# Patient Record
Sex: MALE | Race: WHITE | NOT HISPANIC OR LATINO | Employment: FULL TIME | URBAN - METROPOLITAN AREA
[De-identification: names, ages, dates, MRNs, and addresses within clinical notes are randomized per-mention and may not be internally consistent; named-entity substitution may affect disease eponyms.]

---

## 2018-07-30 ENCOUNTER — APPOINTMENT (OUTPATIENT)
Dept: RADIOLOGY | Facility: MEDICAL CENTER | Age: 48
End: 2018-07-30
Attending: EMERGENCY MEDICINE
Payer: COMMERCIAL

## 2018-07-30 ENCOUNTER — HOSPITAL ENCOUNTER (EMERGENCY)
Facility: MEDICAL CENTER | Age: 48
End: 2018-07-30
Attending: EMERGENCY MEDICINE
Payer: COMMERCIAL

## 2018-07-30 VITALS
HEART RATE: 85 BPM | BODY MASS INDEX: 36 KG/M2 | DIASTOLIC BLOOD PRESSURE: 90 MMHG | WEIGHT: 271.61 LBS | TEMPERATURE: 98.6 F | RESPIRATION RATE: 18 BRPM | OXYGEN SATURATION: 97 % | SYSTOLIC BLOOD PRESSURE: 133 MMHG | HEIGHT: 73 IN

## 2018-07-30 DIAGNOSIS — M71.50 TRAUMATIC BURSITIS: ICD-10-CM

## 2018-07-30 DIAGNOSIS — S61.431A: ICD-10-CM

## 2018-07-30 DIAGNOSIS — S60.221A CONTUSION OF RIGHT HAND, INITIAL ENCOUNTER: ICD-10-CM

## 2018-07-30 PROCEDURE — 302875 HCHG BANDAGE ACE 4 OR 6""

## 2018-07-30 PROCEDURE — A9270 NON-COVERED ITEM OR SERVICE: HCPCS | Performed by: EMERGENCY MEDICINE

## 2018-07-30 PROCEDURE — 73130 X-RAY EXAM OF HAND: CPT | Mod: RT

## 2018-07-30 PROCEDURE — 99284 EMERGENCY DEPT VISIT MOD MDM: CPT

## 2018-07-30 PROCEDURE — 700102 HCHG RX REV CODE 250 W/ 637 OVERRIDE(OP): Performed by: EMERGENCY MEDICINE

## 2018-07-30 PROCEDURE — 73080 X-RAY EXAM OF ELBOW: CPT | Mod: RT

## 2018-07-30 RX ORDER — HYDROCODONE BITARTRATE AND ACETAMINOPHEN 5; 325 MG/1; MG/1
1 TABLET ORAL ONCE
Status: COMPLETED | OUTPATIENT
Start: 2018-07-30 | End: 2018-07-30

## 2018-07-30 RX ORDER — NAPROXEN 500 MG/1
500 TABLET ORAL 2 TIMES DAILY WITH MEALS
Qty: 60 TAB | Refills: 0 | Status: SHIPPED | OUTPATIENT
Start: 2018-07-30 | End: 2019-05-28

## 2018-07-30 RX ORDER — HYDROCODONE BITARTRATE AND ACETAMINOPHEN 5; 325 MG/1; MG/1
1 TABLET ORAL EVERY 6 HOURS PRN
Qty: 14 TAB | Refills: 0 | Status: SHIPPED | OUTPATIENT
Start: 2018-07-30 | End: 2018-08-03

## 2018-07-30 RX ADMIN — HYDROCODONE BITARTRATE AND ACETAMINOPHEN 1 TABLET: 5; 325 TABLET ORAL at 22:26

## 2018-07-30 ASSESSMENT — PAIN SCALES - GENERAL: PAINLEVEL_OUTOF10: 8
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
## 2018-07-31 NOTE — DISCHARGE INSTRUCTIONS
Hand Injuries  Minor breaks (fractures), sprains, bruises (contusions), and burns of the hand are all examples of hand injuries. A fracture is a break in the bone. A sprain means that ligaments have been stretched or torn. A contusion is the result of an injury that caused bleeding under the skin. Burns are damage to the skin that occurs when the hand comes in contact with something very hot (or with certain chemicals).   HOME CARE INSTRUCTIONS  · For sprains, keep your hand raised (elevated) above the level of your heart. Do this until the pain and swelling improve.   · Use hand bandages (dressings) and splints to reduce motion, relieve pain, and prevent reinjury as directed. The dressing and splint should not be removed without your caregiver's approval.   · Put ice on the injured area to reduce pain and swelling due to fractures, sprains, and deep bruises.   · Put ice in a plastic bag.   · Place a towel between your skin and the bag.   · Leave the ice on for 15-20 minutes, 3-4 times a day. Do this for 2 3 days.   · Only take over-the-counter or prescription medicines as directed by your caregiver.   · Follow up with your caregiver or a specialist as directed.   Early motion exercises are sometimes needed to reduce joint stiffness after a hand injury. However, your hand should not be used for any activities that increase pain.  SEEK MEDICAL CARE IF:  · Your pain or swelling does not improve in 2 3 days.   SEEK IMMEDIATE MEDICAL CARE IF:  · You have increased pain, swelling, or redness in your hand.   · Your pain is not controlled with medicine.   · You have a fever or persistent symptoms for more than 2 3 days.   · You have a fever and your symptoms suddenly get worse.   · You have pain when moving your fingers.   · You have pus coming from the wound.   · You have numbness in your fingers.   MAKE SURE YOU:  · Understand these instructions.   · Will watch your condition.   · Will get help right away if you are not  doing well or get worse.   Document Released: 01/25/2006 Document Revised: 09/11/2013 Document Reviewed: 06/30/2013  "[employer]® Patient Information ©2013 "[employer].

## 2018-11-20 ENCOUNTER — OFFICE VISIT (OUTPATIENT)
Dept: MEDICAL GROUP | Facility: PHYSICIAN GROUP | Age: 48
End: 2018-11-20
Payer: COMMERCIAL

## 2018-11-20 VITALS
WEIGHT: 273 LBS | RESPIRATION RATE: 16 BRPM | BODY MASS INDEX: 36.18 KG/M2 | TEMPERATURE: 97.3 F | HEIGHT: 73 IN | SYSTOLIC BLOOD PRESSURE: 138 MMHG | DIASTOLIC BLOOD PRESSURE: 86 MMHG | OXYGEN SATURATION: 98 %

## 2018-11-20 DIAGNOSIS — E78.5 DYSLIPIDEMIA: ICD-10-CM

## 2018-11-20 DIAGNOSIS — Z82.49 FAMILY HISTORY OF HEART DISEASE: ICD-10-CM

## 2018-11-20 DIAGNOSIS — E66.9 OBESITY (BMI 30-39.9): ICD-10-CM

## 2018-11-20 DIAGNOSIS — Z28.21 INFLUENZA VACCINATION DECLINED: ICD-10-CM

## 2018-11-20 DIAGNOSIS — E34.9 HYPOTESTOSTERONISM: ICD-10-CM

## 2018-11-20 DIAGNOSIS — R53.83 FATIGUE, UNSPECIFIED TYPE: ICD-10-CM

## 2018-11-20 DIAGNOSIS — Z83.3 FAMILY HISTORY OF DIABETES MELLITUS: ICD-10-CM

## 2018-11-20 PROCEDURE — 99214 OFFICE O/P EST MOD 30 MIN: CPT | Performed by: FAMILY MEDICINE

## 2018-11-21 PROBLEM — Z28.21 INFLUENZA VACCINATION DECLINED: Status: ACTIVE | Noted: 2018-11-21

## 2018-11-21 NOTE — PROGRESS NOTES
"Subjective:   Miky Benavidez is a 48 y.o. male here today for evaluation and management of:     Family history of heart disease  Father diet of an MI, grandfather also passed early from an MI and has multiple relatives who  from heart attacks.   He would like to get testing done.   Will check lipids. And have pt back for risk calculation.   Plant based diet advised.   He does not smoke.     Hypotestosteronism  Patient has low testosterone and notes that a number of his friends who grew up with him in [place] where there was a nuclear problem in the 40s and 50s have low testosterone, thyroid issues and balding. Will check some screening labs.     Influenza vaccination declined  Patient states his immune system is strong from eating a lot of dirt in childhood, he declines the influenza vaccination.   Education provided.          Current medicines (including changes today)  Current Outpatient Prescriptions   Medication Sig Dispense Refill   • naproxen (NAPROSYN) 500 MG Tab Take 1 Tab by mouth 2 times a day, with meals. (Patient not taking: Reported on 2018) 60 Tab 0     No current facility-administered medications for this visit.      He  has a past medical history of Hepatic steatosis.    ROS  No chest pain, no shortness of breath, no abdominal pain       Objective:     Blood pressure 138/86, temperature 36.3 °C (97.3 °F), temperature source Temporal, resp. rate 16, height 1.854 m (6' 1\"), weight 123.8 kg (273 lb), SpO2 98 %. Body mass index is 36.02 kg/m².   Physical Exam:  Constitutional: Alert, no distress.  Skin: Warm, dry, good turgor, no rashes in visible areas.  Eye: Equal, round and reactive, conjunctiva clear, lids normal.  ENMT: Lips without lesions, good dentition, oropharynx clear.  Neck: Trachea midline, no masses, no thyromegaly. No cervical or supraclavicular lymphadenopathy  Respiratory: Unlabored respiratory effort, lungs clear to auscultation, no wheezes, no ronchi.  Cardiovascular: " Normal S1, S2, no murmur, no edema.  Abdomen: Soft, non-tender, no masses, no hepatosplenomegaly.  Psych: Alert and oriented x3, normal affect and mood.        Assessment and Plan:   The following treatment plan was discussed    1. Family history of heart disease  - Cardiac Stress Test Treadmill Only; Future    2. Hypotestosteronism  - TESTOSTERONE SERUM; Future    3. Obesity (BMI 30-39.9)  - Patient identified as having weight management issue.  Appropriate orders and counseling given.  - COMP METABOLIC PANEL; Future  - HEMOGLOBIN A1C; Future    4. Dyslipidemia  - Lipid Profile; Future    5. Fatigue, unspecified type  - COMP METABOLIC PANEL; Future  - TSH WITH REFLEX TO FT4; Future  - TESTOSTERONE SERUM; Future    6. Family history of diabetes mellitus  - HEMOGLOBIN A1C; Future      Followup: No Follow-up on file.

## 2018-11-21 NOTE — ASSESSMENT & PLAN NOTE
Patient has low testosterone and notes that a number of his friends who grew up with him in [place] where there was a nuclear problem in the 40s and 50s have low testosterone, thyroid issues and balding. Will check some screening labs.

## 2018-11-21 NOTE — ASSESSMENT & PLAN NOTE
Patient states his immune system is strong from eating a lot of dirt in childhood, he declines the influenza vaccination.   Education provided.

## 2018-11-21 NOTE — ASSESSMENT & PLAN NOTE
Father diet of an MI, grandfather also passed early from an MI and has multiple relatives who  from heart attacks.   He would like to get testing done.   Will check lipids. And have pt back for risk calculation.   Plant based diet advised.   He does not smoke.

## 2019-01-29 ENCOUNTER — NON-PROVIDER VISIT (OUTPATIENT)
Dept: CARDIOLOGY | Facility: MEDICAL CENTER | Age: 49
End: 2019-01-29
Payer: COMMERCIAL

## 2019-01-29 DIAGNOSIS — Z82.49 FAMILY HISTORY OF HEART DISEASE: ICD-10-CM

## 2019-01-29 LAB — TREADMILL STRESS: NORMAL

## 2019-01-29 PROCEDURE — 93015 CV STRESS TEST SUPVJ I&R: CPT | Performed by: INTERNAL MEDICINE

## 2019-01-31 VITALS
OXYGEN SATURATION: 96 % | BODY MASS INDEX: 36.18 KG/M2 | WEIGHT: 273 LBS | HEIGHT: 73 IN | HEART RATE: 82 BPM | DIASTOLIC BLOOD PRESSURE: 96 MMHG | SYSTOLIC BLOOD PRESSURE: 133 MMHG

## 2019-03-27 ENCOUNTER — HOSPITAL ENCOUNTER (OUTPATIENT)
Dept: LAB | Facility: MEDICAL CENTER | Age: 49
End: 2019-03-27
Attending: FAMILY MEDICINE
Payer: COMMERCIAL

## 2019-03-27 DIAGNOSIS — E34.9 HYPOTESTOSTERONISM: ICD-10-CM

## 2019-03-27 DIAGNOSIS — Z83.3 FAMILY HISTORY OF DIABETES MELLITUS: ICD-10-CM

## 2019-03-27 DIAGNOSIS — E78.5 DYSLIPIDEMIA: ICD-10-CM

## 2019-03-27 DIAGNOSIS — E66.9 OBESITY (BMI 30-39.9): ICD-10-CM

## 2019-03-27 DIAGNOSIS — R53.83 FATIGUE, UNSPECIFIED TYPE: ICD-10-CM

## 2019-03-27 LAB
ALBUMIN SERPL BCP-MCNC: 4.2 G/DL (ref 3.2–4.9)
ALBUMIN/GLOB SERPL: 1.6 G/DL
ALP SERPL-CCNC: 86 U/L (ref 30–99)
ALT SERPL-CCNC: 18 U/L (ref 2–50)
ANION GAP SERPL CALC-SCNC: 7 MMOL/L (ref 0–11.9)
AST SERPL-CCNC: 16 U/L (ref 12–45)
BILIRUB SERPL-MCNC: 0.6 MG/DL (ref 0.1–1.5)
BUN SERPL-MCNC: 15 MG/DL (ref 8–22)
CALCIUM SERPL-MCNC: 8.9 MG/DL (ref 8.5–10.5)
CHLORIDE SERPL-SCNC: 106 MMOL/L (ref 96–112)
CHOLEST SERPL-MCNC: 196 MG/DL (ref 100–199)
CO2 SERPL-SCNC: 27 MMOL/L (ref 20–33)
CREAT SERPL-MCNC: 1.09 MG/DL (ref 0.5–1.4)
FASTING STATUS PATIENT QL REPORTED: NORMAL
GLOBULIN SER CALC-MCNC: 2.6 G/DL (ref 1.9–3.5)
GLUCOSE SERPL-MCNC: 87 MG/DL (ref 65–99)
HDLC SERPL-MCNC: 40 MG/DL
LDLC SERPL CALC-MCNC: 133 MG/DL
POTASSIUM SERPL-SCNC: 4.2 MMOL/L (ref 3.6–5.5)
PROT SERPL-MCNC: 6.8 G/DL (ref 6–8.2)
SODIUM SERPL-SCNC: 140 MMOL/L (ref 135–145)
TESTOST SERPL-MCNC: 123 NG/DL (ref 175–781)
TRIGL SERPL-MCNC: 116 MG/DL (ref 0–149)
TSH SERPL DL<=0.005 MIU/L-ACNC: 1.41 UIU/ML (ref 0.38–5.33)

## 2019-03-27 PROCEDURE — 83036 HEMOGLOBIN GLYCOSYLATED A1C: CPT

## 2019-03-27 PROCEDURE — 84403 ASSAY OF TOTAL TESTOSTERONE: CPT

## 2019-03-27 PROCEDURE — 80053 COMPREHEN METABOLIC PANEL: CPT

## 2019-03-27 PROCEDURE — 36415 COLL VENOUS BLD VENIPUNCTURE: CPT

## 2019-03-27 PROCEDURE — 84443 ASSAY THYROID STIM HORMONE: CPT

## 2019-03-27 PROCEDURE — 80061 LIPID PANEL: CPT

## 2019-03-29 LAB
EST. AVERAGE GLUCOSE BLD GHB EST-MCNC: 117 MG/DL
HBA1C MFR BLD: 5.7 % (ref 0–5.6)

## 2019-05-28 ENCOUNTER — OFFICE VISIT (OUTPATIENT)
Dept: MEDICAL GROUP | Facility: PHYSICIAN GROUP | Age: 49
End: 2019-05-28
Payer: COMMERCIAL

## 2019-05-28 VITALS
DIASTOLIC BLOOD PRESSURE: 86 MMHG | WEIGHT: 279 LBS | HEIGHT: 73 IN | TEMPERATURE: 98.4 F | OXYGEN SATURATION: 97 % | RESPIRATION RATE: 16 BRPM | BODY MASS INDEX: 36.98 KG/M2 | HEART RATE: 79 BPM | SYSTOLIC BLOOD PRESSURE: 116 MMHG

## 2019-05-28 DIAGNOSIS — R73.09 ELEVATED HEMOGLOBIN A1C: ICD-10-CM

## 2019-05-28 DIAGNOSIS — G83.4 CAUDA EQUINA COMPRESSION (HCC): ICD-10-CM

## 2019-05-28 DIAGNOSIS — E66.9 OBESITY (BMI 30-39.9): ICD-10-CM

## 2019-05-28 DIAGNOSIS — E34.9 HYPOTESTOSTERONISM: ICD-10-CM

## 2019-05-28 DIAGNOSIS — Z23 NEED FOR VACCINATION: ICD-10-CM

## 2019-05-28 DIAGNOSIS — N28.9 RENAL INSUFFICIENCY: ICD-10-CM

## 2019-05-28 DIAGNOSIS — E78.2 MIXED HYPERLIPIDEMIA: ICD-10-CM

## 2019-05-28 PROCEDURE — 90715 TDAP VACCINE 7 YRS/> IM: CPT | Performed by: FAMILY MEDICINE

## 2019-05-28 PROCEDURE — 90471 IMMUNIZATION ADMIN: CPT | Performed by: FAMILY MEDICINE

## 2019-05-28 PROCEDURE — 99214 OFFICE O/P EST MOD 30 MIN: CPT | Mod: 25 | Performed by: FAMILY MEDICINE

## 2019-05-28 ASSESSMENT — PATIENT HEALTH QUESTIONNAIRE - PHQ9
5. POOR APPETITE OR OVEREATING: 3 - NEARLY EVERY DAY
SUM OF ALL RESPONSES TO PHQ QUESTIONS 1-9: 8
CLINICAL INTERPRETATION OF PHQ2 SCORE: 2

## 2019-05-28 NOTE — ASSESSMENT & PLAN NOTE
Patient has slight increase in weight from last visit current weight is 279 pounds patient is advised on gradual weight loss with diet changes and regular activity.  Advised to avoid sweetened drinks like sodas and juices.

## 2019-05-28 NOTE — PROGRESS NOTES
"Subjective:   Miky Byrd is a 48 y.o. male here today for evaluation and management of:     Hypotestosteronism  Testosterone level low to 123 referral to endocrinology done.  Patient does report problems with erectile dysfunction as well with muscle fatigue.    Obesity (BMI 30-39.9)  Patient has slight increase in weight from last visit current weight is 279 pounds patient is advised on gradual weight loss with diet changes and regular activity.  Advised to avoid sweetened drinks like sodas and juices.    Renal insufficiency  Recent labs show normal GFR normal creatinine.  Will resolve this problem today.  Continue to monitor CMP on an annual basis.    Mixed hyperlipidemia  Results for MIKY BYRD (MRN [id number]) as of 5/28/2019 16:41   Ref. Range 3/27/2019 08:24   Cholesterol,Tot Latest Ref Range: 100 - 199 mg/dL 196   Triglycerides Latest Ref Range: 0 - 149 mg/dL 116   HDL Latest Ref Range: >=40 mg/dL 40   LDL Latest Ref Range: <100 mg/dL 133 (H)   Labs show mild elevation of LDL to 133 HDL is protective at 40 patient is advised to make lifestyle modification first we can recheck next year and if LDL is trending up can calculate his ASCVD risk score before starting statin.    Cauda equina compression  No current symptoms or neurological deficits.       Patient had a normal treadmill stress test done recently.  Current medicines (including changes today)  No current outpatient prescriptions on file.     No current facility-administered medications for this visit.      He  has a past medical history of Hepatic steatosis.    ROS  No chest pain, no shortness of breath, no abdominal pain       Objective:     /86 (BP Location: Left arm, Patient Position: Sitting, BP Cuff Size: Large adult)   Pulse 79   Temp 36.9 °C (98.4 °F) (Temporal)   Resp 16   Ht 1.854 m (6' 1\")   Wt (!) 126.6 kg (279 lb)   SpO2 97%  Body mass index is 36.81 kg/m².   Physical Exam:  Constitutional: Alert, no distress.  Skin: Warm, dry, " good turgor, no rashes in visible areas.  Eye: Equal, round and reactive, conjunctiva clear, lids normal.  ENMT: Lips without lesions, good dentition, oropharynx clear.  Neck: Trachea midline, no masses, no thyromegaly. No cervical or supraclavicular lymphadenopathy  Respiratory: Unlabored respiratory effort, lungs clear to auscultation, no wheezes, no ronchi.  Cardiovascular: Normal S1, S2, no murmur, no edema.  Abdomen: Soft, non-tender, no masses, no hepatosplenomegaly.  Psych: Alert and oriented x3, normal affect and mood.        Assessment and Plan:   The following treatment plan was discussed    1. Need for vaccination  - TDAP VACCINE =>6YO IM    2. Hypotestosteronism  - TESTOSTERONE SERUM; Future  - REFERRAL TO ENDOCRINOLOGY    3. Obesity (BMI 30-39.9)  Advised on gradual weight loss.  Patient says that he can discontinue soft drinks or sodas.    4. Renal insufficiency  Normal on recent labs continue adequate hydration and continue to monitor  - Comp Metabolic Panel; Future    5. Mixed hyperlipidemia  Advised on diet and lifestyle changes.  - Lipid Profile; Future    6. Elevated hemoglobin A1c  Advised on diet and lifestyle changes.  - HEMOGLOBIN A1C; Future    7. Cauda equina compression (HCC)  No acute changes.  Stable.      Followup: Return in about 1 year (around 5/28/2020) for test level, A1c, lipids.

## 2019-05-28 NOTE — ASSESSMENT & PLAN NOTE
Results for YESSENIA BYRD (MRN [id number]) as of 5/28/2019 16:41   Ref. Range 3/27/2019 08:24   Cholesterol,Tot Latest Ref Range: 100 - 199 mg/dL 196   Triglycerides Latest Ref Range: 0 - 149 mg/dL 116   HDL Latest Ref Range: >=40 mg/dL 40   LDL Latest Ref Range: <100 mg/dL 133 (H)   Labs show mild elevation of LDL to 133 HDL is protective at 40 patient is advised to make lifestyle modification first we can recheck next year and if LDL is trending up can calculate his ASCVD risk score before starting statin.

## 2019-05-28 NOTE — ASSESSMENT & PLAN NOTE
Recent labs show normal GFR normal creatinine.  Will resolve this problem today.  Continue to monitor CMP on an annual basis.

## 2019-05-28 NOTE — ASSESSMENT & PLAN NOTE
Testosterone level low to 123 referral to endocrinology done.  Patient does report problems with erectile dysfunction as well with muscle fatigue.

## 2020-02-08 ENCOUNTER — HOSPITAL ENCOUNTER (OUTPATIENT)
Facility: MEDICAL CENTER | Age: 50
End: 2020-02-08
Attending: NURSE PRACTITIONER
Payer: COMMERCIAL

## 2020-02-08 ENCOUNTER — OFFICE VISIT (OUTPATIENT)
Dept: URGENT CARE | Facility: PHYSICIAN GROUP | Age: 50
End: 2020-02-08
Payer: COMMERCIAL

## 2020-02-08 VITALS
BODY MASS INDEX: 38.17 KG/M2 | SYSTOLIC BLOOD PRESSURE: 132 MMHG | DIASTOLIC BLOOD PRESSURE: 84 MMHG | WEIGHT: 288 LBS | HEIGHT: 73 IN | HEART RATE: 77 BPM | RESPIRATION RATE: 16 BRPM | TEMPERATURE: 97.7 F | OXYGEN SATURATION: 98 %

## 2020-02-08 DIAGNOSIS — J02.9 SORE THROAT: ICD-10-CM

## 2020-02-08 DIAGNOSIS — J03.90 EXUDATIVE TONSILLITIS: ICD-10-CM

## 2020-02-08 LAB
INT CON NEG: NORMAL
INT CON POS: NORMAL
S PYO AG THROAT QL: NEGATIVE

## 2020-02-08 PROCEDURE — 87880 STREP A ASSAY W/OPTIC: CPT | Performed by: NURSE PRACTITIONER

## 2020-02-08 PROCEDURE — 87070 CULTURE OTHR SPECIMN AEROBIC: CPT

## 2020-02-08 PROCEDURE — 99204 OFFICE O/P NEW MOD 45 MIN: CPT | Performed by: NURSE PRACTITIONER

## 2020-02-08 RX ORDER — AMOXICILLIN 500 MG/1
500 CAPSULE ORAL 2 TIMES DAILY
Qty: 20 CAP | Refills: 0 | Status: SHIPPED | OUTPATIENT
Start: 2020-02-08 | End: 2020-02-18

## 2020-02-08 RX ORDER — LIDOCAINE HYDROCHLORIDE 20 MG/ML
5 SOLUTION OROPHARYNGEAL 4 TIMES DAILY PRN
Qty: 120 ML | Refills: 0 | Status: SHIPPED | OUTPATIENT
Start: 2020-02-08 | End: 2020-04-07

## 2020-02-08 NOTE — PROGRESS NOTES
Chief Complaint   Patient presents with   • Otalgia     sore throat x1 week        HISTORY OF PRESENT ILLNESS: Patient is a 49 y.o. male who presents today due to complaints of a sore throat for the past week, worsening. Reports associated ear pain, pain with swallowing. Denies associated congestion, cough, or difficulty breathing. He has tried OTC medications at home without much improvement. Denies known ill contacts.     Patient Active Problem List    Diagnosis Date Noted   • Cauda equina compression (HCC) 08/04/2013     Priority: High   • Vitamin d deficiency 08/05/2013     Priority: Medium   • Mixed hyperlipidemia 05/28/2019   • Influenza vaccination declined 11/21/2018   • Obesity (BMI 30-39.9) 11/20/2018   • Calculus of bile duct without mention of cholecystitis or obstruction 11/17/2016   • History of biliary stent insertion 10/24/2016   • Common wart 07/17/2015   • Hypotestosteronism 07/14/2015   • Family history of heart disease 07/09/2015   • Erectile dysfunction 07/08/2015   • SOB (shortness of breath) 07/08/2015   • Intervertebral disc rupture 08/04/2013       Allergies:Patient has no known allergies.    Current Outpatient Medications Ordered in Epic   Medication Sig Dispense Refill   • amoxicillin (AMOXIL) 500 MG Cap Take 1 Cap by mouth 2 times a day for 10 days. 20 Cap 0   • lidocaine (XYLOCAINE) 2 % Solution Take 5 mL by mouth 4 times a day as needed for Throat/Mouth Pain. Mix 5mL in 1/4 cup of water, swish medication and spit. 120 mL 0     No current Epic-ordered facility-administered medications on file.        Past Medical History:   Diagnosis Date   • Hepatic steatosis        Social History     Tobacco Use   • Smoking status: Never Smoker   • Smokeless tobacco: Never Used   Substance Use Topics   • Alcohol use: Yes     Comment: occ   • Drug use: No       Family Status   Relation Name Status   • Mo  Alive   • Fa  Alive     Family History   Problem Relation Age of Onset   • Diabetes Mother    •  "Diabetes Father    • Heart Disease Father        ROS:  Review of Systems   Constitutional: Negative for fever, chills. Negative for weight loss, malaise, and fatigue.   HENT: Positive for sore throat, ear pain. Negative for ear pain, nosebleeds, congestion.   Eyes: Negative for vision changes.   Cardiovascular: Negative for chest pain, palpitations, orthopnea and leg swelling.   Respiratory: Negative for cough, sputum production, shortness of breath and wheezing.   Gastrointestinal: Negative for abdominal pain, nausea, vomiting or diarrhea.   : Negative for changes in urination.   Skin: Negative for rash, diaphoresis.     Exam:  /84   Pulse 77   Temp 36.5 °C (97.7 °F)   Resp 16   Ht 1.854 m (6' 1\")   Wt (!) 130.6 kg (288 lb)   SpO2 98%   General: well-nourished, well-developed male in NAD  Head: normocephalic, atraumatic  Eyes: PERRLA, no conjunctival injection, acuity grossly intact, lids normal.  Ears: normal shape and symmetry, no tenderness, no discharge. External canals are without any significant edema or erythema. Tympanic membranes are without any inflammation, no effusion. Gross auditory acuity is intact.  Nose: symmetrical without tenderness, no discharge.  Mouth/Throat: reasonable hygiene. There is erythema, right sided exudates and tonsillar enlargement present.  Neck: no masses, range of motion within normal limits, no tracheal deviation. No obvious thyroid enlargement.   Lymph: bilateral anterior cervical adenopathy. No supraclavicular adenopathy.   Neuro: alert and oriented. Cranial nerves 1-12 grossly intact. No sensory deficit.   Cardiovascular: regular rate and rhythm. No edema.  Pulmonary: no distress. Chest is symmetrical with respiration, no wheezes, crackles, or rhonchi.   Musculoskeletal: no clubbing, appropriate muscle tone, gait is stable.  Skin: warm, dry, intact, no clubbing, no cyanosis, no rashes.   Psych: appropriate mood, affect, judgement.         Assessment/Plan:  1. " Exudative tonsillitis  amoxicillin (AMOXIL) 500 MG Cap    CULTURE THROAT   2. Sore throat  POCT Rapid Strep A    amoxicillin (AMOXIL) 500 MG Cap    lidocaine (XYLOCAINE) 2 % Solution    CULTURE THROAT         POC strep negative      Suspect bacterial etiology, culture obtained.  Ear pain most likely referred.  OTC Motrin and Tylenol for pain relief.  Amoxicillin as directed.  Lidocaine as needed.  Antibiotic as directed. Hand hygiene. Increase fluid intake, rest. Warm salt water gargles.   Supportive care, differential diagnoses, and indications for immediate follow-up discussed with patient.   Pathogenesis of diagnosis discussed including typical length and natural progression.   Instructed to return to clinic or nearest emergency department for any change in condition, further concerns, or worsening of symptoms.  Patient states understanding of the plan of care and discharge instructions.  Instructed to make an appointment, for follow up, with his primary care provider.        Please note that this dictation was created using voice recognition software. I have made every reasonable attempt to correct obvious errors, but I expect that there are errors of grammar and possibly content that I did not discover before finalizing the note.      CANDELARIO Niño.

## 2020-02-10 LAB
BACTERIA SPEC RESP CULT: NORMAL
SIGNIFICANT IND 70042: NORMAL
SITE SITE: NORMAL
SOURCE SOURCE: NORMAL

## 2020-04-06 NOTE — PROGRESS NOTES
New Patient Consult Note  Primary care physician: Rosa Riddle M.D.    Reason for consult: Hypogonadism    HPI:  This encounter was conducted via Zoom.  Verbal consent was obtained. Patient's identity was verified.  Miky Benavidez is a 49 y.o. old patient who comes in today for evaluation of Hypogonadism, Obesity, and Vitamin D Deficiency.  He states he is more tired, having more muscle weakness, and erectile dysfunction.  He is a  and trying to eat healthier and walking more.  He is not taking Vitamin D supplementation except a multivitamin.      ROS:  Constitutional: fatigue,No weight loss  Muscular: muscle weakness, loss of endurance  : low libido, ED  Cardiac: No palpitations or racing heart  Resp: No shortness of breath  Neuro: No numbness or tinging in feet  Endo: No heat or cold intolerance, no polyuria or polydipsia  All other systems were reviewed and were negative.    Past Medical History:  Patient Active Problem List    Diagnosis Date Noted   • Cauda equina compression (HCC) 08/04/2013     Priority: High   • Vitamin d deficiency 08/05/2013     Priority: Medium   • Mixed hyperlipidemia 05/28/2019   • Influenza vaccination declined 11/21/2018   • Obesity (BMI 30-39.9) 11/20/2018   • Calculus of bile duct without mention of cholecystitis or obstruction 11/17/2016   • History of biliary stent insertion 10/24/2016   • Common wart 07/17/2015   • Hypotestosteronism 07/14/2015   • Family history of heart disease 07/09/2015   • Erectile dysfunction 07/08/2015   • SOB (shortness of breath) 07/08/2015   • Intervertebral disc rupture 08/04/2013       Past Surgical History:  Past Surgical History:   Procedure Laterality Date   • ERCP W/ INSERTION STENT/TUBE N/A 11/17/2016    Procedure: ERCP W/ INSERTION STENT/TUBE - FOR STENT PLACEMENT/REMOVAL;  Surgeon: Andriy Somers M.D.;  Location: SURGERY [facility];  Service:    • ERCP W/REMOVAL CALCULUS N/A 11/17/2016    Procedure: ERCP W/REMOVAL  CALCULUS;  Surgeon: Andriy Somers M.D.;  Location: SURGERY [facility];  Service:    • ERCP IN OR  10/7/2016    Procedure: Endoscopic retrogade cholangiopancreatography;  Surgeon: Andriy Somers M.D.;  Location: SURGERY [facility];  Service:    • SANDI BY LAPAROSCOPY  10/6/2016    Procedure: SANDI BY LAPAROSCOPY - WITH INTRAOPERATIVE CHOLANGIOGRAMS;  Surgeon: Keaton Parisi M.D.;  Location: SURGERY [facility];  Service:    • LUMBAR LAMINECTOMY DISKECTOMY  8/5/2013    Performed by Escobar Capps M.D. at SURGERY [facility]   • TONSILLECTOMY         Allergies:  Patient has no known allergies.    Social History:  Social History     Socioeconomic History   • Marital status:      Spouse name: Not on file   • Number of children: Not on file   • Years of education: Not on file   • Highest education level: Not on file   Occupational History   • Not on file   Social Needs   • Financial resource strain: Not on file   • Food insecurity     Worry: Not on file     Inability: Not on file   • Transportation needs     Medical: Not on file     Non-medical: Not on file   Tobacco Use   • Smoking status: Never Smoker   • Smokeless tobacco: Never Used   Substance and Sexual Activity   • Alcohol use: Yes     Frequency: Monthly or less     Comment: occ   • Drug use: No   • Sexual activity: Yes     Partners: Female   Lifestyle   • Physical activity     Days per week: Not on file     Minutes per session: Not on file   • Stress: Not on file   Relationships   • Social connections     Talks on phone: Not on file     Gets together: Not on file     Attends [employer]ist service: Not on file     Active member of club or organization: Not on file     Attends meetings of clubs or organizations: Not on file     Relationship status: Not on file   • Intimate partner violence     Fear of current or ex partner: Not on file     Emotionally abused: Not on file     Physically abused: Not on file     Forced sexual activity: Not  on file   Other Topics Concern   • Not on file   Social History Narrative   • Not on file       Family History:  Family History   Problem Relation Age of Onset   • Diabetes Mother    • Diabetes Father    • Heart Disease Father        Medications:    Current Outpatient Medications:   •  Multiple Vitamins-Minerals (MENS MULTIVITAMIN PO), Take  by mouth., Disp: , Rfl:   •  vitamin D, Ergocalciferol, (DRISDOL) 1.25 MG (04297 UT) Cap capsule, Take 1 Cap by mouth every 7 days. With food., Disp: 12 Cap, Rfl: 3    Current Facility-Administered Medications:   •  testosterone cypionate (DEPO-TESTOSTERONE) injection 250 mg, 250 mg, Intramuscular, Q14 DAYS, Sedrick Young M.D.    Labs: Reviewed    Physical Examination:  Vital signs: There were no vitals taken for this visit. There is no height or weight on file to calculate BMI. Patient's body mass index is unknown because there is no height or weight on file.   General: No apparent distress, cooperative  Eyes: No scleral icterus or discharge  ENMT: Normal on external inspection of nose, lips, normal thyroid on inspection  Neck: No abnormal masses on inspection  Resp: Normal effort  Extremities: No visible edema  Abdomen:  visible abdominal obesity  Neuro: Alert and oriented  Skin: No visible rash  Psych: Normal mood and affect, intact memory and able to make informed decisions    Assessment and Plan:    1. Hypogonadism in male  Start testosterone cypionate 250 mg IM every 2 weeks; side effects and  Benefits discussed in detail.     2. Vitamin D deficiency  Recommend vit D 50,000 units once a week with food. Script sent to pharmacy.   Side effects and benefits discussed with patient in detail.    Return in about 3 months (around 7/7/2020).    Thank you for allowing me to participate in the care of this patient.        CC:   Rosa Riddle M.D.    This note was created using voice recognition software (Dragon). The accuracy of the dictation is limited by the abilities of  the software. I have reviewed the note prior to signing, however some errors in grammar and context are still possible. If you have any questions related to this note please do not hesitate to contact our office.

## 2020-04-07 ENCOUNTER — OFFICE VISIT (OUTPATIENT)
Dept: ENDOCRINOLOGY | Facility: MEDICAL CENTER | Age: 50
End: 2020-04-07
Payer: COMMERCIAL

## 2020-04-07 DIAGNOSIS — E66.9 OBESITY (BMI 35.0-39.9 WITHOUT COMORBIDITY): ICD-10-CM

## 2020-04-07 DIAGNOSIS — E03.9 HYPOTHYROIDISM, UNSPECIFIED TYPE: ICD-10-CM

## 2020-04-07 DIAGNOSIS — E55.9 VITAMIN D DEFICIENCY: ICD-10-CM

## 2020-04-07 DIAGNOSIS — E29.1 HYPOGONADISM IN MALE: ICD-10-CM

## 2020-04-07 DIAGNOSIS — R79.89 PATHOLOGIC HIGH SERUM PROLACTIN: ICD-10-CM

## 2020-04-07 DIAGNOSIS — E53.8 VITAMIN B 12 DEFICIENCY: ICD-10-CM

## 2020-04-07 PROCEDURE — 99204 OFFICE O/P NEW MOD 45 MIN: CPT | Mod: 95,CR | Performed by: INTERNAL MEDICINE

## 2020-04-07 RX ORDER — ERGOCALCIFEROL 1.25 MG/1
50000 CAPSULE ORAL
Qty: 12 CAP | Refills: 3 | Status: SHIPPED | OUTPATIENT
Start: 2020-04-07 | End: 2020-11-06

## 2020-04-07 RX ORDER — TESTOSTERONE CYPIONATE 200 MG/ML
250 INJECTION, SOLUTION INTRAMUSCULAR
Status: DISCONTINUED | OUTPATIENT
Start: 2020-04-07 | End: 2021-04-12

## 2020-04-07 SDOH — HEALTH STABILITY: MENTAL HEALTH: HOW OFTEN DO YOU HAVE A DRINK CONTAINING ALCOHOL?: MONTHLY OR LESS

## 2020-05-01 ENCOUNTER — NON-PROVIDER VISIT (OUTPATIENT)
Dept: ENDOCRINOLOGY | Facility: MEDICAL CENTER | Age: 50
End: 2020-05-01
Payer: COMMERCIAL

## 2020-05-01 PROCEDURE — 96372 THER/PROPH/DIAG INJ SC/IM: CPT | Performed by: INTERNAL MEDICINE

## 2020-05-01 RX ADMIN — TESTOSTERONE CYPIONATE 250 MG: 200 INJECTION, SOLUTION INTRAMUSCULAR at 09:56

## 2020-05-01 NOTE — PROGRESS NOTES
Miky's wife came in today to learn how to give the Testosterone injection.  She was able to draw up the testosterone and give it without difficulty.

## 2020-05-06 DIAGNOSIS — E29.1 HYPOGONADISM IN MALE: ICD-10-CM

## 2020-05-06 RX ORDER — TESTOSTERONE CYPIONATE 200 MG/ML
250 INJECTION, SOLUTION INTRAMUSCULAR
Qty: 10 ML | Refills: 0 | Status: SHIPPED | OUTPATIENT
Start: 2020-05-06 | End: 2020-09-14

## 2020-05-06 RX ORDER — SYRINGE W-NEEDLE,DISPOSAB,3 ML 25GX5/8"
SYRINGE, EMPTY DISPOSABLE MISCELLANEOUS
Qty: 12 EACH | Refills: 3 | Status: SHIPPED | OUTPATIENT
Start: 2020-05-06 | End: 2021-10-22

## 2020-05-08 ENCOUNTER — TELEPHONE (OUTPATIENT)
Dept: ENDOCRINOLOGY | Facility: MEDICAL CENTER | Age: 50
End: 2020-05-08

## 2020-05-08 NOTE — TELEPHONE ENCOUNTER
DOCUMENTATION OF PAR STATUS:    1. Name of Medication & Dose: testosterone 200 mg      2. Name of Prescription Coverage Company & phone #: cover my meds    3. Date Prior Auth Submitted: 05/08/2020    4. What information was given to obtain insurance decision? Last office notes    5. Prior Auth Status? Pending    6. Patient Notified: yes

## 2020-09-14 DIAGNOSIS — E29.1 HYPOGONADISM IN MALE: ICD-10-CM

## 2020-09-14 RX ORDER — TESTOSTERONE CYPIONATE 200 MG/ML
INJECTION, SOLUTION INTRAMUSCULAR
Qty: 10 ML | Refills: 0 | Status: SHIPPED | OUTPATIENT
Start: 2020-09-14 | End: 2020-09-15

## 2020-09-14 NOTE — TELEPHONE ENCOUNTER
Received request via: Pharmacy    Was the patient seen in the last year in this department? Yes    Does the patient have an active prescription (recently filled or refills available) for medication(s) requested? No       testosterone cypionate (DEPO-TESTOSTERONE) 200 MG/ML Solution injection    Sig: INJECT 1.25 ML INTRAMUSCULARLY EVERY 14 DAYS  DAYS

## 2020-09-15 DIAGNOSIS — E29.1 HYPOGONADISM IN MALE: ICD-10-CM

## 2020-09-15 RX ORDER — TESTOSTERONE CYPIONATE 200 MG/ML
INJECTION, SOLUTION INTRAMUSCULAR
Qty: 10 ML | Refills: 0 | Status: SHIPPED | OUTPATIENT
Start: 2020-09-15 | End: 2021-04-12 | Stop reason: SDUPTHER

## 2020-10-24 ENCOUNTER — TELEPHONE (OUTPATIENT)
Dept: LAB | Facility: MEDICAL CENTER | Age: 50
End: 2020-10-24

## 2020-10-24 NOTE — TELEPHONE ENCOUNTER
1. Caller Name: Miky Benavidez                        Call Back Number: [phone]      How would the patient prefer to be contacted with a response: Phone call OK to leave a detailed message    Pt requesting labs to be ordered before annual exam, pt states they were previously ordered over a year ago and might have , pt us unable to get them done before they  due to rescheduling, pt would like a call back once ordered.

## 2020-10-28 DIAGNOSIS — R73.09 ELEVATED HEMOGLOBIN A1C: ICD-10-CM

## 2020-10-28 DIAGNOSIS — E78.2 MIXED HYPERLIPIDEMIA: ICD-10-CM

## 2020-10-28 NOTE — TELEPHONE ENCOUNTER
Pt notified    Pls let pt know: Basic fasting labs ordered. I see his endocrinologist also has a large extensive panel of labs ordered. I did not duplicate these.   Rosa Riddle M.D.

## 2020-10-31 ENCOUNTER — HOSPITAL ENCOUNTER (OUTPATIENT)
Dept: LAB | Facility: MEDICAL CENTER | Age: 50
End: 2020-10-31
Attending: FAMILY MEDICINE
Payer: COMMERCIAL

## 2020-10-31 ENCOUNTER — HOSPITAL ENCOUNTER (OUTPATIENT)
Dept: LAB | Facility: MEDICAL CENTER | Age: 50
End: 2020-10-31
Attending: INTERNAL MEDICINE
Payer: COMMERCIAL

## 2020-10-31 DIAGNOSIS — R79.89 PATHOLOGIC HIGH SERUM PROLACTIN: ICD-10-CM

## 2020-10-31 DIAGNOSIS — E53.8 VITAMIN B 12 DEFICIENCY: ICD-10-CM

## 2020-10-31 DIAGNOSIS — E55.9 VITAMIN D DEFICIENCY: ICD-10-CM

## 2020-10-31 DIAGNOSIS — E03.9 HYPOTHYROIDISM, UNSPECIFIED TYPE: ICD-10-CM

## 2020-10-31 DIAGNOSIS — R73.09 ELEVATED HEMOGLOBIN A1C: ICD-10-CM

## 2020-10-31 DIAGNOSIS — E29.1 HYPOGONADISM IN MALE: ICD-10-CM

## 2020-10-31 DIAGNOSIS — E78.2 MIXED HYPERLIPIDEMIA: ICD-10-CM

## 2020-10-31 LAB
ALBUMIN SERPL BCP-MCNC: 4 G/DL (ref 3.2–4.9)
ALBUMIN/GLOB SERPL: 1.6 G/DL
ALP SERPL-CCNC: 72 U/L (ref 30–99)
ALT SERPL-CCNC: 16 U/L (ref 2–50)
ANION GAP SERPL CALC-SCNC: 9 MMOL/L (ref 7–16)
AST SERPL-CCNC: 15 U/L (ref 12–45)
BILIRUB SERPL-MCNC: 0.6 MG/DL (ref 0.1–1.5)
BUN SERPL-MCNC: 11 MG/DL (ref 8–22)
CALCIUM SERPL-MCNC: 8.7 MG/DL (ref 8.5–10.5)
CHLORIDE SERPL-SCNC: 105 MMOL/L (ref 96–112)
CHOLEST SERPL-MCNC: 162 MG/DL (ref 100–199)
CO2 SERPL-SCNC: 27 MMOL/L (ref 20–33)
CREAT SERPL-MCNC: 1.29 MG/DL (ref 0.5–1.4)
EST. AVERAGE GLUCOSE BLD GHB EST-MCNC: 105 MG/DL
FASTING STATUS PATIENT QL REPORTED: NORMAL
GLOBULIN SER CALC-MCNC: 2.5 G/DL (ref 1.9–3.5)
GLUCOSE SERPL-MCNC: 89 MG/DL (ref 65–99)
HBA1C MFR BLD: 5.3 % (ref 0–5.6)
HDLC SERPL-MCNC: 32 MG/DL
LDLC SERPL CALC-MCNC: 111 MG/DL
POTASSIUM SERPL-SCNC: 4.5 MMOL/L (ref 3.6–5.5)
PROT SERPL-MCNC: 6.5 G/DL (ref 6–8.2)
SODIUM SERPL-SCNC: 141 MMOL/L (ref 135–145)
TRIGL SERPL-MCNC: 93 MG/DL (ref 0–149)

## 2020-10-31 PROCEDURE — 80061 LIPID PANEL: CPT

## 2020-10-31 PROCEDURE — 84270 ASSAY OF SEX HORMONE GLOBUL: CPT

## 2020-10-31 PROCEDURE — 84481 FREE ASSAY (FT-3): CPT

## 2020-10-31 PROCEDURE — 83002 ASSAY OF GONADOTROPIN (LH): CPT

## 2020-10-31 PROCEDURE — 84403 ASSAY OF TOTAL TESTOSTERONE: CPT

## 2020-10-31 PROCEDURE — 80053 COMPREHEN METABOLIC PANEL: CPT

## 2020-10-31 PROCEDURE — 83036 HEMOGLOBIN GLYCOSYLATED A1C: CPT

## 2020-10-31 PROCEDURE — 82607 VITAMIN B-12: CPT

## 2020-10-31 PROCEDURE — 84439 ASSAY OF FREE THYROXINE: CPT

## 2020-10-31 PROCEDURE — 83001 ASSAY OF GONADOTROPIN (FSH): CPT

## 2020-10-31 PROCEDURE — 84146 ASSAY OF PROLACTIN: CPT

## 2020-10-31 PROCEDURE — 84480 ASSAY TRIIODOTHYRONINE (T3): CPT

## 2020-10-31 PROCEDURE — 36415 COLL VENOUS BLD VENIPUNCTURE: CPT

## 2020-10-31 PROCEDURE — 82306 VITAMIN D 25 HYDROXY: CPT

## 2020-10-31 PROCEDURE — 86376 MICROSOMAL ANTIBODY EACH: CPT

## 2020-10-31 PROCEDURE — 84443 ASSAY THYROID STIM HORMONE: CPT

## 2020-11-01 LAB
25(OH)D3 SERPL-MCNC: 26 NG/ML (ref 30–100)
FSH SERPL-ACNC: <0.3 MIU/ML (ref 1.5–12.4)
LH SERPL-ACNC: <0.3 IU/L (ref 1.7–8.6)
PROLACTIN SERPL-MCNC: 15.8 NG/ML (ref 2.1–17.7)
T3 SERPL-MCNC: 128 NG/DL (ref 60–181)
T3FREE SERPL-MCNC: 3.56 PG/ML (ref 2–4.4)
T4 FREE SERPL-MCNC: 1.18 NG/DL (ref 0.93–1.7)
TESTOST SERPL-MCNC: 481 NG/DL (ref 175–781)
THYROPEROXIDASE AB SERPL-ACNC: <9 IU/ML (ref 0–9)
TSH SERPL DL<=0.005 MIU/L-ACNC: 1.46 UIU/ML (ref 0.38–5.33)
VIT B12 SERPL-MCNC: 381 PG/ML (ref 211–911)

## 2020-11-02 LAB — SHBG SERPL-SCNC: 28 NMOL/L (ref 11–80)

## 2020-11-06 ENCOUNTER — OFFICE VISIT (OUTPATIENT)
Dept: MEDICAL GROUP | Facility: PHYSICIAN GROUP | Age: 50
End: 2020-11-06
Payer: COMMERCIAL

## 2020-11-06 VITALS
OXYGEN SATURATION: 96 % | DIASTOLIC BLOOD PRESSURE: 82 MMHG | WEIGHT: 276 LBS | TEMPERATURE: 98 F | HEIGHT: 73 IN | BODY MASS INDEX: 36.58 KG/M2 | SYSTOLIC BLOOD PRESSURE: 116 MMHG | RESPIRATION RATE: 16 BRPM | HEART RATE: 86 BPM

## 2020-11-06 DIAGNOSIS — E34.9 HYPOTESTOSTERONISM: ICD-10-CM

## 2020-11-06 DIAGNOSIS — E78.2 MIXED HYPERLIPIDEMIA: ICD-10-CM

## 2020-11-06 DIAGNOSIS — Z28.21 INFLUENZA VACCINATION DECLINED: ICD-10-CM

## 2020-11-06 DIAGNOSIS — M51.9 INTERVERTEBRAL DISC RUPTURE: ICD-10-CM

## 2020-11-06 DIAGNOSIS — N52.9 ERECTILE DYSFUNCTION, UNSPECIFIED ERECTILE DYSFUNCTION TYPE: ICD-10-CM

## 2020-11-06 DIAGNOSIS — Z82.49 FAMILY HISTORY OF PREMATURE CAD: ICD-10-CM

## 2020-11-06 DIAGNOSIS — G83.4 CAUDA EQUINA COMPRESSION (HCC): ICD-10-CM

## 2020-11-06 PROCEDURE — 99214 OFFICE O/P EST MOD 30 MIN: CPT | Performed by: FAMILY MEDICINE

## 2020-11-06 RX ORDER — SILDENAFIL 100 MG/1
100 TABLET, FILM COATED ORAL PRN
Qty: 10 TAB | Refills: 3 | Status: SHIPPED | OUTPATIENT
Start: 2020-11-06 | End: 2021-05-14 | Stop reason: SDUPTHER

## 2020-11-06 ASSESSMENT — PATIENT HEALTH QUESTIONNAIRE - PHQ9: CLINICAL INTERPRETATION OF PHQ2 SCORE: 0

## 2020-11-06 NOTE — ASSESSMENT & PLAN NOTE
Results for YESSENIA BYRD (MRN [id number]) as of 11/6/2020 09:59   Ref. Range 3/27/2019 08:24 2/8/2020 12:11 2/8/2020 12:28 10/31/2020 08:30   Cholesterol,Tot Latest Ref Range: 100 - 199 mg/dL 196   162   Triglycerides Latest Ref Range: 0 - 149 mg/dL 116   93   HDL Latest Ref Range: >=40 mg/dL 40   32 (A)   LDL Latest Ref Range: <100 mg/dL 133 (H)   111 (H)   Improving  Continue healthy diet changes

## 2020-11-10 NOTE — PROGRESS NOTES
"Subjective:   Miky Byrd is a 50 y.o. male here today for evaluation and management of:     Mixed hyperlipidemia  Results for MIKY BYRD (MRN [id number]) as of 11/6/2020 09:59   Ref. Range 3/27/2019 08:24 2/8/2020 12:11 2/8/2020 12:28 10/31/2020 08:30   Cholesterol,Tot Latest Ref Range: 100 - 199 mg/dL 196   162   Triglycerides Latest Ref Range: 0 - 149 mg/dL 116   93   HDL Latest Ref Range: >=40 mg/dL 40   32 (A)   LDL Latest Ref Range: <100 mg/dL 133 (H)   111 (H)   Improving  Continue healthy diet changes     Influenza vaccination declined  Dec infl vacc  Education provided.     Hypotestosteronism  Monitored by endocronology  Improvement in ED with supplementation.   Would like rx for viagra.     Erectile dysfunction  Able to get erection but occ it is not sustained enough.   rx for viagra.     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     Cauda equina compression  Improved after spine surgery for decompression 2013         Current medicines (including changes today)  Current Outpatient Medications   Medication Sig Dispense Refill   • sildenafil citrate (VIAGRA) 100 MG tablet Take 1 Tab by mouth as needed for Erectile Dysfunction. 10 Tab 3   • testosterone cypionate (DEPO-TESTOSTERONE) 200 MG/ML Solution injection INJECT 1.25 ML INTRAMUSCULARLY EVERY 14 DAYS  DAYS 10 mL 0   • Syringe/Needle, Disp, (SYRINGE 3CC/50CA8-1/2\") 22G X 1-1/2\" 3 ML Misc For use with Intra-muscular testosterone  injection every 2 weeks. 12 Each 3   • Multiple Vitamins-Minerals (MENS MULTIVITAMIN PO) Take  by mouth.       Current Facility-Administered Medications   Medication Dose Route Frequency Provider Last Rate Last Admin   • testosterone cypionate (DEPO-TESTOSTERONE) injection 250 mg  250 mg Intramuscular Q14 DAYS Sedrick Young M.D.   250 mg at 05/01/20 0956     He  has a past medical history of Hepatic steatosis, Hypogonadism in male, and Vitamin D " "deficiency.    ROS  No chest pain, no shortness of breath, no abdominal pain       Objective:     /82   Pulse 86   Temp 36.7 °C (98 °F) (Temporal)   Resp 16   Ht 1.854 m (6' 1\")   Wt (!) 125.2 kg (276 lb)   SpO2 96%  Body mass index is 36.41 kg/m².   Physical Exam:  Constitutional: Alert, no distress.  Skin: Warm, dry, good turgor, no rashes in visible areas.  Eye: Equal, round and reactive, conjunctiva clear, lids normal.  ENMT: Lips without lesions, good dentition, oropharynx clear.  Neck: Trachea midline, no masses, no thyromegaly. No cervical or supraclavicular lymphadenopathy  Respiratory: Unlabored respiratory effort, lungs clear to auscultation, no wheezes, no ronchi.  Cardiovascular: Normal S1, S2, no murmur, no edema.  Abdomen: Soft, non-tender, no masses, no hepatosplenomegaly.  Psych: Alert and oriented x3, normal affect and mood.        Assessment and Plan:   The following treatment plan was discussed    1. Mixed hyperlipidemia  Improving, not on a statin    2. Influenza vaccination declined  Education provided    3. Family history of premature CAD  - REFERRAL TO CARDIOLOGY    4. Hypotestosteronism  Continue follow up with endocrinology    5. Erectile dysfunction, unspecified erectile dysfunction type  rx for viagra provided.     6. Intervertebral disc rupture  No acute changes.     7. Cauda equina compression (HCC)  No acute changes.       Followup: Return in about 1 year (around 11/6/2021), or if symptoms worsen or fail to improve, for family hx CAD, ED, dyslipidemia.         "

## 2020-11-13 DIAGNOSIS — Z82.49 FAMILY HISTORY OF PREMATURE CAD: ICD-10-CM

## 2021-04-07 DIAGNOSIS — E34.9 HYPOTESTOSTERONISM: ICD-10-CM

## 2021-04-08 ENCOUNTER — TELEPHONE (OUTPATIENT)
Dept: ENDOCRINOLOGY | Facility: MEDICAL CENTER | Age: 51
End: 2021-04-08

## 2021-04-08 NOTE — TELEPHONE ENCOUNTER
Pt. Called and LVM on 3/16 needing to schedule an appointment. I called pt and LVM on 4/8 at 1:46 pm. Will also be reaching out to pt via [employer].

## 2021-04-12 DIAGNOSIS — E29.1 HYPOGONADISM IN MALE: ICD-10-CM

## 2021-04-12 RX ORDER — TESTOSTERONE CYPIONATE 200 MG/ML
INJECTION, SOLUTION INTRAMUSCULAR
Qty: 10 ML | Refills: 0 | Status: SHIPPED | OUTPATIENT
Start: 2021-04-12 | End: 2021-08-02

## 2021-05-14 ENCOUNTER — OFFICE VISIT (OUTPATIENT)
Dept: MEDICAL GROUP | Facility: PHYSICIAN GROUP | Age: 51
End: 2021-05-14
Payer: COMMERCIAL

## 2021-05-14 VITALS
WEIGHT: 279 LBS | OXYGEN SATURATION: 96 % | BODY MASS INDEX: 36.98 KG/M2 | DIASTOLIC BLOOD PRESSURE: 86 MMHG | SYSTOLIC BLOOD PRESSURE: 134 MMHG | RESPIRATION RATE: 16 BRPM | HEIGHT: 73 IN | HEART RATE: 72 BPM | TEMPERATURE: 97.7 F

## 2021-05-14 DIAGNOSIS — Z82.49 FAMILY HISTORY OF HEART DISEASE: ICD-10-CM

## 2021-05-14 DIAGNOSIS — E78.2 MIXED HYPERLIPIDEMIA: ICD-10-CM

## 2021-05-14 DIAGNOSIS — R73.09 ELEVATED HEMOGLOBIN A1C: ICD-10-CM

## 2021-05-14 DIAGNOSIS — E55.9 VITAMIN D DEFICIENCY: ICD-10-CM

## 2021-05-14 DIAGNOSIS — Z82.49 FAMILY HISTORY OF PREMATURE CAD: ICD-10-CM

## 2021-05-14 DIAGNOSIS — E34.9 HYPOTESTOSTERONISM: ICD-10-CM

## 2021-05-14 DIAGNOSIS — E66.9 OBESITY (BMI 30-39.9): ICD-10-CM

## 2021-05-14 PROCEDURE — 99396 PREV VISIT EST AGE 40-64: CPT | Performed by: FAMILY MEDICINE

## 2021-05-14 RX ORDER — SILDENAFIL 100 MG/1
100 TABLET, FILM COATED ORAL PRN
Qty: 10 TABLET | Refills: 3 | Status: SHIPPED | OUTPATIENT
Start: 2021-05-14 | End: 2021-10-08

## 2021-05-14 RX ORDER — ROSUVASTATIN CALCIUM 10 MG/1
10 TABLET, COATED ORAL EVERY EVENING
Qty: 90 TABLET | Refills: 3 | Status: SHIPPED | OUTPATIENT
Start: 2021-05-14 | End: 2021-11-23

## 2021-05-14 ASSESSMENT — PATIENT HEALTH QUESTIONNAIRE - PHQ9: CLINICAL INTERPRETATION OF PHQ2 SCORE: 0

## 2021-05-14 NOTE — ASSESSMENT & PLAN NOTE
Patient has family history of early heart attack his father had an MI at 54, but grandfather also passed away from an MI.  He had other multiple relatives  from heart attacks.  Patient would like to get some cardiac testing done.  He has mild elevation in lipids I will start him on Crestor 10 mg and aspirin 81 mg  Plant-based diet advised  Patient does not smoke  He is on a testosterone supplement for hypotestosterone.  Treadmill stress test in 2019 was normal with no evidence of ischemia  Patient has no chest pain or shortness of breath with exertion, he has no lower extremity edema.

## 2021-05-14 NOTE — PROGRESS NOTES
"Subjective:   Miky Benavidez is a 50 y.o. male here today for evaluation and management of:     Family history of heart disease    Patient has family history of early heart attack his father had an MI at 54, but grandfather also passed away from an MI.  He had other multiple relatives  from heart attacks.  Patient would like to get some cardiac testing done.  He has mild elevation in lipids I will start him on Crestor 10 mg and aspirin 81 mg  Plant-based diet advised  Patient does not smoke  He is on a testosterone supplement for hypotestosterone.  Treadmill stress test in 2019 was normal with no evidence of ischemia  Patient has no chest pain or shortness of breath with exertion, he has no lower extremity edema.      Mixed hyperlipidemia  Chronic condition, was improving  He has work schedule changes so this has made some dietary changes difficult.  He is working on this.  Patient encouraged to continue with mediterranean diet, exercise and weight loss.   Continue to family history of early heart disease in multiple family members will start him on baby aspirin and rosuvastatin 10 mg.  Fasting labs previously ordered.    Hypotestosteronism  This is a chronic condition patient is on testosterone supplement, established with endocrinology in the process of seeing the new endocrinologist in the practice.         Current medicines (including changes today)  Current Outpatient Medications   Medication Sig Dispense Refill   • rosuvastatin (CRESTOR) 10 MG Tab Take 1 tablet by mouth every evening. 90 tablet 3   • aspirin EC (ECOTRIN) 81 MG Tablet Delayed Response Take 1 tablet by mouth every day. 90 tablet 3   • sildenafil citrate (VIAGRA) 100 MG tablet Take 1 tablet by mouth as needed for Erectile Dysfunction. 10 tablet 3   • testosterone cypionate (DEPO-TESTOSTERONE) 200 MG/ML Solution injection INJECT 1.25 ML INTRAMUSCULARLY EVERY 14 DAYS  DAYS 10 mL 0   • Syringe/Needle, Disp, (SYRINGE 3CC/23MG5-7/2\") 22G X " "1-1/2\" 3 ML Misc For use with Intra-muscular testosterone  injection every 2 weeks. 12 Each 3   • Multiple Vitamins-Minerals (MENS MULTIVITAMIN PO) Take  by mouth.       No current facility-administered medications for this visit.     He  has a past medical history of Hepatic steatosis, Hypogonadism in male, and Vitamin D deficiency.    ROS  No chest pain, no shortness of breath, no abdominal pain       Objective:     /86   Pulse 72   Temp 36.5 °C (97.7 °F) (Temporal)   Resp 16   Ht 1.854 m (6' 1\")   Wt (!) 127 kg (279 lb)   SpO2 96%  Body mass index is 36.81 kg/m².   Physical Exam:  Constitutional: Alert, no distress.  Skin: Warm, dry, good turgor, no rashes in visible areas.  Eye: Equal, round and reactive, conjunctiva clear, lids normal.  ENMT: Lips without lesions, good dentition, oropharynx clear.  Neck: Trachea midline, no masses, no thyromegaly. No cervical or supraclavicular lymphadenopathy  Respiratory: Unlabored respiratory effort, lungs clear to auscultation, no wheezes, no ronchi.  Cardiovascular: Normal S1, S2, no murmur, no edema.  Abdomen: Soft, non-tender, no masses, no hepatosplenomegaly.  Psych: Alert and oriented x3, normal affect and mood.        Assessment and Plan:   The following treatment plan was discussed    1. Family history of heart disease      2. Hypotestosteronism    - CBC WITH DIFFERENTIAL; Future  - Comp Metabolic Panel; Future    3. Mixed hyperlipidemia    - Comp Metabolic Panel; Future  - Lipid Profile; Future    4. Family history of premature CAD    - REFERRAL TO CARDIOLOGY    5. Elevated hemoglobin A1c    - HEMOGLOBIN A1C; Future    6. Vitamin D deficiency    - VITAMIN D,25 HYDROXY; Future    7. Obesity (BMI 30-39.9)    - Patient identified as having weight management issue.  Appropriate orders and counseling given.      Followup: Return in about 20 weeks (around 10/1/2021) for Review labs,.         "

## 2021-05-14 NOTE — ASSESSMENT & PLAN NOTE
Chronic condition, was improving  He has work schedule changes so this has made some dietary changes difficult.  He is working on this.  Patient encouraged to continue with mediterranean diet, exercise and weight loss.   Continue to family history of early heart disease in multiple family members will start him on baby aspirin and rosuvastatin 10 mg.  Fasting labs previously ordered.

## 2021-05-14 NOTE — ASSESSMENT & PLAN NOTE
This is a chronic condition patient is on testosterone supplement, established with endocrinology in the process of seeing the new endocrinologist in the practice.

## 2021-07-04 ENCOUNTER — OFFICE VISIT (OUTPATIENT)
Dept: URGENT CARE | Facility: PHYSICIAN GROUP | Age: 51
End: 2021-07-04
Payer: COMMERCIAL

## 2021-07-04 VITALS
DIASTOLIC BLOOD PRESSURE: 82 MMHG | WEIGHT: 232.6 LBS | RESPIRATION RATE: 16 BRPM | HEART RATE: 79 BPM | OXYGEN SATURATION: 97 % | BODY MASS INDEX: 30.83 KG/M2 | HEIGHT: 73 IN | TEMPERATURE: 98.3 F | SYSTOLIC BLOOD PRESSURE: 122 MMHG

## 2021-07-04 DIAGNOSIS — H66.92 ACUTE LEFT OTITIS MEDIA: ICD-10-CM

## 2021-07-04 DIAGNOSIS — B96.89 ACUTE BACTERIAL SINUSITIS: ICD-10-CM

## 2021-07-04 DIAGNOSIS — J01.90 ACUTE BACTERIAL SINUSITIS: ICD-10-CM

## 2021-07-04 PROCEDURE — 99213 OFFICE O/P EST LOW 20 MIN: CPT | Performed by: FAMILY MEDICINE

## 2021-07-04 RX ORDER — AZITHROMYCIN 250 MG/1
TABLET, FILM COATED ORAL
Qty: 6 TABLET | Refills: 0 | Status: SHIPPED | OUTPATIENT
Start: 2021-07-04 | End: 2021-10-08

## 2021-07-04 NOTE — PROGRESS NOTES
Chief Complaint:    Chief Complaint   Patient presents with   • Sinus Problem   • Cough       History of Present Illness:    Symptoms since 7/1/21. He has had left ear discomfort, nasal symptoms, head congestion, and occl purulent mucus from nose. Using OTC meds for symptoms. Wife was seen here on 6/27/21 with similar symptoms, had negative COVID-19 test, and rx'd Z-alexandria with improvement.      Past Medical History:    Past Medical History:   Diagnosis Date   • Hepatic steatosis    • Hypogonadism in male    • Vitamin D deficiency      Past Surgical History:    Past Surgical History:   Procedure Laterality Date   • ERCP W/ INSERTION STENT/TUBE N/A 11/17/2016    Procedure: ERCP W/ INSERTION STENT/TUBE - FOR STENT PLACEMENT/REMOVAL;  Surgeon: Andriy Somers M.D.;  Location: [facility];  Service:    • ERCP W/REMOVAL CALCULUS N/A 11/17/2016    Procedure: ERCP W/REMOVAL CALCULUS;  Surgeon: Andriy Somers M.D.;  Location: [facility];  Service:    • ERCP IN OR  10/7/2016    Procedure: Endoscopic retrogade cholangiopancreatography;  Surgeon: Andriy Somers M.D.;  Location: [facility];  Service:    • SANDI BY LAPAROSCOPY  10/6/2016    Procedure: SANDI BY LAPAROSCOPY - WITH INTRAOPERATIVE CHOLANGIOGRAMS;  Surgeon: Keaton Parisi M.D.;  Location: [facility];  Service:    • LUMBAR LAMINECTOMY DISKECTOMY  8/5/2013    Performed by Escobar Capps M.D. at SURGERY [facility]   • TONSILLECTOMY       Social History:    Social History     Socioeconomic History   • Marital status:      Spouse name: Not on file   • Number of children: Not on file   • Years of education: Not on file   • Highest education level: Not on file   Occupational History   • Not on file   Tobacco Use   • Smoking status: Never Smoker   • Smokeless tobacco: Never Used   Vaping Use   • Vaping Use: Never used   Substance and Sexual Activity   • Alcohol use: Yes     Comment: occ   • Drug use: No   •  "Sexual activity: Yes     Partners: Female   Other Topics Concern   • Not on file   Social History Narrative   • Not on file     Social Determinants of Health     Financial Resource Strain:    • Difficulty of Paying Living Expenses:    Food Insecurity:    • Worried About Running Out of Food in the Last Year:    • Ran Out of Food in the Last Year:    Transportation Needs:    • Lack of Transportation (Medical):    • Lack of Transportation (Non-Medical):    Physical Activity:    • Days of Exercise per Week:    • Minutes of Exercise per Session:    Stress:    • Feeling of Stress :    Social Connections:    • Frequency of Communication with Friends and Family:    • Frequency of Social Gatherings with Friends and Family:    • Attends Islam Services:    • Active Member of Clubs or Organizations:    • Attends Club or Organization Meetings:    • Marital Status:    Intimate Partner Violence:    • Fear of Current or Ex-Partner:    • Emotionally Abused:    • Physically Abused:    • Sexually Abused:      Family History:    Family History   Problem Relation Age of Onset   • Diabetes Mother    • Diabetes Father    • Heart Disease Father      Medications:    Current Outpatient Medications on File Prior to Visit   Medication Sig Dispense Refill   • rosuvastatin (CRESTOR) 10 MG Tab Take 1 tablet by mouth every evening. 90 tablet 3   • aspirin EC (ECOTRIN) 81 MG Tablet Delayed Response Take 1 tablet by mouth every day. 90 tablet 3   • testosterone cypionate (DEPO-TESTOSTERONE) 200 MG/ML Solution injection INJECT 1.25 ML INTRAMUSCULARLY EVERY 14 DAYS  DAYS 10 mL 0   • Syringe/Needle, Disp, (SYRINGE 3CC/56XY4-5/2\") 22G X 1-1/2\" 3 ML Misc For use with Intra-muscular testosterone  injection every 2 weeks. 12 Each 3   • Multiple Vitamins-Minerals (MENS MULTIVITAMIN PO) Take  by mouth.     • sildenafil citrate (VIAGRA) 100 MG tablet Take 1 tablet by mouth as needed for Erectile Dysfunction. (Patient not taking: Reported on " "7/4/2021) 10 tablet 3     No current facility-administered medications on file prior to visit.     Allergies:    No Known Allergies      Vitals:    Vitals:    07/04/21 1032   BP: 122/82   Pulse: 79   Resp: 16   Temp: 36.8 °C (98.3 °F)   TempSrc: Temporal   SpO2: 97%   Weight: 106 kg (232 lb 9.6 oz)   Height: 1.854 m (6' 1\")       Physical Exam:    Constitutional: Vital signs reviewed. Appears well-developed and well-nourished. No acute distress.   Eyes: Sclera white, conjunctivae clear.   ENT: Left TM is mildly-moderately erythematous. External ears normal. External auditory canals normal without discharge. Right TM translucent and non-bulging. Hearing normal. Lips/teeth are normal. Oral mucosa pink and moist. Posterior pharynx: WNL.  Neck: Neck supple.   Cardiovascular: Regular rate and rhythm. No murmur.  Pulmonary/Chest: Respirations non-labored. Clear to auscultation bilaterally.  Lymph: Cervical nodes without tenderness or enlargement.  Musculoskeletal: Normal gait. Normal range of motion. No muscular atrophy or weakness.  Neurological: Alert and oriented to person, place, and time. Muscle tone normal. Coordination normal.   Skin: No rashes or lesions. Warm, dry, normal turgor.  Psychiatric: Normal mood and affect. Behavior is normal. Judgment and thought content normal.       Assessment / Plan:    1. Acute left otitis media  - azithromycin (ZITHROMAX) 250 MG Tab; 2 TABS BY MOUTH ON DAY 1, 1 TAB ON DAYS 2-5.  Dispense: 6 tablet; Refill: 0    2. Acute bacterial sinusitis  - azithromycin (ZITHROMAX) 250 MG Tab; 2 TABS BY MOUTH ON DAY 1, 1 TAB ON DAYS 2-5.  Dispense: 6 tablet; Refill: 0      Work note given - excuse for 7/4 thru and including 7/8/21.    Discussed with him DDX, management options, and risks, benefits, and alternatives to treatment plan agreed upon.    Declines COVID-19 test.    May use OTC meds for symptoms as needed.    Agreeable to medication prescribed.    Discussed expected course of duration, " time for improvement, and to seek follow-up in Emergency Room, urgent care, or with PCP if getting worse at any time or not improving within expected time frame.

## 2021-07-04 NOTE — LETTER
July 4, 2021         Patient: Miky Benavidez   YOB: 1970   Date of Visit: 7/4/2021           To Whom it May Concern:    Miky Benavidez was seen in my clinic on 7/4/2021.     Please excuse from work for 7/4 thru and including 7/8/21 due to medical condition.    If you have any questions or concerns, please don't hesitate to call.        Sincerely,           Houston Conner M.D.  Electronically Signed

## 2021-10-08 ENCOUNTER — OFFICE VISIT (OUTPATIENT)
Dept: MEDICAL GROUP | Facility: PHYSICIAN GROUP | Age: 51
End: 2021-10-08
Payer: COMMERCIAL

## 2021-10-08 VITALS
SYSTOLIC BLOOD PRESSURE: 120 MMHG | DIASTOLIC BLOOD PRESSURE: 78 MMHG | HEIGHT: 73 IN | WEIGHT: 273 LBS | BODY MASS INDEX: 36.18 KG/M2 | OXYGEN SATURATION: 94 % | TEMPERATURE: 97.4 F | RESPIRATION RATE: 16 BRPM | HEART RATE: 65 BPM

## 2021-10-08 DIAGNOSIS — Z82.49 FAMILY HISTORY OF HEART DISEASE: ICD-10-CM

## 2021-10-08 DIAGNOSIS — E55.9 VITAMIN D DEFICIENCY: ICD-10-CM

## 2021-10-08 DIAGNOSIS — E78.2 MIXED HYPERLIPIDEMIA: ICD-10-CM

## 2021-10-08 DIAGNOSIS — E66.9 OBESITY (BMI 30-39.9): ICD-10-CM

## 2021-10-08 PROCEDURE — 99214 OFFICE O/P EST MOD 30 MIN: CPT | Performed by: FAMILY MEDICINE

## 2021-10-08 NOTE — ASSESSMENT & PLAN NOTE
Patient has a family history of early heart attack, his father had an MI at 54 and grandfather also passed away with an MI.  He has multiple other relatives who passed away from heart attacks.  Patient again requests some cardiac testing.  He is currently on Crestor 10 mg aspirin 81 mg.  We reviewed healthy lifestyle and diet changes regular exercise, less processed foods.  Gradual weight loss.  Patient does not smoke.    He is on a testosterone supplement which does increase risk of heart disease he will follow-up with his endocrinology specialist for this.  Patient did have a normal treadmill stress test in 2019.  He has no chest pain or shortness of breath with exertion and no lower extremity edema.  Encouraged him to stay active with regular exercise. He explains [place] and stays active with moving rocks and making fences.

## 2021-10-08 NOTE — ASSESSMENT & PLAN NOTE
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  Encouraged gradual weight loss with portion sizes decreasing high calorie your calorie dense foods.

## 2021-10-08 NOTE — PROGRESS NOTES
"Subjective:   Miky Benavidez is a 51 y.o. male here today for evaluation and management of:     Mixed hyperlipidemia  Labs had shown good improvement in LDL cholesterol, patient is on Crestor 10 mg tolerating this well, is also on aspirin 81 mg.    Family history of heart disease  Patient has a family history of early heart attack, his father had an MI at 54 and grandfather also passed away with an MI.  He has multiple other relatives who passed away from heart attacks.  Patient again requests some cardiac testing.  He is currently on Crestor 10 mg aspirin 81 mg.  We reviewed healthy lifestyle and diet changes regular exercise, less processed foods.  Gradual weight loss.  Patient does not smoke.    He is on a testosterone supplement which does increase risk of heart disease he will follow-up with his endocrinology specialist for this.  Patient did have a normal treadmill stress test in 2019.  He has no chest pain or shortness of breath with exertion and no lower extremity edema.  Encouraged him to stay active with regular exercise. He explains [place] and stays active with moving rocks and making fences.    Vitamin d deficiency  He continues on vitamin D supplement.  Repeat labs ordered.    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  Encouraged gradual weight loss with portion sizes decreasing high calorie your calorie dense foods.     All questions regarding Covid vaccine answered and patient encouraged to get Covid and flu vaccinations.    Current medicines (including changes today)  Current Outpatient Medications   Medication Sig Dispense Refill   • rosuvastatin (CRESTOR) 10 MG Tab Take 1 tablet by mouth every evening. 90 tablet 3   • aspirin EC (ECOTRIN) 81 MG Tablet Delayed Response Take 1 tablet by mouth every day. 90 tablet 3   • Syringe/Needle, Disp, (SYRINGE 3CC/94CS9-5/2\") 22G X 1-1/2\" 3 ML Misc For " "use with Intra-muscular testosterone  injection every 2 weeks. 12 Each 3     No current facility-administered medications for this visit.     He  has a past medical history of Hepatic steatosis, Hypogonadism in male, and Vitamin D deficiency.    ROS  No chest pain, no shortness of breath, no abdominal pain       Objective:     /78 (BP Location: Right arm, Patient Position: Sitting, BP Cuff Size: Large adult)   Pulse 65   Temp 36.3 °C (97.4 °F) (Temporal)   Resp 16   Ht 1.854 m (6' 1\")   Wt 124 kg (273 lb)   SpO2 94%  Body mass index is 36.02 kg/m².   Physical Exam:  Constitutional: Alert, no distress.  Skin: Warm, dry, good turgor, no rashes in visible areas.  Eye: Equal, round and reactive, conjunctiva clear, lids normal.  ENMT: Lips without lesions, good dentition, oropharynx clear.  Neck: Trachea midline, no masses, no thyromegaly. No cervical or supraclavicular lymphadenopathy  Respiratory: Unlabored respiratory effort, lungs clear to auscultation, no wheezes, no ronchi.  Cardiovascular: Normal S1, S2, no murmur, no edema.  Abdomen: Soft, non-tender, no masses, no hepatosplenomegaly.  Psych: Alert and oriented x3, normal affect and mood.        Assessment and Plan:   The following treatment plan was discussed    1. Mixed hyperlipidemia    - REFERRAL TO CARDIOLOGY  - Comp Metabolic Panel; Future  - Lipid Profile; Future    2. Family history of heart disease    - REFERRAL TO CARDIOLOGY    3. Obesity (BMI 30-39.9)      4. Vitamin D deficiency    - VITAMIN D,25 HYDROXY; Future      Followup: Return in about 1 year (around 10/8/2022) for annual, dec gfr, vit d def, .         "

## 2021-10-08 NOTE — ASSESSMENT & PLAN NOTE
Labs had shown good improvement in LDL cholesterol, patient is on Crestor 10 mg tolerating this well, is also on aspirin 81 mg.

## 2021-10-18 ENCOUNTER — APPOINTMENT (OUTPATIENT)
Dept: ENDOCRINOLOGY | Facility: MEDICAL CENTER | Age: 51
End: 2021-10-18
Attending: NURSE PRACTITIONER
Payer: COMMERCIAL

## 2021-10-19 ENCOUNTER — HOSPITAL ENCOUNTER (EMERGENCY)
Facility: MEDICAL CENTER | Age: 51
End: 2021-10-19
Attending: EMERGENCY MEDICINE
Payer: COMMERCIAL

## 2021-10-19 ENCOUNTER — APPOINTMENT (OUTPATIENT)
Dept: RADIOLOGY | Facility: MEDICAL CENTER | Age: 51
End: 2021-10-19
Attending: EMERGENCY MEDICINE
Payer: COMMERCIAL

## 2021-10-19 VITALS
BODY MASS INDEX: 35.62 KG/M2 | TEMPERATURE: 98.7 F | OXYGEN SATURATION: 94 % | RESPIRATION RATE: 18 BRPM | SYSTOLIC BLOOD PRESSURE: 113 MMHG | DIASTOLIC BLOOD PRESSURE: 82 MMHG | WEIGHT: 270 LBS | HEART RATE: 85 BPM

## 2021-10-19 DIAGNOSIS — U07.1 COVID: ICD-10-CM

## 2021-10-19 LAB
FLUAV RNA SPEC QL NAA+PROBE: NEGATIVE
FLUBV RNA SPEC QL NAA+PROBE: NEGATIVE
RSV RNA SPEC QL NAA+PROBE: NEGATIVE
SARS-COV-2 RNA RESP QL NAA+PROBE: DETECTED
SPECIMEN SOURCE: ABNORMAL

## 2021-10-19 PROCEDURE — 700111 HCHG RX REV CODE 636 W/ 250 OVERRIDE (IP): Performed by: EMERGENCY MEDICINE

## 2021-10-19 PROCEDURE — 700102 HCHG RX REV CODE 250 W/ 637 OVERRIDE(OP): Performed by: EMERGENCY MEDICINE

## 2021-10-19 PROCEDURE — A9270 NON-COVERED ITEM OR SERVICE: HCPCS | Performed by: EMERGENCY MEDICINE

## 2021-10-19 PROCEDURE — 71045 X-RAY EXAM CHEST 1 VIEW: CPT

## 2021-10-19 PROCEDURE — 0241U HCHG SARS-COV-2 COVID-19 NFCT DS RESP RNA 4 TRGT MIC: CPT

## 2021-10-19 PROCEDURE — C9803 HOPD COVID-19 SPEC COLLECT: HCPCS | Performed by: EMERGENCY MEDICINE

## 2021-10-19 PROCEDURE — 99284 EMERGENCY DEPT VISIT MOD MDM: CPT

## 2021-10-19 PROCEDURE — M0243 CASIRIVI AND IMDEVI INFUSION: HCPCS

## 2021-10-19 RX ORDER — IBUPROFEN 600 MG/1
600 TABLET ORAL ONCE
Status: COMPLETED | OUTPATIENT
Start: 2021-10-19 | End: 2021-10-19

## 2021-10-19 RX ORDER — ACETAMINOPHEN 500 MG
1000 TABLET ORAL ONCE
Status: COMPLETED | OUTPATIENT
Start: 2021-10-19 | End: 2021-10-19

## 2021-10-19 RX ORDER — IMDEVIMAB 1332 MG/11.1ML
300 INJECTION, SOLUTION, CONCENTRATE INTRAVENOUS ONCE
Status: COMPLETED | OUTPATIENT
Start: 2021-10-19 | End: 2021-10-19

## 2021-10-19 RX ORDER — CASIRIVIMAB 1332 MG/11.1ML
300 INJECTION, SOLUTION, CONCENTRATE INTRAVENOUS ONCE
Status: COMPLETED | OUTPATIENT
Start: 2021-10-19 | End: 2021-10-19

## 2021-10-19 RX ADMIN — IMDEVIMAB 300 MG: 1332 INJECTION, SOLUTION, CONCENTRATE INTRAVENOUS at 17:04

## 2021-10-19 RX ADMIN — ACETAMINOPHEN 1000 MG: 500 TABLET ORAL at 15:18

## 2021-10-19 RX ADMIN — IMDEVIMAB 300 MG: 1332 INJECTION, SOLUTION, CONCENTRATE INTRAVENOUS at 17:03

## 2021-10-19 RX ADMIN — CASIRIVIMAB 300 MG: 1332 INJECTION, SOLUTION, CONCENTRATE INTRAVENOUS at 17:08

## 2021-10-19 RX ADMIN — IBUPROFEN 600 MG: 600 TABLET, FILM COATED ORAL at 15:15

## 2021-10-19 NOTE — ED PROVIDER NOTES
ED Provider Note    Scribed for Kevin Monsivais M.D. by Char Villalobos. 10/19/2021  2:22 PM    Primary care provider: Rosa Riddle M.D.  Means of arrival: Walk in  History obtained from: Patient  History limited by: None    CHIEF COMPLAINT  Chief Complaint   Patient presents with    Cough     + Covid     Fever       HPI  Miky Benavidez is a 51 y.o. male who presents to the Emergency Department for dry cough with an onset of 3 days ago. He describes there is a burning sensation in his chest with his cough. He states he and his wife recently tested positive for COVID. He has also not been eating or hydrating well. He has been taking Ibuprofen and Tylenol last dose this morning with some mild relief. Exacerbating factors include taking deep breaths. No alleviating factors were identified. He reports associated intermittent shortness of breath with exertion, pleuritic chest pain, subjective fever, diffuse headache, and decreased appetite. He denies any associated congestion, sore throat, or decreased smell/taste. He is not vaccinated against COVID.     REVIEW OF SYSTEMS  Pertinent positives include cough, pleuritic chest pain, shortness of breath with exertion, subjective fever, diffuse headache, and decreased appetite. Pertinent negatives include no congestion, sore throat, or decrease smell/taste.   See HPI for further details.     PAST MEDICAL HISTORY   has a past medical history of Hepatic steatosis, Hypogonadism in male, and Vitamin D deficiency.    SURGICAL HISTORY   has a past surgical history that includes tonsillectomy; lumbar laminectomy diskectomy (8/5/2013); felix by laparoscopy (10/6/2016); ercp in or (10/7/2016); ercp w/ insertion stent/tube (N/A, 11/17/2016); and ercp w/removal calculus (N/A, 11/17/2016).    SOCIAL HISTORY  Social History     Tobacco Use    Smoking status: Never Smoker    Smokeless tobacco: Never Used   Vaping Use    Vaping Use: Never used   Substance Use Topics    Alcohol use: Yes      Comment: occ    Drug use: No      Social History     Substance and Sexual Activity   Drug Use No       FAMILY HISTORY  Family History   Problem Relation Age of Onset    Diabetes Mother     Diabetes Father     Heart Disease Father        CURRENT MEDICATIONS  Home Medications    **Home medications have not yet been reviewed for this encounter**       Current medications can be seen on the nurse's note.      ALLERGIES  No Known Allergies    PHYSICAL EXAM  VITAL SIGNS: /81   Pulse (!) 104   Temp 36.3 °C (97.4 °F) (Temporal)   Resp 20   Wt 122 kg (270 lb)   SpO2 95%   BMI 35.62 kg/m²     Constitutional: Well developed, Well nourished, Mild distress, Non-toxic appearance. Obese  HENT: Normocephalic, Atraumatic, Bilateral external ears normal, Oropharynx dry, No oral exudates.   Eyes: PERRLA, EOMI, Conjunctiva normal, No discharge.   Neck: No tenderness, Supple, No stridor.   Lymphatic: No lymphadenopathy noted.   Cardiovascular: Slightly tachycardic, Normal rhythm.   Thorax & Lungs: Clear to auscultation bilaterally, actively coughing. No respiratory distress, No wheezing, No crackles.   Abdomen: Soft, No tenderness, No masses, No pulsatile masses.   Skin: Warm, Dry, No erythema, No rash.   Extremities:, No edema No cyanosis.   Musculoskeletal: No tenderness to palpation or major deformities noted. Intact pulses.   Neurologic: Awake, alert. Moves all extremities spontaneously.  Psychiatric: Affect normal, Judgment normal, Mood normal.     LABS  Results for orders placed or performed during the hospital encounter of 10/19/21   COV-2, FLU A/B, AND RSV BY PCR (2-4 HOURS CEPHEID): Collect NP swab in VTM    Specimen: Respirate   Result Value Ref Range    Influenza virus A RNA Negative Negative    Influenza virus B, PCR Negative Negative    RSV, PCR Negative Negative    SARS-CoV-2 by PCR DETECTED (AA)     SARS-CoV-2 Source NP Swab       All labs reviewed by me.    COURSE & MEDICAL DECISION MAKING  Pertinent Labs &  Imaging studies reviewed. (See chart for details)    2:22 PM - Patient seen and examined at bedside. Discussed plan of care with the patient. He was offered an antibody treatment to help prevent hospitalization from COVID. However, his COVID test at the ED will need to be positive prior to receiving treatment. He was given the opportunity to ask questions regarding treatment. He is understanding and agreeable to plan. Patient will be treated with Regen- mg injection. Ordered COV-2 Flu A/B RSV to evaluate his symptoms.     2:58 PM - Patient will be treated with Tylenol 1000 mg and Ibuprofen 600 mg for his symptoms.    4:24 PM - Patient was reevaluated at bedside. He is resting in bed feeling unchanged. Discussed lab results with the patient and informed them that he is positive for COVID. He will be treated with Regen-COV.     The patient will return for new or worsening symptoms and is stable at the time of discharge. Patient verbalizes understanding and agreement to this plan of care. The patient is referred to a primary physician for blood pressure management, diabetic screening, and for all other preventative health concerns.    Monoclonal antibody infusion EUA progress note    This patient is considered a candidate for monoclonal antibody infusion to treat/prevent high risk SARS-CoV-2 virus infection.      Criteria for use (treatment):  -Mild to moderate illness for less than 10 days and is high risk for progressing to severe COVID-19 and/or hospitalization.    -Not hospitalized.   -Does not require oxygen therapy due to COVID-19.    ->40 kg and 12 years old or greater    Criteria for use (post-exposure prophylaxis):  -High risk for progressing to severe COVID-19 and/or hospitalization  -Exposed (close contact per CDC) to COVID-19 positive individual OR high risk of exposure to COVID-19 because of positive individual in institutional setting   -Not fully vaccinated or not expected to mount an adequate  immune response due to immunocompromising condition or immunosuppressive medication  ->40 kg and 12 years old or greater    Date of positive COVID-19 test (treatment) or known exposure (post-exposure prophylaxis): Positive test today    Vaccinated for COVID-19 (yes/no): no      Symptoms of COVID-19 (if being used for treatment): cough, pleuritic chest pain, shortness of breath with exertion, subjective fever, diffuse headache, and decreased appetite.    High risk criteria:   -Age of 65 or greater  -Body mass index of 25 kg/m2or greater  -12-17 years of age and have  -BMI greater than or equal to 85% of their age and gender based CDC growth chart  -Chronic kidney disease  -Diabetes  -Pregnancy  -Immunosuppressive disease  -Receiving immunosuppressive treatment  -Chronic lung disease  -65 years of age or greater  -Cardiovascular disease  -Hypertension  -Medical related technological dependence  -Sickle cell disease  -Congenital or acquired heart disease  -Neurodevelopmental disorder  -Other medical conditions or factors that place individual patients at high risk for progression to severe COVID-19 and authorization of REGEN-COV under the EUA is not limited to the medical conditions or factors listed above. Healthcare providers should consider the benefit-risk for an individual patient.    This patient has been given the EUA patient fact sheet and consents to receiving this medication.    Decision Making:  Patient who is Covid positive we will treat the patient with monoclonal antibodies, the patient will monitor his O2 saturations at home, return with worsening symptoms.    DISPOSITION:  Patient will be discharged home in stable condition.    FOLLOW UP:  [facility], Emergency Dept  [address] [facility] [address] [phone]    If symptoms worsen    FINAL IMPRESSION  1. Char CLEMENTS (Scribe), am scribing for, and in the presence of, Kevin Monsivais  M.D..    Electronically signed by: Char Villalobos (Scribe), 10/19/2021    I, Kevin Monsivais M.D. personally performed the services described in this documentation, as scribed by Char Villalobos in my presence, and it is both accurate and complete.    E    The note accurately reflects work and decisions made by me.  Kevin Monsivais M.D.  10/19/2021  5:58 PM

## 2021-10-19 NOTE — ED TRIAGE NOTES
Pt to triage, c/o cough/ fever/ chills, States he tested Covid + , and his wife as well. Pt is not vaccinated. Pt in Covid precautions

## 2021-10-20 NOTE — ED NOTES
Pt discharge home,tolerated medication no s/s reaction. Pt given discharge instructions and prescription. Pt verbalized understanding, all questions answered ,vss upon d/c. Pt steady on feet upon discharge   denies pain/discomfort

## 2021-10-22 ENCOUNTER — APPOINTMENT (OUTPATIENT)
Dept: RADIOLOGY | Facility: IMAGING CENTER | Age: 51
End: 2021-10-22
Attending: PHYSICIAN ASSISTANT
Payer: COMMERCIAL

## 2021-10-22 ENCOUNTER — OFFICE VISIT (OUTPATIENT)
Dept: URGENT CARE | Facility: PHYSICIAN GROUP | Age: 51
End: 2021-10-22
Payer: COMMERCIAL

## 2021-10-22 VITALS
HEART RATE: 82 BPM | TEMPERATURE: 98.6 F | WEIGHT: 264 LBS | BODY MASS INDEX: 34.99 KG/M2 | OXYGEN SATURATION: 88 % | SYSTOLIC BLOOD PRESSURE: 118 MMHG | RESPIRATION RATE: 20 BRPM | DIASTOLIC BLOOD PRESSURE: 84 MMHG | HEIGHT: 73 IN

## 2021-10-22 DIAGNOSIS — R06.02 SOB (SHORTNESS OF BREATH): ICD-10-CM

## 2021-10-22 DIAGNOSIS — U07.1 COVID-19 VIRUS INFECTION: ICD-10-CM

## 2021-10-22 PROCEDURE — 99214 OFFICE O/P EST MOD 30 MIN: CPT | Mod: CS | Performed by: PHYSICIAN ASSISTANT

## 2021-10-22 PROCEDURE — 71046 X-RAY EXAM CHEST 2 VIEWS: CPT | Mod: TC,FY | Performed by: PHYSICIAN ASSISTANT

## 2021-10-22 RX ORDER — IBUPROFEN 200 MG
200 TABLET ORAL EVERY 6 HOURS PRN
COMMUNITY
End: 2022-01-03

## 2021-10-22 RX ORDER — DEXAMETHASONE 6 MG/1
6 TABLET ORAL DAILY
Qty: 7 TABLET | Refills: 0 | Status: SHIPPED | OUTPATIENT
Start: 2021-10-22 | End: 2021-10-31 | Stop reason: SDUPTHER

## 2021-10-22 RX ORDER — ACETAMINOPHEN 325 MG/1
650 TABLET ORAL EVERY 4 HOURS PRN
COMMUNITY
End: 2022-01-03

## 2021-10-22 RX ORDER — GUAIFENESIN 600 MG/1
600 TABLET, EXTENDED RELEASE ORAL EVERY 12 HOURS
COMMUNITY
End: 2022-01-03

## 2021-10-22 NOTE — PROGRESS NOTES
"Chief Complaint   Patient presents with   • Coronavirus Screening     positive results--SOB getting worse for the past few days        HISTORY OF PRESENT ILLNESS: Patient is a 51 y.o. male who presents today for the following:    Patient is Covid positive, evaluated in the ED 10/19 where he received monoclonal antibodies  Has had worsening shortness of breath  Symptoms started 3 days prior to ED visit    Patient Active Problem List    Diagnosis Date Noted   • Mixed hyperlipidemia 05/28/2019   • Influenza vaccination declined 11/21/2018   • Obesity (BMI 30-39.9) 11/20/2018   • Calculus of bile duct without mention of cholecystitis or obstruction 11/17/2016   • History of biliary stent insertion 10/24/2016   • Common wart 07/17/2015   • Hypotestosteronism 07/14/2015   • Family history of heart disease 07/09/2015   • Erectile dysfunction 07/08/2015   • SOB (shortness of breath) 07/08/2015   • Vitamin d deficiency 08/05/2013   • Intervertebral disc rupture 08/04/2013   • Cauda equina compression (HCC) 08/04/2013       Allergies:Patient has no known allergies.    Current Outpatient Medications Ordered in Epic   Medication Sig Dispense Refill   • Pseudoephedrine HCl (SUDAFED 24 HOUR PO) Take  by mouth.     • guaiFENesin ER (MUCINEX) 600 MG TABLET SR 12 HR Take 600 mg by mouth every 12 hours.     • acetaminophen (TYLENOL) 325 MG Tab Take 650 mg by mouth every four hours as needed.     • ibuprofen (MOTRIN) 200 MG Tab Take 200 mg by mouth every 6 hours as needed.     • dexamethasone (DECADRON) 6 MG Tab Take 1 Tablet by mouth every day for 7 days. 7 Tablet 0   • rosuvastatin (CRESTOR) 10 MG Tab Take 1 tablet by mouth every evening. (Patient not taking: Reported on 10/22/2021) 90 tablet 3   • aspirin EC (ECOTRIN) 81 MG Tablet Delayed Response Take 1 tablet by mouth every day. (Patient not taking: Reported on 10/22/2021) 90 tablet 3   • Syringe/Needle, Disp, (SYRINGE 3CC/72IF2-2/2\") 22G X 1-1/2\" 3 ML Misc For use with " "Intra-muscular testosterone  injection every 2 weeks. 12 Each 3     No current Epic-ordered facility-administered medications on file.       Past Medical History:   Diagnosis Date   • Hepatic steatosis    • Hypogonadism in male    • Vitamin D deficiency        Social History     Tobacco Use   • Smoking status: Never Smoker   • Smokeless tobacco: Never Used   Vaping Use   • Vaping Use: Never used   Substance Use Topics   • Alcohol use: Yes     Comment: occ   • Drug use: No       Family Status   Relation Name Status   • Mo  Alive   • Fa  Alive     Family History   Problem Relation Age of Onset   • Diabetes Mother    • Diabetes Father    • Heart Disease Father        Review of Systems:   Pertinent systems reviewed with the patient.    Exam:  /84   Pulse 82   Temp 37 °C (98.6 °F)   Resp 20   Ht 1.854 m (6' 1\")   Wt 120 kg (264 lb)   SpO2 88%    Repeat SPO2 92% on room air, at rest  General: Well developed, well nourished. No distress.    Pulmonary: Unlabored respiratory effort. Lungs clear to auscultation, no wheezes, no rhonchi.    Cardiovascular: Regular rate and rhythm without murmur.   Neurologic: Grossly nonfocal. No facial asymmetry noted.  Skin: Warm, dry, good turgor. No rashes in visible areas.   Psych: Normal mood. Alert and oriented to person, place and time.    Chest x-ray, per radiology:  Bilateral peripheral alveolar opacities consistent with edema or pneumonia. Differential diagnosis would include Covid 19 pneumonia. No lobar consolidation.    Assessment/Plan:  COVID PNA on xray. Starting steroids.  Discussed appropriate over-the-counter symptomatic medication, and when to return to clinic. Follow up for worsening or persistent symptoms.  1. COVID-19 virus infection     2. SOB (shortness of breath)  DX-CHEST-2 VIEWS    dexamethasone (DECADRON) 6 MG Tab       "

## 2021-10-31 ENCOUNTER — NON-PROVIDER VISIT (OUTPATIENT)
Dept: URGENT CARE | Facility: PHYSICIAN GROUP | Age: 51
End: 2021-10-31

## 2021-10-31 ENCOUNTER — APPOINTMENT (OUTPATIENT)
Dept: RADIOLOGY | Facility: IMAGING CENTER | Age: 51
End: 2021-10-31
Attending: PHYSICIAN ASSISTANT
Payer: COMMERCIAL

## 2021-10-31 DIAGNOSIS — R06.02 SOB (SHORTNESS OF BREATH): ICD-10-CM

## 2021-10-31 PROCEDURE — 71046 X-RAY EXAM CHEST 2 VIEWS: CPT | Mod: TC,FY | Performed by: PHYSICIAN ASSISTANT

## 2021-10-31 RX ORDER — DEXAMETHASONE 6 MG/1
6 TABLET ORAL DAILY
Qty: 7 TABLET | Refills: 0 | Status: SHIPPED | OUTPATIENT
Start: 2021-10-31 | End: 2021-11-07

## 2021-10-31 NOTE — PROGRESS NOTES
Per my interpretation, patient's chest x-ray appears slightly worse than his previous on 10/22, consistent with Covid pneumonia.  Patient is satting at 94% on room air.  Repeating steroids.  Discussed red flags and ER precautions.  Follow-up for any worsening symptoms.

## 2021-11-23 ENCOUNTER — PATIENT MESSAGE (OUTPATIENT)
Dept: MEDICAL GROUP | Facility: PHYSICIAN GROUP | Age: 51
End: 2021-11-23

## 2021-11-23 DIAGNOSIS — E78.2 MIXED HYPERLIPIDEMIA: ICD-10-CM

## 2021-11-23 RX ORDER — ROSUVASTATIN CALCIUM 10 MG/1
10 TABLET, COATED ORAL EVERY EVENING
Qty: 90 TABLET | Refills: 2 | Status: SHIPPED | OUTPATIENT
Start: 2021-11-23 | End: 2022-11-07 | Stop reason: SDUPTHER

## 2021-11-23 NOTE — TELEPHONE ENCOUNTER
From: Miky Benavidez  To: Physician Rosa Riddle  Sent: 11/23/2021 2:22 AM PST  Subject: Prescription Question    Will you please re fill my 2 prescription 1 was aspirin and I think the other was for cholesterol   Thank you

## 2021-11-23 NOTE — PATIENT COMMUNICATION
Received request via: Patient    Was the patient seen in the last year in this department? Yes    Does the patient have an active prescription (recently filled or refills available) for medication(s) requested? No     Requested Prescriptions     Pending Prescriptions Disp Refills   • rosuvastatin (CRESTOR) 10 MG Tab 90 Tablet 2     Sig: Take 1 Tablet by mouth every evening.   • aspirin EC (ECOTRIN) 81 MG Tablet Delayed Response 90 Tablet 2     Sig: Take 1 Tablet by mouth every day.

## 2022-01-03 ENCOUNTER — OFFICE VISIT (OUTPATIENT)
Dept: URGENT CARE | Facility: PHYSICIAN GROUP | Age: 52
End: 2022-01-03

## 2022-01-03 VITALS
SYSTOLIC BLOOD PRESSURE: 135 MMHG | BODY MASS INDEX: 33.5 KG/M2 | DIASTOLIC BLOOD PRESSURE: 87 MMHG | HEIGHT: 74 IN | HEART RATE: 76 BPM | TEMPERATURE: 97.4 F | RESPIRATION RATE: 16 BRPM | OXYGEN SATURATION: 97 % | WEIGHT: 261 LBS

## 2022-01-03 DIAGNOSIS — Z02.4 ENCOUNTER FOR DEPARTMENT OF TRANSPORTATION (DOT) EXAMINATION FOR DRIVING LICENSE RENEWAL: ICD-10-CM

## 2022-01-03 PROCEDURE — 7100 PR DOT PHYSICAL: Performed by: NURSE PRACTITIONER

## 2022-01-03 NOTE — PROGRESS NOTES
Subjective:     Miky Benavidez is a 51 y.o. 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    ROS    PMH:   Past Medical History:   Diagnosis Date   • Hepatic steatosis    • Hypogonadism in male    • Vitamin D deficiency      ALLERGIES: No Known Allergies  SURGHX:   Past Surgical History:   Procedure Laterality Date   • ERCP W/ INSERTION STENT/TUBE N/A 11/17/2016    Procedure: ERCP W/ INSERTION STENT/TUBE - FOR STENT PLACEMENT/REMOVAL;  Surgeon: Andriy Somers M.D.;  Location: [facility];  Service:    • ERCP W/REMOVAL CALCULUS N/A 11/17/2016    Procedure: ERCP W/REMOVAL CALCULUS;  Surgeon: Andriy Somers M.D.;  Location: SURGERY [facility];  Service:    • ERCP IN OR  10/7/2016    Procedure: Endoscopic retrogade cholangiopancreatography;  Surgeon: Andriy Somers M.D.;  Location: SURGERY [facility];  Service:    • SANDI BY LAPAROSCOPY  10/6/2016    Procedure: SANDI BY LAPAROSCOPY - WITH INTRAOPERATIVE CHOLANGIOGRAMS;  Surgeon: Keaton Parisi M.D.;  Location: SURGERY [facility];  Service:    • LUMBAR LAMINECTOMY DISKECTOMY  8/5/2013    Performed by Escobar Capps M.D. at SURGERY [facility]   • TONSILLECTOMY       SOCHX:   Social History     Socioeconomic History   • Marital status:      Spouse name: Not on file   • Number of children: Not on file   • Years of education: Not on file   • Highest education level: Not on file   Occupational History   • Not on file   Tobacco Use   • Smoking status: Never Smoker   • Smokeless tobacco: Never Used   Vaping Use   • Vaping Use: Never used   Substance and Sexual Activity   • Alcohol use: Not Currently     Comment: occ   • Drug use: No   • Sexual activity: Yes     Partners: Female   Other Topics Concern   • Not on file   Social History Narrative   • Not on file     Social Determinants of Health     Financial Resource Strain:    • Difficulty of Paying Living Expenses: Not  "on file   Food Insecurity:    • Worried About Running Out of Food in the Last Year: Not on file   • Ran Out of Food in the Last Year: Not on file   Transportation Needs:    • Lack of Transportation (Medical): Not on file   • Lack of Transportation (Non-Medical): Not on file   Physical Activity:    • Days of Exercise per Week: Not on file   • Minutes of Exercise per Session: Not on file   Stress:    • Feeling of Stress : Not on file   Social Connections:    • Frequency of Communication with Friends and Family: Not on file   • Frequency of Social Gatherings with Friends and Family: Not on file   • Attends Scientologist Services: Not on file   • Active Member of Clubs or Organizations: Not on file   • Attends Club or Organization Meetings: Not on file   • Marital Status: Not on file   Intimate Partner Violence:    • Fear of Current or Ex-Partner: Not on file   • Emotionally Abused: Not on file   • Physically Abused: Not on file   • Sexually Abused: Not on file   Housing Stability:    • Unable to Pay for Housing in the Last Year: Not on file   • Number of Places Lived in the Last Year: Not on file   • Unstable Housing in the Last Year: Not on file     FH:   Family History   Problem Relation Age of Onset   • Diabetes Mother    • Diabetes Father    • Heart Disease Father          Objective:   /87   Pulse 76   Temp 36.3 °C (97.4 °F) (Temporal)   Resp 16   Ht 1.867 m (6' 1.5\")   Wt 118 kg (261 lb)   SpO2 97%   BMI 33.97 kg/m²     Physical Exam  Please see scanned physical assessment report.  Assessment/Plan:   Assessment      1. Encounter for Department of Transportation (DOT) examination for driving license renewal     Patient borderline for stage I hypertension.  He was encouraged to monitor blood pressure closely and follow-up with primary care physician.  No prior history of hypertension.  He states that he has been under a lot of stress due to his mother and mother-in-law moving into his home recently.  He was " cleared for 2-year certificate while wearing corrective lenses.

## 2022-04-15 DIAGNOSIS — E55.9 VITAMIN D DEFICIENCY: ICD-10-CM

## 2022-04-15 DIAGNOSIS — E66.9 OBESITY (BMI 30-39.9): ICD-10-CM

## 2022-04-15 DIAGNOSIS — E34.9 HYPOTESTOSTERONISM: ICD-10-CM

## 2022-04-15 DIAGNOSIS — E78.2 MIXED HYPERLIPIDEMIA: ICD-10-CM

## 2022-04-15 DIAGNOSIS — Z82.49 FAMILY HISTORY OF HEART DISEASE: ICD-10-CM

## 2022-04-15 DIAGNOSIS — R73.09 ELEVATED HEMOGLOBIN A1C: ICD-10-CM

## 2022-05-02 ENCOUNTER — TELEPHONE (OUTPATIENT)
Dept: CARDIOLOGY | Facility: MEDICAL CENTER | Age: 52
End: 2022-05-02
Payer: COMMERCIAL

## 2022-05-02 NOTE — TELEPHONE ENCOUNTER
Chart Prep      Spoke to pt regarding new pt appt and he stated that he was seen here in [facility] few years back which he does have records here. He also stated that he has not had any recent blood work nor imaging. Pt was reminded on their appt date and time.

## 2022-05-09 ENCOUNTER — OFFICE VISIT (OUTPATIENT)
Dept: CARDIOLOGY | Facility: MEDICAL CENTER | Age: 52
End: 2022-05-09
Attending: FAMILY MEDICINE
Payer: COMMERCIAL

## 2022-05-09 VITALS
DIASTOLIC BLOOD PRESSURE: 94 MMHG | HEART RATE: 84 BPM | OXYGEN SATURATION: 97 % | BODY MASS INDEX: 36.45 KG/M2 | SYSTOLIC BLOOD PRESSURE: 144 MMHG | RESPIRATION RATE: 16 BRPM | WEIGHT: 275 LBS | HEIGHT: 73 IN

## 2022-05-09 DIAGNOSIS — Z82.49 FAMILY HISTORY OF HEART ATTACK: ICD-10-CM

## 2022-05-09 DIAGNOSIS — Z82.49 FAMILY HISTORY OF HEART DISEASE: ICD-10-CM

## 2022-05-09 DIAGNOSIS — R94.39 ABNORMAL STRESS TEST: ICD-10-CM

## 2022-05-09 DIAGNOSIS — E66.9 OBESITY (BMI 30-39.9): ICD-10-CM

## 2022-05-09 DIAGNOSIS — R06.02 SHORTNESS OF BREATH: ICD-10-CM

## 2022-05-09 DIAGNOSIS — E78.2 MIXED HYPERLIPIDEMIA: ICD-10-CM

## 2022-05-09 LAB — EKG IMPRESSION: NORMAL

## 2022-05-09 PROCEDURE — 99203 OFFICE O/P NEW LOW 30 MIN: CPT | Performed by: NURSE PRACTITIONER

## 2022-05-09 PROCEDURE — 93000 ELECTROCARDIOGRAM COMPLETE: CPT | Performed by: INTERNAL MEDICINE

## 2022-05-09 RX ORDER — METOPROLOL SUCCINATE 25 MG/1
12.5 TABLET, EXTENDED RELEASE ORAL DAILY
Qty: 45 TABLET | Refills: 3 | Status: SHIPPED | OUTPATIENT
Start: 2022-05-09 | End: 2022-11-07 | Stop reason: SDUPTHER

## 2022-05-09 NOTE — PATIENT INSTRUCTIONS
Checking Blood Pressure:  -Blood pressure cuff, spend in the $40-65, with good return policy  -It should be automatic, upper arm, measure your arm to get the correct size, probably adult Large  -Put the cuff in place, rest arm on table near height of your heart, sit quietly for 5 min, legs uncrossed, with back support, then take your blood pressure, write it down, keep a log  -Check once a day. Ideally, 2 hours after medications.  -Can bring your cuff to at least one appointment where it can be calibrated to a manual cuff if you are concerned.  -Goal blood pressure is at least under 130/80, ideally under 120/80.  If you think your BP is overall too high, let us know in the office, we can adjust medications, can use [employer] or call the [facility] office: [phone].  -If you feel dizzy, please check your blood pressure.  If your blood pressure is less than 90/60 with dizziness call our office at [phone].    -Continue to monitor blood pressures, heart rates, and daily weights in log provided in office today. Please bring to next appointment to review with provider.     Salt=sodium=sea salt, guidelines say stay under 2,500 mg daily   Get salt smart, start looking at labels, count it up.  Salt is hidden in everything, salad dressing, sauces, cheese, most canned food, any processed meat.

## 2022-05-09 NOTE — PROGRESS NOTES
Chief Complaint   Patient presents with   • Hyperlipidemia     New patient       Subjective     Miky Benavidez is a 51 y.o. male who presents today new cardiology consultation for hyperlipidemia and premature family history of heart disease.  Kindly referred by Dr. Riddle.  In addition, patient is on medical problems of BMI 30-39, Hx Covid PNA (10/2021), and vitamin D deficiency.    Cardiovascular Risk Factors:  1. Smoking status: Denies  2. Type II Diabetes Mellitus: A1C 5.3  3. Hypertension:  Present on exam today  4. Dyslipidemia: present  5. Family history of early Coronary Artery Disease in a first degree relative (Male less than 55 years of age; Female less than 65 years of age): Present.  Father at the age of 52  6.  Obesity and/or Metabolic Syndrome: Present  7. Sedentary lifestyle: Present; physically active on weekends  8. Substance Abuse (ETOH, Caffine, Recreational substances): 5 hour energy when working nights (1-2x/week)  9. Hx of CKD or autoimmune diseases (lupus or RA): denies    Today, patient reports anxiety regarding family history of CAD.  Patient is sedentary at work, but physically active on the weekends and denies chest pain, shortness of breath, palpitations, dizziness/lightheadedness, orthopnea, PND or Edema at rest or with exacerbation of physical activity.  Patient does not check his blood pressure at home.  Blood pressure elevated in office today.  His annual labs are pending completion.      Patient reports stress test in 2019, but stopped the test early due to fatigue.  Reviewing these results, patient was able to obtain 10.1 METS with no ischemic changes.    EKG in office today personally interpreted by me as sinus rhythm    Due to above risk factors for CAD, we will re-check his A1c for further risk reduction and obtain coronary CTA or MPI if CTA is unavailable.  In addition to his aspirin and rosuvastatin, will add metoprolol with close follow-up for blood pressure optimization.   Patient agreeable with plan of care and questions answered.      Past Medical History:   Diagnosis Date   • Hepatic steatosis    • Hypogonadism in male    • Vitamin D deficiency      Past Surgical History:   Procedure Laterality Date   • ERCP W/ INSERTION STENT/TUBE N/A 11/17/2016    Procedure: ERCP W/ INSERTION STENT/TUBE - FOR STENT PLACEMENT/REMOVAL;  Surgeon: Andriy Somers M.D.;  Location: [facility];  Service:    • ERCP W/REMOVAL CALCULUS N/A 11/17/2016    Procedure: ERCP W/REMOVAL CALCULUS;  Surgeon: Andriy Somers M.D.;  Location: [facility];  Service:    • ERCP IN OR  10/7/2016    Procedure: Endoscopic retrogade cholangiopancreatography;  Surgeon: Andriy Somers M.D.;  Location: [facility];  Service:    • SANDI BY LAPAROSCOPY  10/6/2016    Procedure: SANDI BY LAPAROSCOPY - WITH INTRAOPERATIVE CHOLANGIOGRAMS;  Surgeon: Keaton Parisi M.D.;  Location: [facility];  Service:    • LUMBAR LAMINECTOMY DISKECTOMY  8/5/2013    Performed by Escobar Capps M.D. at SURGERY [facility]   • TONSILLECTOMY       Family History   Problem Relation Age of Onset   • Diabetes Mother    • Diabetes Father    • Heart Disease Father      Social History     Socioeconomic History   • Marital status:      Spouse name: Not on file   • Number of children: Not on file   • Years of education: Not on file   • Highest education level: Not on file   Occupational History   • Not on file   Tobacco Use   • Smoking status: Never Smoker   • Smokeless tobacco: Never Used   Vaping Use   • Vaping Use: Never used   Substance and Sexual Activity   • Alcohol use: Not Currently     Comment: occ   • Drug use: No   • Sexual activity: Yes     Partners: Female   Other Topics Concern   • Not on file   Social History Narrative   • Not on file     Social Determinants of Health     Financial Resource Strain: Not on file   Food Insecurity: Not on file   Transportation Needs: Not on file  "  Physical Activity: Not on file   Stress: Not on file   Social Connections: Not on file   Intimate Partner Violence: Not on file   Housing Stability: Not on file     No Known Allergies  Outpatient Encounter Medications as of 5/9/2022   Medication Sig Dispense Refill   • metoprolol SR (TOPROL XL) 25 MG TABLET SR 24 HR Take 0.5 Tablets by mouth every day. 45 Tablet 3   • rosuvastatin (CRESTOR) 10 MG Tab Take 1 Tablet by mouth every evening. 90 Tablet 2   • aspirin EC (ECOTRIN) 81 MG Tablet Delayed Response Take 1 Tablet by mouth every day. 90 Tablet 2     No facility-administered encounter medications on file as of 5/9/2022.     Review of Systems   Constitutional: Negative for fever, malaise/fatigue and weight loss.   Eyes: Negative for blurred vision.   Respiratory: Negative for cough and shortness of breath.    Cardiovascular: Negative for chest pain, palpitations, orthopnea, claudication, leg swelling and PND.   Gastrointestinal: Negative for abdominal pain, blood in stool, nausea and vomiting.   Genitourinary: Negative for dysuria, frequency and hematuria.   Musculoskeletal: Negative for falls and myalgias.   Neurological: Negative for dizziness, tingling and loss of consciousness.   Endo/Heme/Allergies: Does not bruise/bleed easily.   Psychiatric/Behavioral: The patient is nervous/anxious.               Objective     /94 (BP Location: Left arm, Patient Position: Sitting)   Pulse 84   Resp 16   Ht 1.854 m (6' 1\")   Wt 125 kg (275 lb)   SpO2 97%   BMI 36.28 kg/m²     Physical Exam  Vitals reviewed.   Constitutional:       Appearance: He is well-developed.   HENT:      Head: Normocephalic and atraumatic.   Eyes:      Pupils: Pupils are equal, round, and reactive to light.   Neck:      Vascular: No JVD.   Cardiovascular:      Rate and Rhythm: Normal rate and regular rhythm.      Heart sounds: Normal heart sounds. No murmur heard.    No friction rub. No gallop.   Pulmonary:      Effort: Pulmonary effort " is normal. No respiratory distress.      Breath sounds: Normal breath sounds.   Abdominal:      General: Bowel sounds are normal. There is no distension.      Palpations: Abdomen is soft.   Musculoskeletal:      Right lower leg: No edema.      Left lower leg: No edema.   Skin:     General: Skin is warm and dry.      Findings: No erythema.   Neurological:      General: No focal deficit present.      Mental Status: He is alert and oriented to person, place, and time. Mental status is at baseline.   Psychiatric:         Behavior: Behavior normal.            Lab Results   Component Value Date/Time    SODIUM 141 10/31/2020 08:30 AM    POTASSIUM 4.5 10/31/2020 08:30 AM    CHLORIDE 105 10/31/2020 08:30 AM    CO2 27 10/31/2020 08:30 AM    GLUCOSE 89 10/31/2020 08:30 AM    BUN 11 10/31/2020 08:30 AM    CREATININE 1.29 10/31/2020 08:30 AM     Lab Results   Component Value Date/Time    WBC 10.1 11/11/2016 04:46 PM    RBC 5.00 11/11/2016 04:46 PM    HEMOGLOBIN 14.8 11/11/2016 04:46 PM    HEMATOCRIT 44.5 11/11/2016 04:46 PM    MCV 89.0 11/11/2016 04:46 PM    MCH 29.6 11/11/2016 04:46 PM    MCHC 33.3 (L) 11/11/2016 04:46 PM    MPV 9.9 11/11/2016 04:46 PM    NEUTSPOLYS 77.50 (H) 10/11/2016 12:13 AM    LYMPHOCYTES 12.40 (L) 10/11/2016 12:13 AM    MONOCYTES 7.60 10/11/2016 12:13 AM    EOSINOPHILS 1.70 10/11/2016 12:13 AM    BASOPHILS 0.30 10/11/2016 12:13 AM    HYPOCHROMIA 1+ 09/30/2013 08:13 AM      No results found for: BNPBTYPENAT   No results found for: CRPCARDIAC  Lab Results   Component Value Date/Time    CHOLSTRLTOT 162 10/31/2020 08:30 AM     (H) 10/31/2020 08:30 AM    HDL 32 (A) 10/31/2020 08:30 AM    TRIGLYCERIDE 93 10/31/2020 08:30 AM       Lab Results   Component Value Date/Time    PROTHROMBTM 12.6 08/04/2013 12:50 AM    INR 0.92 08/04/2013 12:50 AM     Lab Results   Component Value Date/Time    TROPONINI <0.01 10/05/2016 03:00 PM      Treadmill Stress (1/29/2019): Provider conclusions and analysis:Baseline  ECG:Normal ECG   Stress ECG: No ischemic T wave changes with peak stress   Impression: Normal stress ECG     Assessment & Plan     1. Family history of heart attack  EKG    HEMOGLOBIN A1C (Glycohemoglobin GHB Total/A1C with MBG Estimate)    CT-CTA HEART W/3D IMAGE    metoprolol SR (TOPROL XL) 25 MG TABLET SR 24 HR    CT-CTA HEART W/3D IMAGE   2. SOB (shortness of breath)  HEMOGLOBIN A1C (Glycohemoglobin GHB Total/A1C with MBG Estimate)    CT-CTA HEART W/3D IMAGE    metoprolol SR (TOPROL XL) 25 MG TABLET SR 24 HR    CT-CTA HEART W/3D IMAGE   3. Family history of heart disease  HEMOGLOBIN A1C (Glycohemoglobin GHB Total/A1C with MBG Estimate)    CT-CTA HEART W/3D IMAGE    metoprolol SR (TOPROL XL) 25 MG TABLET SR 24 HR    CT-CTA HEART W/3D IMAGE   4. Obesity (BMI 30-39.9)  HEMOGLOBIN A1C (Glycohemoglobin GHB Total/A1C with MBG Estimate)    CT-CTA HEART W/3D IMAGE    metoprolol SR (TOPROL XL) 25 MG TABLET SR 24 HR    CT-CTA HEART W/3D IMAGE   5. Mixed hyperlipidemia  CT-CTA HEART W/3D IMAGE    metoprolol SR (TOPROL XL) 25 MG TABLET SR 24 HR    CT-CTA HEART W/3D IMAGE   6. Abnormal stress test  CT-CTA HEART W/3D IMAGE    metoprolol SR (TOPROL XL) 25 MG TABLET SR 24 HR    CT-CTA HEART W/3D IMAGE       Medical Decision Making: Today's Assessment/Status/Plan:        Hyperlipidemia; premature family history CAD; pre-DM; BMI 30-39; hypertension  -Blood pressure elevated in office today.  Written instructions regarding completing blood pressure log and providing to office at next visit.  -Negative treadmill stress test in 2019  -Continue aspirin and rosuvastatin  -Add metoprolol XL 12.5 mg daily  -Coronary CTA  -Check A1c, lipid panel and CMP per PCP    FU in clinic in 2 months to follow-up regarding diagnostic results. Sooner if needed.    Patient verbalizes understanding and agrees with the plan of care.     CANDELARIO Stern.   [facility]  [phone]    PLEASE NOTE: This  Note was created using voice recognition Software. I have made every reasonable attempt to correct obvious errors, but I expect that there are errors of grammar and possibly content that I did not discover before finalizing the note

## 2022-06-04 ASSESSMENT — ENCOUNTER SYMPTOMS
BLURRED VISION: 0
WEIGHT LOSS: 0
DIZZINESS: 0
NAUSEA: 0
TINGLING: 0
MYALGIAS: 0
BLOOD IN STOOL: 0
PALPITATIONS: 0
PND: 0
VOMITING: 0
FEVER: 0
ABDOMINAL PAIN: 0
COUGH: 0
ORTHOPNEA: 0
BRUISES/BLEEDS EASILY: 0
CLAUDICATION: 0
SHORTNESS OF BREATH: 0
LOSS OF CONSCIOUSNESS: 0
FALLS: 0
NERVOUS/ANXIOUS: 1

## 2022-09-12 ENCOUNTER — HOSPITAL ENCOUNTER (OUTPATIENT)
Dept: RADIOLOGY | Facility: MEDICAL CENTER | Age: 52
End: 2022-09-12
Attending: NURSE PRACTITIONER
Payer: COMMERCIAL

## 2022-09-13 ENCOUNTER — HOSPITAL ENCOUNTER (OUTPATIENT)
Dept: LAB | Facility: MEDICAL CENTER | Age: 52
End: 2022-09-13
Attending: NURSE PRACTITIONER
Payer: COMMERCIAL

## 2022-09-13 DIAGNOSIS — Z82.49 FAMILY HISTORY OF HEART ATTACK: ICD-10-CM

## 2022-09-13 DIAGNOSIS — R06.02 SHORTNESS OF BREATH: ICD-10-CM

## 2022-09-13 DIAGNOSIS — E55.9 VITAMIN D DEFICIENCY: ICD-10-CM

## 2022-09-13 DIAGNOSIS — E66.9 OBESITY (BMI 30-39.9): ICD-10-CM

## 2022-09-13 DIAGNOSIS — E78.2 MIXED HYPERLIPIDEMIA: ICD-10-CM

## 2022-09-13 DIAGNOSIS — Z82.49 FAMILY HISTORY OF HEART DISEASE: ICD-10-CM

## 2022-09-13 LAB
25(OH)D3 SERPL-MCNC: 28 NG/ML (ref 30–100)
ALBUMIN SERPL BCP-MCNC: 4.3 G/DL (ref 3.2–4.9)
ALBUMIN/GLOB SERPL: 1.7 G/DL
ALP SERPL-CCNC: 72 U/L (ref 30–99)
ALT SERPL-CCNC: 18 U/L (ref 2–50)
ANION GAP SERPL CALC-SCNC: 9 MMOL/L (ref 7–16)
AST SERPL-CCNC: 21 U/L (ref 12–45)
BILIRUB SERPL-MCNC: 0.5 MG/DL (ref 0.1–1.5)
BUN SERPL-MCNC: 17 MG/DL (ref 8–22)
CALCIUM SERPL-MCNC: 9.1 MG/DL (ref 8.5–10.5)
CHLORIDE SERPL-SCNC: 106 MMOL/L (ref 96–112)
CHOLEST SERPL-MCNC: 171 MG/DL (ref 100–199)
CO2 SERPL-SCNC: 25 MMOL/L (ref 20–33)
CREAT SERPL-MCNC: 1.14 MG/DL (ref 0.5–1.4)
EST. AVERAGE GLUCOSE BLD GHB EST-MCNC: 105 MG/DL
FASTING STATUS PATIENT QL REPORTED: NORMAL
GFR SERPLBLD CREATININE-BSD FMLA CKD-EPI: 78 ML/MIN/1.73 M 2
GLOBULIN SER CALC-MCNC: 2.5 G/DL (ref 1.9–3.5)
GLUCOSE SERPL-MCNC: 98 MG/DL (ref 65–99)
HBA1C MFR BLD: 5.3 % (ref 4–5.6)
HDLC SERPL-MCNC: 37 MG/DL
LDLC SERPL CALC-MCNC: 106 MG/DL
POTASSIUM SERPL-SCNC: 4.4 MMOL/L (ref 3.6–5.5)
PROT SERPL-MCNC: 6.8 G/DL (ref 6–8.2)
SODIUM SERPL-SCNC: 140 MMOL/L (ref 135–145)
TRIGL SERPL-MCNC: 141 MG/DL (ref 0–149)

## 2022-09-13 PROCEDURE — 83036 HEMOGLOBIN GLYCOSYLATED A1C: CPT

## 2022-09-13 PROCEDURE — 80061 LIPID PANEL: CPT

## 2022-09-13 PROCEDURE — 36415 COLL VENOUS BLD VENIPUNCTURE: CPT

## 2022-09-13 PROCEDURE — 80053 COMPREHEN METABOLIC PANEL: CPT

## 2022-09-13 PROCEDURE — 82306 VITAMIN D 25 HYDROXY: CPT

## 2022-10-14 DIAGNOSIS — E34.9 HYPOTESTOSTERONISM: ICD-10-CM

## 2022-10-24 ENCOUNTER — HOSPITAL ENCOUNTER (OUTPATIENT)
Dept: RADIOLOGY | Facility: MEDICAL CENTER | Age: 52
End: 2022-10-24
Attending: NURSE PRACTITIONER
Payer: COMMERCIAL

## 2022-10-24 DIAGNOSIS — Z82.49 FAMILY HISTORY OF HEART ATTACK: ICD-10-CM

## 2022-10-24 DIAGNOSIS — R94.39 ABNORMAL STRESS TEST: ICD-10-CM

## 2022-10-24 DIAGNOSIS — Z82.49 FAMILY HISTORY OF HEART DISEASE: ICD-10-CM

## 2022-10-24 DIAGNOSIS — E66.9 OBESITY (BMI 30-39.9): ICD-10-CM

## 2022-10-24 DIAGNOSIS — E78.2 MIXED HYPERLIPIDEMIA: ICD-10-CM

## 2022-10-24 DIAGNOSIS — R06.02 SHORTNESS OF BREATH: ICD-10-CM

## 2022-10-24 PROCEDURE — 700102 HCHG RX REV CODE 250 W/ 637 OVERRIDE(OP): Performed by: RADIOLOGY

## 2022-10-24 PROCEDURE — A9270 NON-COVERED ITEM OR SERVICE: HCPCS | Performed by: RADIOLOGY

## 2022-10-24 PROCEDURE — 700117 HCHG RX CONTRAST REV CODE 255: Performed by: NURSE PRACTITIONER

## 2022-10-24 PROCEDURE — 75574 CT ANGIO HRT W/3D IMAGE: CPT

## 2022-10-24 RX ORDER — NITROGLYCERIN 0.4 MG/1
0.4 TABLET SUBLINGUAL ONCE
Status: COMPLETED | OUTPATIENT
Start: 2022-10-24 | End: 2022-10-24

## 2022-10-24 RX ADMIN — IOHEXOL 60 ML: 350 INJECTION, SOLUTION INTRAVENOUS at 09:30

## 2022-10-24 RX ADMIN — NITROGLYCERIN 0.4 MG: 0.4 TABLET, ORALLY DISINTEGRATING SUBLINGUAL at 08:31

## 2022-10-24 NOTE — PROGRESS NOTES
Patient had CCTA.  Patient brought to preparation room.   Verbal consent given by patient for PIV and administration of Nitroglycerin by RN.  PIV initiated, 20G right AC.    Review of medications, protocol, and NPO status completed.   Education provided to patient regarding examination.  Patient given baseline 3 lead EKG:   Vitals: 0800  BP: 131/86  HR: SR 66  RR: 16   96% RA     Patient ready for CT scan  Vitals: 0820  BP: 126/96  HR: SR 68  RR: 14  96% RA    Administration of Sublingual Nitroglycerin given per CCTA protocol at 0831--See MAR  Vitals: 0831  BP: 117/74  HR: SR 60  RR: 12  97% RA    Vitals: 0840  BP: 116/72  HR: SR 69  RR: 18  94% RA    Patient returned to preparation area where post procedure education given. PIV removed, patient provided with water.     Vitals returned to baseline. Patient states they are ready to go home. Discharge education provided. Discharged per protocol.     Patient is ambulatory, escorted by RN to [facility].

## 2022-11-01 ENCOUNTER — HOSPITAL ENCOUNTER (OUTPATIENT)
Dept: LAB | Facility: MEDICAL CENTER | Age: 52
End: 2022-11-01
Attending: FAMILY MEDICINE
Payer: COMMERCIAL

## 2022-11-01 DIAGNOSIS — E34.9 HYPOTESTOSTERONISM: ICD-10-CM

## 2022-11-01 LAB — TESTOST SERPL-MCNC: 250 NG/DL (ref 175–781)

## 2022-11-01 PROCEDURE — 84403 ASSAY OF TOTAL TESTOSTERONE: CPT

## 2022-11-01 PROCEDURE — 36415 COLL VENOUS BLD VENIPUNCTURE: CPT

## 2022-11-07 ENCOUNTER — OFFICE VISIT (OUTPATIENT)
Dept: ENDOCRINOLOGY | Facility: MEDICAL CENTER | Age: 52
DRG: 645 | End: 2022-11-07
Payer: COMMERCIAL

## 2022-11-07 ENCOUNTER — OFFICE VISIT (OUTPATIENT)
Dept: CARDIOLOGY | Facility: MEDICAL CENTER | Age: 52
End: 2022-11-07
Payer: COMMERCIAL

## 2022-11-07 VITALS
WEIGHT: 284 LBS | BODY MASS INDEX: 37.64 KG/M2 | DIASTOLIC BLOOD PRESSURE: 80 MMHG | HEART RATE: 80 BPM | SYSTOLIC BLOOD PRESSURE: 118 MMHG | HEIGHT: 73 IN | OXYGEN SATURATION: 95 %

## 2022-11-07 VITALS
WEIGHT: 285 LBS | RESPIRATION RATE: 18 BRPM | OXYGEN SATURATION: 95 % | HEIGHT: 73 IN | HEART RATE: 79 BPM | SYSTOLIC BLOOD PRESSURE: 120 MMHG | BODY MASS INDEX: 37.77 KG/M2 | DIASTOLIC BLOOD PRESSURE: 84 MMHG

## 2022-11-07 DIAGNOSIS — E55.9 VITAMIN D DEFICIENCY: ICD-10-CM

## 2022-11-07 DIAGNOSIS — E78.49 OTHER HYPERLIPIDEMIA: ICD-10-CM

## 2022-11-07 DIAGNOSIS — R73.03 PREDIABETES: ICD-10-CM

## 2022-11-07 DIAGNOSIS — Z82.49 FAMILY HISTORY OF HEART ATTACK: ICD-10-CM

## 2022-11-07 DIAGNOSIS — Z82.49 FAMILY HISTORY OF HEART DISEASE: ICD-10-CM

## 2022-11-07 DIAGNOSIS — R06.02 SHORTNESS OF BREATH: ICD-10-CM

## 2022-11-07 DIAGNOSIS — E66.9 OBESITY (BMI 30-39.9): ICD-10-CM

## 2022-11-07 DIAGNOSIS — E29.1 HYPOGONADISM IN MALE: ICD-10-CM

## 2022-11-07 DIAGNOSIS — E78.2 MIXED HYPERLIPIDEMIA: ICD-10-CM

## 2022-11-07 DIAGNOSIS — E66.9 OBESITY (BMI 35.0-39.9 WITHOUT COMORBIDITY): ICD-10-CM

## 2022-11-07 DIAGNOSIS — R94.39 ABNORMAL STRESS TEST: ICD-10-CM

## 2022-11-07 PROCEDURE — 99214 OFFICE O/P EST MOD 30 MIN: CPT

## 2022-11-07 PROCEDURE — 99212 OFFICE O/P EST SF 10 MIN: CPT

## 2022-11-07 PROCEDURE — 99214 OFFICE O/P EST MOD 30 MIN: CPT | Performed by: NURSE PRACTITIONER

## 2022-11-07 PROCEDURE — RXMED WILLOW AMBULATORY MEDICATION CHARGE

## 2022-11-07 RX ORDER — TIRZEPATIDE 5 MG/.5ML
5 INJECTION, SOLUTION SUBCUTANEOUS
Qty: 6 ML | Refills: 4 | Status: SHIPPED | OUTPATIENT
Start: 2022-11-07 | End: 2023-01-24

## 2022-11-07 RX ORDER — METOPROLOL SUCCINATE 25 MG/1
12.5 TABLET, EXTENDED RELEASE ORAL DAILY
Qty: 45 TABLET | Refills: 3 | Status: SHIPPED | OUTPATIENT
Start: 2022-11-07 | End: 2023-08-11

## 2022-11-07 RX ORDER — CHOLECALCIFEROL (VITAMIN D3) 50 MCG
2000 TABLET ORAL DAILY
COMMUNITY

## 2022-11-07 RX ORDER — ROSUVASTATIN CALCIUM 10 MG/1
10 TABLET, COATED ORAL EVERY EVENING
Qty: 90 TABLET | Refills: 3 | Status: SHIPPED | OUTPATIENT
Start: 2022-11-07 | End: 2022-11-13 | Stop reason: SDUPTHER

## 2022-11-07 RX ORDER — TIRZEPATIDE 5 MG/.5ML
5 INJECTION, SOLUTION SUBCUTANEOUS
Qty: 6 ML | Refills: 4 | Status: SHIPPED | OUTPATIENT
Start: 2022-11-07 | End: 2022-11-07

## 2022-11-07 RX ORDER — TESTOSTERONE CYPIONATE 200 MG/ML
200 INJECTION, SOLUTION INTRAMUSCULAR ONCE
Qty: 1.96 ML | Status: CANCELLED | OUTPATIENT
Start: 2022-11-07 | End: 2022-11-07

## 2022-11-07 ASSESSMENT — ENCOUNTER SYMPTOMS
FALLS: 0
COUGH: 0
BLURRED VISION: 0
BRUISES/BLEEDS EASILY: 0
LOSS OF CONSCIOUSNESS: 0
FEVER: 0
VOMITING: 0
DIZZINESS: 0
WEIGHT LOSS: 0
PND: 0
ABDOMINAL PAIN: 0
BLOOD IN STOOL: 0
NAUSEA: 0
TINGLING: 0
MYALGIAS: 0
CLAUDICATION: 0
SHORTNESS OF BREATH: 0
ORTHOPNEA: 0
PALPITATIONS: 0

## 2022-11-07 NOTE — PROGRESS NOTES
Chief Complaint   Patient presents with    Hyperlipidemia     F/V Dx: Mixed hyperlipidemia      Shortness of Breath       Subjective     Miky Benavidez is a 51 y.o. male who presents today for cardiac follow-up regarding hyperlipidemia and premature family history of heart disease.  Kindly referred by Dr. Riddle.  In addition, patient is on medical problems of BMI 30-39, Hx Covid PNA (10/2021), and vitamin D deficiency.    Cardiovascular Risk Factors:  1. Smoking status: Denies  2. Type II Diabetes Mellitus: A1C 5.3  3. Hypertension:  Present on exam today  4. Dyslipidemia: present  5. Family history of early Coronary Artery Disease in a first degree relative (Male less than 55 years of age; Female less than 65 years of age): Present.  Father at the age of 52  6.  Obesity and/or Metabolic Syndrome: Present  7. Sedentary lifestyle: Present; physically active on weekends  8. Substance Abuse (ETOH, Caffine, Recreational substances): 5 hour energy when working nights (1-2x/week)  9. Hx of CKD or autoimmune diseases (lupus or RA): denies    Today, Patient feels well, denies chest pain, shortness of breath, palpitations, dizziness/lightheadedness, orthopnea, PND or Edema.  Patient reports tolerating medications well.  Blood pressure well controlled in office today.  Patient has been increasing his physical activity at home.  We discussed his coronary CTA results and recommend continuing medications as previously prescribed as tolerated.  Medications refilled.  We will have patient follow-up as needed in cardiology office.  We discussed ER precautions or symptoms requiring sooner follow-up.  Patient verbalizes understanding.          Past Medical History:   Diagnosis Date    Hepatic steatosis     Hypogonadism in male     Vitamin D deficiency      Past Surgical History:   Procedure Laterality Date    ERCP W/ INSERTION STENT/TUBE N/A 11/17/2016    Procedure: ERCP W/ INSERTION STENT/TUBE - FOR STENT PLACEMENT/REMOVAL;   Surgeon: Andriy Somers M.D.;  Location: SURGERY [facility];  Service:     ERCP W/REMOVAL CALCULUS N/A 11/17/2016    Procedure: ERCP W/REMOVAL CALCULUS;  Surgeon: Andriy Somers M.D.;  Location: SURGERY [facility];  Service:     ERCP IN OR  10/7/2016    Procedure: Endoscopic retrogade cholangiopancreatography;  Surgeon: Andriy Somers M.D.;  Location: SURGERY [facility];  Service:     SANDI BY LAPAROSCOPY  10/6/2016    Procedure: SANDI BY LAPAROSCOPY - WITH INTRAOPERATIVE CHOLANGIOGRAMS;  Surgeon: Keaton Parisi M.D.;  Location: SURGERY [facility];  Service:     LUMBAR LAMINECTOMY DISKECTOMY  8/5/2013    Performed by Escobar Capps M.D. at SURGERY [facility]    TONSILLECTOMY       Family History   Problem Relation Age of Onset    Diabetes Mother     Diabetes Father     Heart Disease Father      Social History     Socioeconomic History    Marital status:      Spouse name: Not on file    Number of children: Not on file    Years of education: Not on file    Highest education level: Not on file   Occupational History    Not on file   Tobacco Use    Smoking status: Never    Smokeless tobacco: Never   Vaping Use    Vaping Use: Never used   Substance and Sexual Activity    Alcohol use: Not Currently     Comment: occ    Drug use: No    Sexual activity: Yes     Partners: Female   Other Topics Concern    Not on file   Social History Narrative    Not on file     Social Determinants of Health     Financial Resource Strain: Not on file   Food Insecurity: Not on file   Transportation Needs: Not on file   Physical Activity: Not on file   Stress: Not on file   Social Connections: Not on file   Intimate Partner Violence: Not on file   Housing Stability: Not on file     No Known Allergies  Outpatient Encounter Medications as of 11/7/2022   Medication Sig Dispense Refill    aspirin EC (ECOTRIN) 81 MG Tablet Delayed Response Take 1 Tablet by mouth every day. 90 Tablet 3    metoprolol SR (TOPROL  "XL) 25 MG TABLET SR 24 HR Take 0.5 Tablets by mouth every day. 45 Tablet 3    rosuvastatin (CRESTOR) 10 MG Tab Take 1 Tablet by mouth every evening. 90 Tablet 3    [DISCONTINUED] metoprolol SR (TOPROL XL) 25 MG TABLET SR 24 HR Take 0.5 Tablets by mouth every day. 45 Tablet 3    [DISCONTINUED] rosuvastatin (CRESTOR) 10 MG Tab Take 1 Tablet by mouth every evening. 90 Tablet 2    [DISCONTINUED] aspirin EC (ECOTRIN) 81 MG Tablet Delayed Response Take 1 Tablet by mouth every day. 90 Tablet 2     No facility-administered encounter medications on file as of 11/7/2022.     Review of Systems   Constitutional:  Negative for fever, malaise/fatigue and weight loss.   Eyes:  Negative for blurred vision.   Respiratory:  Negative for cough and shortness of breath.    Cardiovascular:  Negative for chest pain, palpitations, orthopnea, claudication, leg swelling and PND.   Gastrointestinal:  Negative for abdominal pain, blood in stool, nausea and vomiting.   Genitourinary:  Negative for dysuria, frequency and hematuria.   Musculoskeletal:  Negative for falls and myalgias.   Neurological:  Negative for dizziness, tingling and loss of consciousness.   Endo/Heme/Allergies:  Does not bruise/bleed easily.            Objective     /84 (BP Location: Left arm, Patient Position: Sitting, BP Cuff Size: Adult)   Pulse 79   Resp 18   Ht 1.854 m (6' 1\")   Wt (!) 129 kg (285 lb)   SpO2 95%   BMI 37.60 kg/m²     Physical Exam  Vitals reviewed.   Constitutional:       Appearance: He is well-developed.   HENT:      Head: Normocephalic and atraumatic.   Eyes:      Pupils: Pupils are equal, round, and reactive to light.   Neck:      Vascular: No JVD.   Cardiovascular:      Rate and Rhythm: Normal rate and regular rhythm.      Heart sounds: Normal heart sounds. No murmur heard.    No friction rub. No gallop.   Pulmonary:      Effort: Pulmonary effort is normal. No respiratory distress.      Breath sounds: Normal breath sounds.   Abdominal: "      General: Bowel sounds are normal. There is no distension.      Palpations: Abdomen is soft.   Musculoskeletal:      Right lower leg: No edema.      Left lower leg: No edema.   Skin:     General: Skin is warm and dry.      Findings: No erythema.   Neurological:      Mental Status: He is alert and oriented to person, place, and time.   Psychiatric:         Behavior: Behavior normal.          Lab Results   Component Value Date/Time    SODIUM 140 09/13/2022 09:32 AM    POTASSIUM 4.4 09/13/2022 09:32 AM    CHLORIDE 106 09/13/2022 09:32 AM    CO2 25 09/13/2022 09:32 AM    GLUCOSE 98 09/13/2022 09:32 AM    BUN 17 09/13/2022 09:32 AM    CREATININE 1.14 09/13/2022 09:32 AM     Lab Results   Component Value Date/Time    WBC 10.1 11/11/2016 04:46 PM    RBC 5.00 11/11/2016 04:46 PM    HEMOGLOBIN 14.8 11/11/2016 04:46 PM    HEMATOCRIT 44.5 11/11/2016 04:46 PM    MCV 89.0 11/11/2016 04:46 PM    MCH 29.6 11/11/2016 04:46 PM    MCHC 33.3 (L) 11/11/2016 04:46 PM    MPV 9.9 11/11/2016 04:46 PM    NEUTSPOLYS 77.50 (H) 10/11/2016 12:13 AM    LYMPHOCYTES 12.40 (L) 10/11/2016 12:13 AM    MONOCYTES 7.60 10/11/2016 12:13 AM    EOSINOPHILS 1.70 10/11/2016 12:13 AM    BASOPHILS 0.30 10/11/2016 12:13 AM    HYPOCHROMIA 1+ 09/30/2013 08:13 AM      No results found for: BNPBTYPENAT   No results found for: CRPCARDIAC  Lab Results   Component Value Date/Time    CHOLSTRLTOT 171 09/13/2022 09:32 AM     (H) 09/13/2022 09:32 AM    HDL 37 (A) 09/13/2022 09:32 AM    TRIGLYCERIDE 141 09/13/2022 09:32 AM       Lab Results   Component Value Date/Time    PROTHROMBTM 12.6 08/04/2013 12:50 AM    INR 0.92 08/04/2013 12:50 AM     Lab Results   Component Value Date/Time    TROPONINI <0.01 10/05/2016 03:00 PM      Treadmill Stress (1/29/2019): Provider conclusions and analysis:Baseline ECG:Normal ECG   Stress ECG: No ischemic T wave changes with peak stress   Impression: Normal stress ECG     Coronary CT heart (10/24/2022):  FINDINGS:  Limitations: No  significant bolus or motion limitations are present.  Coronary calcification:  LMA - 18.1  LCX - 0.0  LAD - 0.0  RCA - 0.0  Total Calcium Score: 18.1  Percentile: Calcium score is above the 50th percentile for the patient's age and sex.  Coronary CTA:  Dominance: Right dominant circulation.     Left Main: Large caliber vessel with a normal takeoff from the left coronary cusp that bifurcates to form a left anterior descending artery and a left circumflex artery. Small amount of calcified plaque distally without significant luminal reduction.     LAD and Diagonals: Patent. Small amount of noncalcified plaque at the midportion near the takeoff of 2nd diagonal, with less than 50% luminal reduction. Ramus intermedius is patent with no evidence of plaque or stenosis.     LCX and Obtuse Marginals: Patent. No plaque or stenosis.     RCA, Posterior Descending and Posterolateral Branches: Patent. No plaque or stenosis.     Cardiac Structure and Morphology:  Left atrium: Normal in size. No thrombus in the left atrial appendage.  Left ventricle: Normal in size. No stigmata of prior infarction. No abnormal filling defect.  Pericardium: Normal thickness. No pericardial effusion or calcification.  Cardiac valves: No thickening or calcifications in the aortic or mitral valves.  Aorta: No aneurysm of the visualized thoracic aorta.  3D angiographic/MIP images of the vasculature confirm the vascular findings as described above.  Extracardiac findings:  Lungs: Apparent pleural thickening at the RIGHT lung apex posteriorly, partially visualized.  Punctate calcified nodules in the LEFT lower lobe and lingula consistent with granulomas.  Mediastinum: No lymphadenopathy.  Upper abdomen: Elevated LEFT hemidiaphragm.  Bones: Unremarkable.     IMPRESSION:     1.  Small noncalcified plaque in the distal LEFT main coronary artery without significant luminal reduction.  2.  Small amount of noncalcified plaque in the mid LAD without significant  stenosis.  3.  No other evidence for significant coronary artery stenosis or occlusion.  4.  Apparent focal pleural thickening versus less likely pleural-based mass at the RIGHT lung apex posteriorly, partially visualized.  Consider follow-up CT chest.     Calcium Score of 1-99 AND <75th percentile    Assessment & Plan     1. Family history of heart attack  metoprolol SR (TOPROL XL) 25 MG TABLET SR 24 HR      2. SOB (shortness of breath)  metoprolol SR (TOPROL XL) 25 MG TABLET SR 24 HR      3. Family history of heart disease  metoprolol SR (TOPROL XL) 25 MG TABLET SR 24 HR      4. Obesity (BMI 30-39.9)  metoprolol SR (TOPROL XL) 25 MG TABLET SR 24 HR      5. Mixed hyperlipidemia  metoprolol SR (TOPROL XL) 25 MG TABLET SR 24 HR    rosuvastatin (CRESTOR) 10 MG Tab      6. Abnormal stress test  metoprolol SR (TOPROL XL) 25 MG TABLET SR 24 HR          Medical Decision Making: Today's Assessment/Status/Plan:        Nonobstructive CAD; hyperlipidemia; premature family history CAD; pre-DM; BMI 30-39; hypertension  -Blood pressure well controlled in office today  -Mild atherosclerosis on coronary CTA  -Continue aspirin and rosuvastatin as tolerated  -Continue metoprolol XL 12.5 mg daily as tolerated  -.  Acceptable with CTA results.    Medications refilled. FU in clinic as needed.    Patient verbalizes understanding and agrees with the plan of care.     CANDELARIO Stern.   [facility]  [phone]    PLEASE NOTE: This Note was created using voice recognition Software. I have made every reasonable attempt to correct obvious errors, but I expect that there are errors of grammar and possibly content that I did not discover before finalizing the note

## 2022-11-08 ENCOUNTER — PHARMACY VISIT (OUTPATIENT)
Dept: PHARMACY | Facility: MEDICAL CENTER | Age: 52
End: 2022-11-08
Payer: COMMERCIAL

## 2022-11-13 DIAGNOSIS — E78.2 MIXED HYPERLIPIDEMIA: ICD-10-CM

## 2022-11-13 RX ORDER — ROSUVASTATIN CALCIUM 20 MG/1
20 TABLET, COATED ORAL EVERY EVENING
Qty: 90 TABLET | Refills: 3 | Status: SHIPPED | OUTPATIENT
Start: 2022-11-13 | End: 2024-01-03 | Stop reason: SDUPTHER

## 2022-12-12 ENCOUNTER — RESEARCH ENCOUNTER (OUTPATIENT)
Dept: CARDIOLOGY | Facility: MEDICAL CENTER | Age: 52
End: 2022-12-12
Payer: COMMERCIAL

## 2022-12-12 ENCOUNTER — HOSPITAL ENCOUNTER (OUTPATIENT)
Facility: MEDICAL CENTER | Age: 52
End: 2022-12-12
Attending: NURSE PRACTITIONER
Payer: COMMERCIAL

## 2022-12-12 DIAGNOSIS — Z00.6 RESEARCH STUDY PATIENT: ICD-10-CM

## 2022-12-12 LAB
ALBUMIN SERPL BCP-MCNC: 4.3 G/DL (ref 3.2–4.9)
ALBUMIN/GLOB SERPL: 1.7 G/DL
ALP SERPL-CCNC: 82 U/L (ref 30–99)
ALT SERPL-CCNC: 17 U/L (ref 2–50)
ANION GAP SERPL CALC-SCNC: 7 MMOL/L (ref 7–16)
AST SERPL-CCNC: 19 U/L (ref 12–45)
BILIRUB SERPL-MCNC: 0.5 MG/DL (ref 0.1–1.5)
BUN SERPL-MCNC: 14 MG/DL (ref 8–22)
CALCIUM SERPL-MCNC: 9.2 MG/DL (ref 8.5–10.5)
CHLORIDE SERPL-SCNC: 107 MMOL/L (ref 96–112)
CHOLEST SERPL-MCNC: 158 MG/DL (ref 100–199)
CO2 SERPL-SCNC: 27 MMOL/L (ref 20–33)
CREAT SERPL-MCNC: 1.03 MG/DL (ref 0.5–1.4)
GFR SERPLBLD CREATININE-BSD FMLA CKD-EPI: 88 ML/MIN/1.73 M 2
GLOBULIN SER CALC-MCNC: 2.5 G/DL (ref 1.9–3.5)
GLUCOSE SERPL-MCNC: 96 MG/DL (ref 65–99)
HDLC SERPL-MCNC: 37 MG/DL
LDLC SERPL CALC-MCNC: 91 MG/DL
POTASSIUM SERPL-SCNC: 4.8 MMOL/L (ref 3.6–5.5)
PROT SERPL-MCNC: 6.8 G/DL (ref 6–8.2)
SODIUM SERPL-SCNC: 141 MMOL/L (ref 135–145)
TRIGL SERPL-MCNC: 149 MG/DL (ref 0–149)

## 2022-12-12 PROCEDURE — 80053 COMPREHEN METABOLIC PANEL: CPT

## 2022-12-12 PROCEDURE — 80061 LIPID PANEL: CPT

## 2022-12-15 NOTE — RESEARCH NOTE
Name: Miky Benavidez   MRN: [id number]  Participant ID:  [id number]  : 1970  Visit Date/Time: 2022 9:00 AM      Study:    [id number] - [facility]   Status Consented/Enrolled (2022)   Active Start Date 22   Participant ID [id number]   Coordinator Michelle Austin; Michelle Lino; Shaista Baca   IRB [id number]   NCT [id number]    Wilder Hollis M.D.     Screening/Consent note:    Participation in the [employer] clinical trial was discussed with Miky today. All aspects of the study purpose and procedures were explained.  He was given ample time to review the consent and all questions were answered to his satisfaction. Patient aware that the clinical trial is voluntary and he may withdraw consent at any time without affecting the level of care they receive.  Subject signed consent without coercion and undue influence and was given a copy of the signed consent. No study-related procedures took place prior to consenting and all assessments were conducted per protocol.    Historical CCTA done 10/24/2022 will be submitted to study Central reader to see if images are good enough to use for baseline imaging and if meets study I/E criteria.     LDL-C calculated using the traditional Friedewald equation with a value of 91.2     ConMeds and Med Hx reviewed. Currently on stable max statin so patient can skip statin optimization period of study.    Vitals:  BP sitting  133/79  HR 65    Labs:  Drawn and processed per protocol                Recent Results (from the past 336 hour(s))   Comp Metabolic Panel    Collection Time: 22  9:47 AM   Result Value Ref Range    Sodium 141 135 - 145 mmol/L    Potassium 4.8 3.6 - 5.5 mmol/L    Chloride 107 96 - 112 mmol/L    Co2 27 20 - 33 mmol/L    Anion Gap 7.0 7.0 - 16.0    Glucose 96 65 - 99 mg/dL    Bun 14 8 - 22 mg/dL    Creatinine 1.03 0.50 - 1.40 mg/dL    Calcium 9.2 8.5 - 10.5 mg/dL    AST(SGOT) 19 12 - 45 U/L     ALT(SGPT) 17 2 - 50 U/L    Alkaline Phosphatase 82 30 - 99 U/L    Total Bilirubin 0.5 0.1 - 1.5 mg/dL    Albumin 4.3 3.2 - 4.9 g/dL    Total Protein 6.8 6.0 - 8.2 g/dL    Globulin 2.5 1.9 - 3.5 g/dL    A-G Ratio 1.7 g/dL   Lipid Profile    Collection Time: 12/12/22  9:47 AM   Result Value Ref Range    Cholesterol,Tot 158 100 - 199 mg/dL    Triglycerides 149 0 - 149 mg/dL    HDL 37 (A) >=40 mg/dL    LDL 91 <100 mg/dL   ESTIMATED GFR    Collection Time: 12/12/22  9:47 AM   Result Value Ref Range    GFR (CKD-EPI) 88 >60 mL/min/1.73 m 2         Meds:    Current Outpatient Medications   Medication Sig Dispense Refill    rosuvastatin (CRESTOR) 20 MG Tab Take 1 Tablet by mouth every evening. 90 Tablet 3    aspirin EC (ECOTRIN) 81 MG Tablet Delayed Response Take 1 Tablet by mouth every day. 90 Tablet 3    metoprolol SR (TOPROL XL) 25 MG TABLET SR 24 HR Take 0.5 Tablets by mouth every day. 45 Tablet 3    Cholecalciferol (VITAMIN D) 2000 UNIT Tab Take 1 Tablet by mouth every day.      Tirzepatide (MOUNJARO) 5 MG/0.5ML Solution Pen-injector Inject 5 mg under the skin every 7 days. 6 mL 4     No current facility-administered medications for this visit.    current meds    History:    Past Medical History:   Diagnosis Date    Hepatic steatosis     Hypogonadism in male     Vitamin D deficiency        Past Surgical History:   Procedure Laterality Date    ERCP W/ INSERTION STENT/TUBE N/A 11/17/2016    Procedure: ERCP W/ INSERTION STENT/TUBE - FOR STENT PLACEMENT/REMOVAL;  Surgeon: Andriy Somers M.D.;  Location: [facility];  Service:     ERCP W/REMOVAL CALCULUS N/A 11/17/2016    Procedure: ERCP W/REMOVAL CALCULUS;  Surgeon: Andriy Somers M.D.;  Location: [facility];  Service:     ERCP IN OR  10/7/2016    Procedure: Endoscopic retrogade cholangiopancreatography;  Surgeon: Andriy Somers M.D.;  Location: [facility];  Service:     SANDI BY LAPAROSCOPY  10/6/2016    Procedure: SANDI BY  LAPAROSCOPY - WITH INTRAOPERATIVE CHOLANGIOGRAMS;  Surgeon: Keaton Parisi M.D.;  Location: SURGERY [facility];  Service:     LUMBAR LAMINECTOMY DISKECTOMY  2013    Performed by Escobar Capps M.D. at SURGERY [facility]    TONSILLECTOMY          Social History     Tobacco Use    Smoking status: Never    Smokeless tobacco: Never   Vaping Use    Vaping Use: Never used   Substance Use Topics    Alcohol use: Not Currently     Comment: occ    Drug use: No       Family History   Problem Relation Age of Onset    Diabetes Mother     Diabetes Father     Heart Disease Father          Procedures:     EKG:   Results for orders placed or performed in visit on 22   EKG   Result Value Ref Range    Report       [facility]    Test Date:  2022  Pt Name:    [facility]                 Department: [facility]  MRN:        [id number]                      Room:  Gender:     Male                         Technician: JESUS  :        1970                   Requested By:DINORAH MILLS  Order #:    [id number]                    Reading MD: Thelma Patel    Measurements  Intervals                                Axis  Rate:       77                           P:          59  FL:         172                          QRS:        20  QRSD:       109                          T:          58  QT:         384  QTc:        435    Interpretive Statements  SINUS RHYTHM  Electronically Signed On 2022 17:35:20 PDT by Thelma Patel             Follow-up: Baseline Visit TBD

## 2022-12-22 ENCOUNTER — RESEARCH ENCOUNTER (OUTPATIENT)
Dept: CARDIOLOGY | Facility: MEDICAL CENTER | Age: 52
End: 2022-12-22
Payer: COMMERCIAL

## 2022-12-22 DIAGNOSIS — Z00.6 RESEARCH STUDY PATIENT: ICD-10-CM

## 2022-12-22 DIAGNOSIS — Z82.49 FAMILY HISTORY OF HEART DISEASE: ICD-10-CM

## 2022-12-22 DIAGNOSIS — Z82.49 FAMILY HISTORY OF HEART ATTACK: ICD-10-CM

## 2022-12-22 DIAGNOSIS — E78.2 MIXED HYPERLIPIDEMIA: ICD-10-CM

## 2022-12-22 DIAGNOSIS — R94.39 ABNORMAL STRESS TEST: ICD-10-CM

## 2023-01-17 ENCOUNTER — RESEARCH ENCOUNTER (OUTPATIENT)
Dept: CARDIOLOGY | Facility: MEDICAL CENTER | Age: 53
End: 2023-01-17
Payer: COMMERCIAL

## 2023-01-19 NOTE — RESEARCH NOTE
Do to the Trauma CT scanner being broken, we were not able to complete Miky's CCTA scan within the study required screening date window. He will be listed as a screen fail as of 1/09/23. We will explore re-consenting and re-screening him once the CT scanner is operational.

## 2023-01-24 ENCOUNTER — OFFICE VISIT (OUTPATIENT)
Dept: MEDICAL GROUP | Facility: PHYSICIAN GROUP | Age: 53
End: 2023-01-24
Payer: COMMERCIAL

## 2023-01-24 VITALS
BODY MASS INDEX: 36.31 KG/M2 | SYSTOLIC BLOOD PRESSURE: 118 MMHG | WEIGHT: 274 LBS | OXYGEN SATURATION: 97 % | HEART RATE: 68 BPM | RESPIRATION RATE: 16 BRPM | TEMPERATURE: 97.6 F | HEIGHT: 73 IN | DIASTOLIC BLOOD PRESSURE: 86 MMHG

## 2023-01-24 DIAGNOSIS — Z12.12 SCREENING FOR COLORECTAL CANCER: ICD-10-CM

## 2023-01-24 DIAGNOSIS — E78.2 MIXED HYPERLIPIDEMIA: ICD-10-CM

## 2023-01-24 DIAGNOSIS — E55.9 VITAMIN D DEFICIENCY: ICD-10-CM

## 2023-01-24 DIAGNOSIS — Z23 NEED FOR VACCINATION: ICD-10-CM

## 2023-01-24 DIAGNOSIS — Z82.49 FAMILY HISTORY OF HEART DISEASE: ICD-10-CM

## 2023-01-24 DIAGNOSIS — N52.9 ERECTILE DYSFUNCTION, UNSPECIFIED ERECTILE DYSFUNCTION TYPE: ICD-10-CM

## 2023-01-24 DIAGNOSIS — Z12.11 SCREENING FOR COLORECTAL CANCER: ICD-10-CM

## 2023-01-24 PROCEDURE — 99214 OFFICE O/P EST MOD 30 MIN: CPT | Mod: 25 | Performed by: FAMILY MEDICINE

## 2023-01-24 PROCEDURE — 90746 HEPB VACCINE 3 DOSE ADULT IM: CPT | Performed by: FAMILY MEDICINE

## 2023-01-24 PROCEDURE — 90471 IMMUNIZATION ADMIN: CPT | Performed by: FAMILY MEDICINE

## 2023-01-24 RX ORDER — SILDENAFIL 100 MG/1
100 TABLET, FILM COATED ORAL PRN
Qty: 10 TABLET | Refills: 3 | Status: SHIPPED | OUTPATIENT
Start: 2023-01-24 | End: 2023-10-17

## 2023-01-24 NOTE — PROGRESS NOTES
"Subjective:   Miky Benavidez is a 52 y.o. male here today for evaluation and management of:     Family history of heart disease  Reassuring heart CT scan  Continues on asa, rosuvastatin 20 mg  He does not smoke, drinks very rarely when camping.     Erectile dysfunction  rx for viaga requested  Not filled last time due to insurance not covering  New rx provided.                Current medicines (including changes today)  Current Outpatient Medications   Medication Sig Dispense Refill    sildenafil citrate (VIAGRA) 100 MG tablet Take 1 Tablet by mouth as needed for Erectile Dysfunction. 10 Tablet 3    rosuvastatin (CRESTOR) 20 MG Tab Take 1 Tablet by mouth every evening. 90 Tablet 3    aspirin EC (ECOTRIN) 81 MG Tablet Delayed Response Take 1 Tablet by mouth every day. 90 Tablet 3    metoprolol SR (TOPROL XL) 25 MG TABLET SR 24 HR Take 0.5 Tablets by mouth every day. 45 Tablet 3    Cholecalciferol (VITAMIN D) 2000 UNIT Tab Take 1 Tablet by mouth every day.       No current facility-administered medications for this visit.     He  has a past medical history of Hepatic steatosis, Hypogonadism in male, and Vitamin D deficiency.    ROS  No chest pain, no shortness of breath, no abdominal pain       Objective:     /86   Pulse 68   Temp 36.4 °C (97.6 °F) (Temporal)   Resp 16   Ht 1.854 m (6' 1\")   Wt 124 kg (274 lb)   SpO2 97%  Body mass index is 36.15 kg/m².   Physical Exam:  Constitutional: Alert, no distress.  Skin: Warm, dry, good turgor, no rashes in visible areas.  Eye: Equal, round and reactive, conjunctiva clear, lids normal.  ENMT: Lips without lesions, good dentition, oropharynx clear.  Neck: Trachea midline, no masses, no thyromegaly. No cervical or supraclavicular lymphadenopathy  Respiratory: Unlabored respiratory effort, lungs clear to auscultation, no wheezes, no ronchi.  Cardiovascular: Normal S1, S2, no murmur, no edema.  Abdomen: Soft, non-tender, no masses, no hepatosplenomegaly.  Psych: " Alert and oriented x3, normal affect and mood.        Assessment and Plan:   The following treatment plan was discussed    1. Screening for colorectal cancer  - Referral to GI for Colonoscopy    2. Need for vaccination  - Hepatitis B Vaccine Adult 20+    3. Mixed hyperlipidemia  - Comp Metabolic Panel; Future  - Lipid Profile; Future    4. Vitamin D deficiency  - VITAMIN D,25 HYDROXY (DEFICIENCY); Future    5. Family history of heart disease    6. Erectile dysfunction, unspecified erectile dysfunction type    Other orders  - sildenafil citrate (VIAGRA) 100 MG tablet; Take 1 Tablet by mouth as needed for Erectile Dysfunction.  Dispense: 10 Tablet; Refill: 3      Followup: Return in about 1 year (around 1/24/2024) for Lab Review.

## 2023-01-24 NOTE — ASSESSMENT & PLAN NOTE
Reassuring heart CT scan  Continues on asa, rosuvastatin 20 mg  He does not smoke, drinks very rarely when camping.

## 2023-01-24 NOTE — PATIENT INSTRUCTIONS
Please get fasting labs, fasting for 8-10 hours before next visit. You can make an appointment for the lab or walk in.   Lab hours [facility] location: Monday to Friday 6 am - 4 pm, Saturday 7 am -noon  Even if you lose your lab paperwork, you can still come in to get your lab done.     Please check [email or web address]) for your colonoscopy referral information or call the referral department at .   You can also send me a [employer] message regarding your referral information.   Please note you will probably not get a call regarding the referral.

## 2023-02-17 DIAGNOSIS — E66.9 OBESITY (BMI 35.0-39.9 WITHOUT COMORBIDITY): ICD-10-CM

## 2023-02-17 DIAGNOSIS — R73.03 PREDIABETES: ICD-10-CM

## 2023-02-17 RX ORDER — TIRZEPATIDE 5 MG/.5ML
5 INJECTION, SOLUTION SUBCUTANEOUS
Qty: 6 ML | Refills: 4 | Status: SHIPPED | OUTPATIENT
Start: 2023-02-17 | End: 2023-03-06

## 2023-03-06 ENCOUNTER — RESEARCH ENCOUNTER (OUTPATIENT)
Dept: CARDIOLOGY | Facility: MEDICAL CENTER | Age: 53
End: 2023-03-06

## 2023-03-06 ENCOUNTER — OFFICE VISIT (OUTPATIENT)
Dept: CARDIOLOGY | Facility: MEDICAL CENTER | Age: 53
End: 2023-03-06
Payer: COMMERCIAL

## 2023-03-06 ENCOUNTER — HOSPITAL ENCOUNTER (OUTPATIENT)
Dept: LAB | Facility: MEDICAL CENTER | Age: 53
End: 2023-03-06
Payer: COMMERCIAL

## 2023-03-06 ENCOUNTER — HOSPITAL ENCOUNTER (OUTPATIENT)
Dept: LAB | Facility: MEDICAL CENTER | Age: 53
End: 2023-03-06
Attending: FAMILY MEDICINE
Payer: COMMERCIAL

## 2023-03-06 VITALS
RESPIRATION RATE: 17 BRPM | HEIGHT: 73 IN | SYSTOLIC BLOOD PRESSURE: 102 MMHG | DIASTOLIC BLOOD PRESSURE: 78 MMHG | OXYGEN SATURATION: 96 % | BODY MASS INDEX: 37.19 KG/M2 | HEART RATE: 88 BPM | WEIGHT: 280.6 LBS

## 2023-03-06 DIAGNOSIS — Z00.6 RESEARCH STUDY PATIENT: ICD-10-CM

## 2023-03-06 DIAGNOSIS — E66.9 OBESITY (BMI 30-39.9): ICD-10-CM

## 2023-03-06 DIAGNOSIS — E78.2 MIXED HYPERLIPIDEMIA: ICD-10-CM

## 2023-03-06 DIAGNOSIS — E29.1 HYPOGONADISM IN MALE: ICD-10-CM

## 2023-03-06 DIAGNOSIS — Z82.49 FAMILY HISTORY OF HEART DISEASE: ICD-10-CM

## 2023-03-06 DIAGNOSIS — Z82.49 FAMILY HISTORY OF HEART ATTACK: ICD-10-CM

## 2023-03-06 DIAGNOSIS — N52.9 ERECTILE DYSFUNCTION, UNSPECIFIED ERECTILE DYSFUNCTION TYPE: ICD-10-CM

## 2023-03-06 DIAGNOSIS — E55.9 VITAMIN D DEFICIENCY: ICD-10-CM

## 2023-03-06 LAB
25(OH)D3 SERPL-MCNC: 31 NG/ML (ref 30–100)
ALBUMIN SERPL BCP-MCNC: 4.3 G/DL (ref 3.2–4.9)
ALBUMIN/GLOB SERPL: 1.6 G/DL
ALP SERPL-CCNC: 70 U/L (ref 30–99)
ALT SERPL-CCNC: 18 U/L (ref 2–50)
ANION GAP SERPL CALC-SCNC: 11 MMOL/L (ref 7–16)
AST SERPL-CCNC: 19 U/L (ref 12–45)
BILIRUB SERPL-MCNC: 0.5 MG/DL (ref 0.1–1.5)
BUN SERPL-MCNC: 15 MG/DL (ref 8–22)
CALCIUM ALBUM COR SERPL-MCNC: 9 MG/DL (ref 8.5–10.5)
CALCIUM SERPL-MCNC: 9.2 MG/DL (ref 8.5–10.5)
CHLORIDE SERPL-SCNC: 104 MMOL/L (ref 96–112)
CHOLEST SERPL-MCNC: 147 MG/DL (ref 100–199)
CO2 SERPL-SCNC: 26 MMOL/L (ref 20–33)
CREAT SERPL-MCNC: 1.22 MG/DL (ref 0.5–1.4)
FASTING STATUS PATIENT QL REPORTED: NORMAL
GFR SERPLBLD CREATININE-BSD FMLA CKD-EPI: 72 ML/MIN/1.73 M 2
GLOBULIN SER CALC-MCNC: 2.7 G/DL (ref 1.9–3.5)
GLUCOSE SERPL-MCNC: 111 MG/DL (ref 65–99)
HCT VFR BLD AUTO: 47.8 % (ref 42–52)
HDLC SERPL-MCNC: 37 MG/DL
HGB BLD-MCNC: 15.9 G/DL (ref 14–18)
LDLC SERPL CALC-MCNC: 81 MG/DL
POTASSIUM SERPL-SCNC: 4.5 MMOL/L (ref 3.6–5.5)
PROT SERPL-MCNC: 7 G/DL (ref 6–8.2)
SODIUM SERPL-SCNC: 141 MMOL/L (ref 135–145)
TRIGL SERPL-MCNC: 145 MG/DL (ref 0–149)

## 2023-03-06 PROCEDURE — 80053 COMPREHEN METABOLIC PANEL: CPT

## 2023-03-06 PROCEDURE — 36415 COLL VENOUS BLD VENIPUNCTURE: CPT

## 2023-03-06 PROCEDURE — 84403 ASSAY OF TOTAL TESTOSTERONE: CPT

## 2023-03-06 PROCEDURE — 80061 LIPID PANEL: CPT

## 2023-03-06 PROCEDURE — 85014 HEMATOCRIT: CPT

## 2023-03-06 PROCEDURE — 99213 OFFICE O/P EST LOW 20 MIN: CPT | Performed by: NURSE PRACTITIONER

## 2023-03-06 PROCEDURE — 84270 ASSAY OF SEX HORMONE GLOBUL: CPT

## 2023-03-06 PROCEDURE — 85018 HEMOGLOBIN: CPT

## 2023-03-06 PROCEDURE — 84402 ASSAY OF FREE TESTOSTERONE: CPT

## 2023-03-06 PROCEDURE — 82306 VITAMIN D 25 HYDROXY: CPT

## 2023-03-06 RX ORDER — NITROGLYCERIN 0.4 MG/1
0.4 TABLET SUBLINGUAL ONCE
Status: COMPLETED | OUTPATIENT
Start: 2023-03-21 | End: 2023-03-21

## 2023-03-06 ASSESSMENT — ENCOUNTER SYMPTOMS
ORTHOPNEA: 0
FEVER: 0
PND: 0
PALPITATIONS: 0
DIZZINESS: 0
MYALGIAS: 0
LOSS OF CONSCIOUSNESS: 0
SHORTNESS OF BREATH: 0
CLAUDICATION: 0
FALLS: 0
WEIGHT LOSS: 0
NAUSEA: 0
BRUISES/BLEEDS EASILY: 0
ABDOMINAL PAIN: 0
BLOOD IN STOOL: 0
BLURRED VISION: 0
COUGH: 0
TINGLING: 0
VOMITING: 0

## 2023-03-06 NOTE — PROGRESS NOTES
Chief Complaint   Patient presents with    Shortness of Breath     F/V DX: SOB (shortness of breath)     Follow-Up     F/V DX: Family history of heart attack        Subjective     Miky Benavidez is a 51 y.o. male who presents today for cardiac follow-up regarding hyperlipidemia and premature family history of heart disease.  Kindly referred by Dr. Riddle.  In addition, patient is on medical problems of BMI 30-39, Hx Covid PNA (10/2021), and vitamin D deficiency.    Cardiovascular Risk Factors:  1. Smoking status: Denies  2. Type II Diabetes Mellitus: A1C 5.3  3. Hypertension:  Present on exam today  4. Dyslipidemia: present  5. Family history of early Coronary Artery Disease in a first degree relative (Male less than 55 years of age; Female less than 65 years of age): Present.  Father at the age of 52  6.  Obesity and/or Metabolic Syndrome: Present  7. Sedentary lifestyle: Present; physically active on weekends  8. Substance Abuse (ETOH, Caffine, Recreational substances): Hx high caffeine use, 5 hour energy when working nights (1-2x/week)  9. Hx of CKD or autoimmune diseases (lupus or RA): denies    Since patient was last seen, screen for primary prevention research trial which patient agreeable to participate.  Initiation was deferred due to technical difficulties with CT scanning.  Today, patient feels well, denies chest pain, shortness of breath, palpitations, dizziness/lightheadedness, orthopnea, PND, or edema.  Patient's been able to tolerate occupation.  Patient just got a new puppy.  We reviewed premedications prior to coronary CT with nitroglycerin tablet 0.4 mg and additional beta-blocker dose 30 minutes prior.  Patient verbalizes understanding.  We will have patient follow-up with general cardiology in a year, otherwise intervals recommended per trial protocol.      Past Medical History:   Diagnosis Date    Hepatic steatosis     Hypogonadism in male     Vitamin D deficiency      Past Surgical History:    Procedure Laterality Date    ERCP W/ INSERTION STENT/TUBE N/A 11/17/2016    Procedure: ERCP W/ INSERTION STENT/TUBE - FOR STENT PLACEMENT/REMOVAL;  Surgeon: Andriy Somers M.D.;  Location: [facility];  Service:     ERCP W/REMOVAL CALCULUS N/A 11/17/2016    Procedure: ERCP W/REMOVAL CALCULUS;  Surgeon: Andriy Somers M.D.;  Location: SURGERY [facility];  Service:     ERCP IN OR  10/7/2016    Procedure: Endoscopic retrogade cholangiopancreatography;  Surgeon: Andriy Somers M.D.;  Location: SUR[facility]ERY [facility];  Service:     SANDI BY LAPAROSCOPY  10/6/2016    Procedure: SANDI BY LAPAROSCOPY - WITH INTRAOPERATIVE CHOLANGIOGRAMS;  Surgeon: Keaton Parisi M.D.;  Location: SURGERY [facility];  Service:     LUMBAR LAMINECTOMY DISKECTOMY  8/5/2013    Performed by Escobar Capps M.D. at SURGERY [facility]    TONSILLECTOMY       Family History   Problem Relation Age of Onset    Diabetes Mother     Diabetes Father     Heart Disease Father      Social History     Socioeconomic History    Marital status:      Spouse name: Not on file    Number of children: Not on file    Years of education: Not on file    Highest education level: Not on file   Occupational History    Not on file   Tobacco Use    Smoking status: Never    Smokeless tobacco: Never   Vaping Use    Vaping Use: Never used   Substance and Sexual Activity    Alcohol use: Not Currently     Comment: occ    Drug use: No    Sexual activity: Yes     Partners: Female   Other Topics Concern    Not on file   Social History Narrative    Not on file     Social Determinants of Health     Financial Resource Strain: Not on file   Food Insecurity: Not on file   Transportation Needs: Not on file   Physical Activity: Not on file   Stress: Not on file   Social Connections: Not on file   Intimate Partner Violence: Not on file   Housing Stability: Not on file     No Known Allergies  Outpatient Encounter Medications as of 3/6/2023  "  Medication Sig Dispense Refill    sildenafil citrate (VIAGRA) 100 MG tablet Take 1 Tablet by mouth as needed for Erectile Dysfunction. 10 Tablet 3    rosuvastatin (CRESTOR) 20 MG Tab Take 1 Tablet by mouth every evening. 90 Tablet 3    aspirin EC (ECOTRIN) 81 MG Tablet Delayed Response Take 1 Tablet by mouth every day. 90 Tablet 3    metoprolol SR (TOPROL XL) 25 MG TABLET SR 24 HR Take 0.5 Tablets by mouth every day. 45 Tablet 3    Cholecalciferol (VITAMIN D) 2000 UNIT Tab Take 1 Tablet by mouth every day.      [DISCONTINUED] Tirzepatide (MOUNJARO) 5 MG/0.5ML Solution Pen-injector Inject 5 mg under the skin every 7 days. 6 mL 4     Facility-Administered Encounter Medications as of 3/6/2023   Medication Dose Route Frequency Provider Last Rate Last Admin    [START ON 3/21/2023] nitroglycerin (NITROSTAT) tablet 0.4 mg  0.4 mg Sublingual Once Aileen Biswas, A.P.R.N.         Review of Systems   Constitutional:  Negative for fever, malaise/fatigue and weight loss.   Eyes:  Negative for blurred vision.   Respiratory:  Negative for cough and shortness of breath.    Cardiovascular:  Negative for chest pain, palpitations, orthopnea, claudication, leg swelling and PND.   Gastrointestinal:  Negative for abdominal pain, blood in stool, nausea and vomiting.   Genitourinary:  Negative for dysuria, frequency and hematuria.   Musculoskeletal:  Negative for falls and myalgias.   Neurological:  Negative for dizziness, tingling and loss of consciousness.   Endo/Heme/Allergies:  Does not bruise/bleed easily.            Objective     /78 (BP Location: Left arm, Patient Position: Sitting, BP Cuff Size: Adult)   Pulse 88   Resp 17   Ht 1.854 m (6' 1\")   Wt (!) 127 kg (280 lb 9.6 oz)   SpO2 96%   BMI 37.02 kg/m²     Physical Exam  Vitals reviewed.   Constitutional:       Appearance: He is well-developed.   HENT:      Head: Normocephalic and atraumatic.   Eyes:      Pupils: Pupils are equal, round, and reactive to light. "   Neck:      Vascular: No JVD.   Cardiovascular:      Rate and Rhythm: Normal rate and regular rhythm.      Pulses: Normal pulses.      Heart sounds: Normal heart sounds. No murmur heard.    No friction rub. No gallop.   Pulmonary:      Effort: Pulmonary effort is normal. No respiratory distress.      Breath sounds: Normal breath sounds.   Abdominal:      General: Bowel sounds are normal. There is no distension.      Palpations: Abdomen is soft.   Musculoskeletal:      Right lower leg: No edema.      Left lower leg: No edema.   Skin:     General: Skin is warm and dry.      Findings: No erythema.   Neurological:      Mental Status: He is alert and oriented to person, place, and time.   Psychiatric:         Behavior: Behavior normal.          Lab Results   Component Value Date/Time    SODIUM 141 12/12/2022 09:47 AM    POTASSIUM 4.8 12/12/2022 09:47 AM    CHLORIDE 107 12/12/2022 09:47 AM    CO2 27 12/12/2022 09:47 AM    GLUCOSE 96 12/12/2022 09:47 AM    BUN 14 12/12/2022 09:47 AM    CREATININE 1.03 12/12/2022 09:47 AM     Lab Results   Component Value Date/Time    WBC 10.1 11/11/2016 04:46 PM    RBC 5.00 11/11/2016 04:46 PM    HEMOGLOBIN 14.8 11/11/2016 04:46 PM    HEMATOCRIT 44.5 11/11/2016 04:46 PM    MCV 89.0 11/11/2016 04:46 PM    MCH 29.6 11/11/2016 04:46 PM    MCHC 33.3 (L) 11/11/2016 04:46 PM    MPV 9.9 11/11/2016 04:46 PM    NEUTSPOLYS 77.50 (H) 10/11/2016 12:13 AM    LYMPHOCYTES 12.40 (L) 10/11/2016 12:13 AM    MONOCYTES 7.60 10/11/2016 12:13 AM    EOSINOPHILS 1.70 10/11/2016 12:13 AM    BASOPHILS 0.30 10/11/2016 12:13 AM    HYPOCHROMIA 1+ 09/30/2013 08:13 AM      No results found for: BNPBTYPENAT   No results found for: CRPCARDIAC  Lab Results   Component Value Date/Time    CHOLSTRLTOT 158 12/12/2022 09:47 AM    LDL 91 12/12/2022 09:47 AM    HDL 37 (A) 12/12/2022 09:47 AM    TRIGLYCERIDE 149 12/12/2022 09:47 AM       Lab Results   Component Value Date/Time    PROTHROMBTM 12.6 08/04/2013 12:50 AM    INR 0.92  08/04/2013 12:50 AM     Lab Results   Component Value Date/Time    TROPONINI <0.01 10/05/2016 03:00 PM      Treadmill Stress (1/29/2019): Provider conclusions and analysis:Baseline ECG:Normal ECG   Stress ECG: No ischemic T wave changes with peak stress   Impression: Normal stress ECG     Coronary CT heart (10/24/2022):  FINDINGS:  Limitations: No significant bolus or motion limitations are present.  Coronary calcification:  LMA - 18.1  LCX - 0.0  LAD - 0.0  RCA - 0.0  Total Calcium Score: 18.1  Percentile: Calcium score is above the 50th percentile for the patient's age and sex.  Coronary CTA:  Dominance: Right dominant circulation.     Left Main: Large caliber vessel with a normal takeoff from the left coronary cusp that bifurcates to form a left anterior descending artery and a left circumflex artery. Small amount of calcified plaque distally without significant luminal reduction.     LAD and Diagonals: Patent. Small amount of noncalcified plaque at the midportion near the takeoff of 2nd diagonal, with less than 50% luminal reduction. Ramus intermedius is patent with no evidence of plaque or stenosis.     LCX and Obtuse Marginals: Patent. No plaque or stenosis.     RCA, Posterior Descending and Posterolateral Branches: Patent. No plaque or stenosis.     Cardiac Structure and Morphology:  Left atrium: Normal in size. No thrombus in the left atrial appendage.  Left ventricle: Normal in size. No stigmata of prior infarction. No abnormal filling defect.  Pericardium: Normal thickness. No pericardial effusion or calcification.  Cardiac valves: No thickening or calcifications in the aortic or mitral valves.  Aorta: No aneurysm of the visualized thoracic aorta.  3D angiographic/MIP images of the vasculature confirm the vascular findings as described above.  Extracardiac findings:  Lungs: Apparent pleural thickening at the RIGHT lung apex posteriorly, partially visualized.  Punctate calcified nodules in the LEFT lower  lobe and lingula consistent with granulomas.  Mediastinum: No lymphadenopathy.  Upper abdomen: Elevated LEFT hemidiaphragm.  Bones: Unremarkable.     IMPRESSION:     1.  Small noncalcified plaque in the distal LEFT main coronary artery without significant luminal reduction.  2.  Small amount of noncalcified plaque in the mid LAD without significant stenosis.  3.  No other evidence for significant coronary artery stenosis or occlusion.  4.  Apparent focal pleural thickening versus less likely pleural-based mass at the RIGHT lung apex posteriorly, partially visualized.  Consider follow-up CT chest.     Calcium Score of 1-99 AND <75th percentile    Assessment & Plan     1. Research study patient  nitroglycerin (NITROSTAT) tablet 0.4 mg      2. Mixed hyperlipidemia  nitroglycerin (NITROSTAT) tablet 0.4 mg      3. Family history of heart attack  nitroglycerin (NITROSTAT) tablet 0.4 mg      4. Family history of heart disease  nitroglycerin (NITROSTAT) tablet 0.4 mg      5. Obesity (BMI 30-39.9)  nitroglycerin (NITROSTAT) tablet 0.4 mg      6. Erectile dysfunction, unspecified erectile dysfunction type            Medical Decision Making: Today's Assessment/Status/Plan:        Nonobstructive CAD; hyperlipidemia; premature family history CAD; pre-DM; BMI 30-39; hypertension; ED  -Blood pressure well controlled in office today  -Mild atherosclerosis on coronary CTA  -Continue aspirin and rosuvastatin as tolerated  -Continue metoprolol XL 12.5 mg daily as tolerated  -LDL 91.    -Patient agreeable to participate in [facility] plaque trial.  Repeat coronary CT per protocol.    Follow-up annually with general cardiology.  Sooner as needed per trial protocol    Patient verbalizes understanding and agrees with the plan of care.     I personally spent a total of 20 minutes which includes face-to-face time and non-face-to-face time spent on preparing to see the patient, reviewing prior notes and tests, obtaining history from the  patient, performing a medically appropriate exam, counseling and educating the patient, ordering medications/tests/procedures/referrals as clinically indicated, collaborating with research coordinatorMichelle, and documenting information in the electronic medical record.    CANDELARIO Stern.   [facility]  [phone]    PLEASE NOTE: This Note was created using voice recognition Software. I have made every reasonable attempt to correct obvious errors, but I expect that there are errors of grammar and possibly content that I did not discover before finalizing the note

## 2023-03-07 ENCOUNTER — TELEMEDICINE (OUTPATIENT)
Dept: ENDOCRINOLOGY | Facility: MEDICAL CENTER | Age: 53
End: 2023-03-07
Payer: COMMERCIAL

## 2023-03-07 DIAGNOSIS — E78.49 OTHER HYPERLIPIDEMIA: ICD-10-CM

## 2023-03-07 DIAGNOSIS — R73.03 PREDIABETES: ICD-10-CM

## 2023-03-07 DIAGNOSIS — E66.9 OBESITY (BMI 35.0-39.9 WITHOUT COMORBIDITY): ICD-10-CM

## 2023-03-07 DIAGNOSIS — E29.1 SECONDARY MALE HYPOGONADISM: ICD-10-CM

## 2023-03-07 DIAGNOSIS — Z79.899 CONTROLLED SUBSTANCE AGREEMENT SIGNED: ICD-10-CM

## 2023-03-07 DIAGNOSIS — Z79.899 HIGH RISK MEDICATION USE: ICD-10-CM

## 2023-03-07 DIAGNOSIS — E55.9 VITAMIN D DEFICIENCY: ICD-10-CM

## 2023-03-07 PROCEDURE — 99214 OFFICE O/P EST MOD 30 MIN: CPT | Mod: 95

## 2023-03-07 RX ORDER — TESTOSTERONE CYPIONATE 200 MG/ML
100 INJECTION, SOLUTION INTRAMUSCULAR
Qty: 6 ML | Refills: 0 | Status: SHIPPED | OUTPATIENT
Start: 2023-03-07 | End: 2023-03-14 | Stop reason: SDUPTHER

## 2023-03-07 RX ORDER — ERGOCALCIFEROL 1.25 MG/1
50000 CAPSULE ORAL
Qty: 12 CAPSULE | Refills: 0 | Status: SHIPPED | OUTPATIENT
Start: 2023-03-07 | End: 2023-10-17

## 2023-03-07 RX ORDER — SYRINGE WITH NEEDLE, 1 ML 25GX5/8"
1 SYRINGE, EMPTY DISPOSABLE MISCELLANEOUS
Qty: 30 EACH | Refills: 11 | Status: SHIPPED | OUTPATIENT
Start: 2023-03-07

## 2023-03-07 RX ORDER — TESTOSTERONE CYPIONATE 200 MG/ML
100 INJECTION, SOLUTION INTRAMUSCULAR ONCE
Qty: 6 ML | Refills: 0 | Status: SHIPPED | OUTPATIENT
Start: 2023-03-07 | End: 2023-03-07

## 2023-03-07 NOTE — PROGRESS NOTES
Chief Complaint: Consult requested by Rosa Riddle M.D. for evaluation of Hypogonadism  Patient was presented for a telehealth consultation via secure and encrypted videoconferencing technology. This encounter was conducted via Zoom . Verbal consent was obtained. Patient's identity was verified.   HPI:   Miky Benavidez is a 52 y.o. male     Secondary Hypogonadism in Male:   Took testosterone for 1 year in 2020 after low testosterone levels in 2019, reports that this was very helpful with his libido, maintaining an erection, energy, concentration, muscle weakness-reports this issues now that he is off the testosterone not as intense but these symptoms present     At this time we are treating diagnoses #2 and #5 prior to beginning taking testosterone-tried and failed Monjaro    Reports needle phobia      Latest Reference Range & Units 11/01/22 08:53   Testosterone,Total 175 - 781 ng/dL 250      Latest Reference Range & Units 07/11/15 07:50 03/27/19 08:24   Testosterone,Total 175 - 781 ng/dL 176 (L) 123 (L)        Latest Reference Range & Units 10/31/20 08:30   Follicle Stimulating Hormone 1.5 - 12.4 mIU/mL <0.3 (L)   Luteinizing Hormone 1.7 - 8.6 IU/L <0.3 (L)   Prolactin 2.10 - 17.70 ng/mL 15.80   Sex Hormone Bind Globulin 11 - 80 nmol/L 28   Testosterone,Total 175 - 781 ng/dL 481   Microsomal -Tpo- Abs 0.0 - 9.0 IU/mL <9.0      Latest Reference Range & Units 10/31/20 08:30   TSH 0.380 - 5.330 uIU/mL 1.460   Free T-4 0.93 - 1.70 ng/dL 1.18   T3 60.0 - 181.0 ng/dL 128.0   T3,Free 2.00 - 4.40 pg/mL 3.56     2.  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  Other hyperlipidemia:  Currently taking Crestor 10 mg daily  Follow-up PCP     Latest Reference Range & Units 09/13/22 09:32   Cholesterol,Tot 100 - 199 mg/dL 171   Triglycerides 0 - 149 mg/dL 141   HDL  >=40 mg/dL 37 !   LDL <100 mg/dL 106 (H)     4.  Vitamin D deficiency:  Currently taking 2000 IUs BID OTC daily vitamin D3   Latest Reference Range & Units 03/06/23 06:46   25-Hydroxy   Vitamin D 25 30 - 100 ng/mL 31      Latest Reference Range & Units 09/13/22 09:32   25-Hydroxy   Vitamin D 25 30 - 100 ng/mL 28 (L)     5.  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  High risk medication use:  On testosterone for hypogonadism treatment    7.  Controlled substance agreement signed:  Will be signed at next appointment    Patient's medications, allergies, and social histories were reviewed and updated as appropriate.    ROS:      CONS:     No fever, no chills, no weight loss, no fatigue   EYES:      No diplopia, no blurry vision, no redness of eyes, no swelling of eyelids   ENT:    No hearing loss, No ear pain, No sore throat, no dysphagia, no neck swelling   CV:     No chest pain, no palpitations, no claudication, no orthopnea, no PND   PULM:    No SOB, no cough, no hemoptysis, no wheezing    GI:   No nausea, no vomiting, no diarrhea, no constipation, no bloody stools   :  Passing urine well, no dysuria, no hematuria   ENDO:   No polyuria, no polydipsia, no heat intolerance, no cold intolerance   NEURO: No headaches, no dizziness, no convulsions, no tremors   MUSC:  No joint swellings, no arthralgias, no myalgias, no weakness   SKIN:   No rash, no ulcers, no dry skin   PSYCH:   No depression, no anxiety, no difficulty sleeping       Past Medical History:  Patient Active Problem List    Diagnosis Date Noted    Mixed hyperlipidemia 05/28/2019    Influenza vaccination declined 11/21/2018    Obesity (BMI 30-39.9) 11/20/2018    Calculus of bile duct without mention of cholecystitis or obstruction  11/17/2016    History of biliary stent insertion 10/24/2016    Common wart 07/17/2015    Hypotestosteronism 07/14/2015    Family history of heart disease 07/09/2015    Erectile dysfunction 07/08/2015    SOB (shortness of breath) 07/08/2015    Vitamin d deficiency 08/05/2013    Intervertebral disc rupture 08/04/2013    Cauda equina compression (HCC) 08/04/2013       Past Surgical History:  Past Surgical History:   Procedure Laterality Date    ERCP W/ INSERTION STENT/TUBE N/A 11/17/2016    Procedure: ERCP W/ INSERTION STENT/TUBE - FOR STENT PLACEMENT/REMOVAL;  Surgeon: Andriy Somers M.D.;  Location: [facility];  Service:     ERCP W/REMOVAL CALCULUS N/A 11/17/2016    Procedure: ERCP W/REMOVAL CALCULUS;  Surgeon: Andriy Somers M.D.;  Location: [facility];  Service:     ERCP IN OR  10/7/2016    Procedure: Endoscopic retrogade cholangiopancreatography;  Surgeon: Andriy Somers M.D.;  Location: SURGERY [facility];  Service:     SANDI BY LAPAROSCOPY  10/6/2016    Procedure: SANDI BY LAPAROSCOPY - WITH INTRAOPERATIVE CHOLANGIOGRAMS;  Surgeon: Keaton Parisi M.D.;  Location: SURGERY [facility];  Service:     LUMBAR LAMINECTOMY DISKECTOMY  8/5/2013    Performed by Escobar Capps M.D. at [facility]    TONSILLECTOMY          Allergies:  Patient has no known allergies.     Current Medications:    Current Outpatient Medications:     sildenafil citrate (VIAGRA) 100 MG tablet, Take 1 Tablet by mouth as needed for Erectile Dysfunction., Disp: 10 Tablet, Rfl: 3    rosuvastatin (CRESTOR) 20 MG Tab, Take 1 Tablet by mouth every evening., Disp: 90 Tablet, Rfl: 3    aspirin EC (ECOTRIN) 81 MG Tablet Delayed Response, Take 1 Tablet by mouth every day., Disp: 90 Tablet, Rfl: 3    metoprolol SR (TOPROL XL) 25 MG TABLET SR 24 HR, Take 0.5 Tablets by mouth every day., Disp: 45 Tablet, Rfl: 3    Cholecalciferol (VITAMIN D) 2000 UNIT Tab, Take 1 Tablet by mouth every day., Disp: , Rfl:      Current Facility-Administered Medications:     [START ON 3/21/2023] nitroglycerin (NITROSTAT) tablet 0.4 mg, 0.4 mg, Sublingual, Once, Aileen Biswas A.P.R.N.    Social History:  Social History     Socioeconomic History    Marital status:      Spouse name: Not on file    Number of children: Not on file    Years of education: Not on file    Highest education level: Not on file   Occupational History    Not on file   Tobacco Use    Smoking status: Never    Smokeless tobacco: Never   Vaping Use    Vaping Use: Never used   Substance and Sexual Activity    Alcohol use: Not Currently     Comment: occ    Drug use: No    Sexual activity: Yes     Partners: Female   Other Topics Concern    Not on file   Social History Narrative    Not on file     Social Determinants of Health     Financial Resource Strain: Not on file   Food Insecurity: Not on file   Transportation Needs: Not on file   Physical Activity: Not on file   Stress: Not on file   Social Connections: Not on file   Intimate Partner Violence: Not on file   Housing Stability: Not on file        Family History:   Family History   Problem Relation Age of Onset    Diabetes Mother     Diabetes Father     Heart Disease Father          PHYSICAL EXAM:   Vital signs:   GENERAL: Well-developed, well-nourished  in no apparent distress.   EYE: No ocular and eyelid asymmetry, Anicteric sclerae,  PERRL, No exophthalmos or lidlag  HENT: Hearing grossly intact, Normocephalic, atraumatic.   NECK: Supple. Trachea midline. thyroid is normal in size without nodules or tenderness  CHEST: Denies breast tenderness  CARDIOVASCULAR: Regular rate and rhythm. No murmurs, rubs, or gallops.   LUNGS: Clear to auscultation bilaterally   GENIT: deferred  EXTREMITIES: No clubbing, cyanosis, or edema.   NEUROLOGICAL: Cranial nerves II-XII are grossly intact   Symmetric reflexes at the patella no proximal muscle weakness, No visible tremor with both outstretched hands  LYMPH: No  "cervical, supraclavicular,  adenopathy palpated.   SKIN: No rashes, lesions. Turgor is normal.    Labs:    Lab Results   Component Value Date/Time    TSHULTRASEN 1.460 10/31/2020 0830     No results found for: FREEDIR  Lab Results   Component Value Date/Time    FREET3 3.56 10/31/2020 0830     No results found for: THYSTIMIG    No results found for: LH    Lab Results   Component Value Date/Time    FSH <0.3 (L) 10/31/2020 0830       Lab Results   Component Value Date/Time    TESTOSTERONE 250 11/01/2022 0853           Imaging:      ASSESSMENT/PLAN:   1. Secondary male hypogonadism  Clinically unstable  Biochemically unstable  More current blood work is still in process  Extensive discussion about hypogonadism, signs and symptoms, treatment methods  Discussed importance of SUGAR and PSA checks yearly by his primary care provider as testosterone can speed up prostate cancer but does not cause prostate cancer  Discussed that testosterone levels must be kept within range to avoid Resolved cardiovascular disease    Testosterone cypionate 100 mg every 2 weeks  Injections for thyroid and injecting sent  Information for IM injections sent to [facility]  Information about testosterone sent to [facility]    - testosterone cypionate (DEPO-TESTOSTERONE) 200 MG/ML Solution injection; Inject 0.5 mL into the shoulder, thigh, or buttocks one time for 1 dose.  Dispense: 6 mL; Refill: 0  - SYRINGE-NEEDLE, DISP, 3 ML (BD LUER-LOCK SYRINGE) 18G X 1-1/2\" 3 ML Misc; 1 Each every 14 days. Use to draw up testosterone  Dispense: 30 Each; Refill: 11  - SYRINGE-NEEDLE, DISP, 3 ML (B-D 3CC LUER-LAKESHA SYR 22GX1\") 22G X 1\" 3 ML Misc; 1 Each every 14 days. Use for testosterone injection  Dispense: 30 Each; Refill: 11  - Testosterone, Free & Total, Adult Male (w/SHBG); Future  - HEMOGLOBIN AND HEMATOCRIT; Future  - PSA TOTAL W/FREE PSA REFLEX; Future  - PROSTATE SPECIFIC AG DIAGNOSTIC; Future  - Comp Metabolic Panel; Future  - TSH; Future  - FREE THYROXINE; " Future    2. Obesity (BMI 35.0-39.9 without comorbidity)  Unstable  Tried and failed 1 Mounjaro  Continue lifestyle modifications    3. Other hyperlipidemia  Unstable  Follow-up PCP    4. Vitamin D deficiency  Unstable  Ergocalciferol 1.25 mg weekly sent to pharmacy  - VITAMIN D,25 HYDROXY (DEFICIENCY); Future  - vitamin D2, Ergocalciferol, (DRISDOL) 1.25 MG (46239 UT) Cap capsule; Take 1 Capsule by mouth every 7 days.  Dispense: 12 Capsule; Refill: 0    5. Prediabetes  Unstable  Continue lifestyle modifications  Tried and failed 1 Mounjaro    6. High risk medication use  On testosterone for hypogonadism treatment    7. Controlled substance agreement signed  Controlled substance agreement will be signed at next appointment-today was a virtual visit    Disposition: Make an appointment to follow-up in 3 months  Do your blood work 1 week prior to next appointment          Thank you kindly for allowing me to participate in the endocrine care plan for this patient.    CALEB [email or web address].[email or web address].  03/07/2023    CC:   Rosa Riddle M.D.

## 2023-03-08 LAB
SHBG SERPL-SCNC: 27 NMOL/L (ref 19–76)
TESTOST FREE MFR SERPL: 2 % (ref 1.6–2.9)
TESTOST FREE SERPL-MCNC: 49 PG/ML (ref 47–244)
TESTOST SERPL-MCNC: 251 NG/DL (ref 300–890)

## 2023-03-08 NOTE — RESEARCH NOTE
"Name: Miky Benavidez   MRN: [id number]  Participant ID:  [id number]  : 1970  Visit Date/Time: 3/6/2023 6:30AM      Study:    [id number] - [facility]   Status Consented/Enrolled (3/6/2023)   Active Start Date 3/6/23   Participant ID [id number]   Coordinator Michelle Austin; Michelle Lino; Shaista Baca   IRB [id number]   NCT [id number]    Wilder Hollis M.D.     Re-Screening/Consent note:     Participation in the [facility] Plaque clinical trial was again discussed with Miky today. He was previously consented in this study as pt #[id number] but had to be \"screen-failed\" prior to randomization due to CCTA scanner being broken. All aspects of the study purpose and procedures were explained.  He was given ample time to review the consent and all questions were answered to his satisfaction. Patient aware that the clinical trial is voluntary and he may withdraw consent at any time without affecting the level of care they receive.  Subject signed all study consents without coercion and undue influence and was given a copy of the signed consents.     He did consent to have optional genetics sample be collected for this study.    No study-related procedures took place prior to consenting and all assessments were conducted per protocol.    Complete Physical Exam conducted by ANGEL Crow. See her note.    ConMeds and Med Hx reviewed. Miky is currently on stable max statin of rouvastatin 20mg qd so he can skip the statin optimization period of study.    Miky declined participation in the [facility] pharmacy card program.     At this point, Miky meets all inclusion and no exclusion criteria with CCTA scheduled for 3/21/23.    Vitals:     BP OMRON reading after sitting for 5 minutes, 1-2 min apart  102/80 pulse 82  110/82 pulse 84  106/78 pulse 82  Mean 106/80 pulse 83     cm   .3 kg  Shoes were removed for HT and WT.    Labs:  drawn after fasting for >10 hours              " "  Recent Results (from the past 336 hour(s))   Lipid Profile    Collection Time: 03/06/23  6:46 AM   Result Value Ref Range    Cholesterol,Tot 147 100 - 199 mg/dL    Triglycerides 145 0 - 149 mg/dL    HDL 37 (A) >=40 mg/dL    LDL 81 <100 mg/dL   Comp Metabolic Panel    Collection Time: 03/06/23  6:46 AM   Result Value Ref Range    Sodium 141 135 - 145 mmol/L    Potassium 4.5 3.6 - 5.5 mmol/L    Chloride 104 96 - 112 mmol/L    Co2 26 20 - 33 mmol/L    Anion Gap 11.0 7.0 - 16.0    Glucose 111 (H) 65 - 99 mg/dL    Bun 15 8 - 22 mg/dL    Creatinine 1.22 0.50 - 1.40 mg/dL    Calcium 9.2 8.5 - 10.5 mg/dL    AST(SGOT) 19 12 - 45 U/L    ALT(SGPT) 18 2 - 50 U/L    Alkaline Phosphatase 70 30 - 99 U/L    Total Bilirubin 0.5 0.1 - 1.5 mg/dL    Albumin 4.3 3.2 - 4.9 g/dL    Total Protein 7.0 6.0 - 8.2 g/dL    Globulin 2.7 1.9 - 3.5 g/dL    A-G Ratio 1.6 g/dL   VITAMIN D,25 HYDROXY (DEFICIENCY)    Collection Time: 03/06/23  6:46 AM   Result Value Ref Range    25-Hydroxy   Vitamin D 25 31 30 - 100 ng/mL   FASTING STATUS    Collection Time: 03/06/23  6:46 AM   Result Value Ref Range    Fasting Status Fasting    Testosterone, Free & Total, Adult Male (w/SHBG)    Collection Time: 03/06/23  6:46 AM   Result Value Ref Range    Testosterone,Total 251 (L) 300 - 890 ng/dL    Sex Hormone Bind Globulin 27 19 - 76 nmol/L    Free Testosterone 49 47 - 244 pg/mL    Testosterone % Free 2.0 1.6 - 2.9 %   HEMOGLOBIN AND HEMATOCRIT    Collection Time: 03/06/23  6:46 AM   Result Value Ref Range    Hemoglobin 15.9 14.0 - 18.0 g/dL    Hematocrit 47.8 42.0 - 52.0 %   CORRECTED CALCIUM    Collection Time: 03/06/23  6:46 AM   Result Value Ref Range    Correct Calcium 9.0 8.5 - 10.5 mg/dL   ESTIMATED GFR    Collection Time: 03/06/23  6:46 AM   Result Value Ref Range    GFR (CKD-EPI) 72 >60 mL/min/1.73 m 2       Meds:      Current Outpatient Medications   Medication Sig Dispense Refill    SYRINGE-NEEDLE, DISP, 3 ML (BD LUER-LOCK SYRINGE) 18G X 1-1/2\" 3 ML " "Misc 1 Each every 14 days. Use to draw up testosterone 30 Each 11    SYRINGE-NEEDLE, DISP, 3 ML (B-D 3CC LUER-LAKESHA SYR 22GX1\") 22G X 1\" 3 ML Misc 1 Each every 14 days. Use for testosterone injection 30 Each 11    vitamin D2, Ergocalciferol, (DRISDOL) 1.25 MG (94367 UT) Cap capsule Take 1 Capsule by mouth every 7 days. 12 Capsule 0    testosterone cypionate (DEPO-TESTOSTERONE) 200 MG/ML Solution injection Inject 0.5 mL into the shoulder, thigh, or buttocks every 14 days for 90 days. 6 mL 0    sildenafil citrate (VIAGRA) 100 MG tablet Take 1 Tablet by mouth as needed for Erectile Dysfunction. 10 Tablet 3    rosuvastatin (CRESTOR) 20 MG Tab Take 1 Tablet by mouth every evening. 90 Tablet 3    aspirin EC (ECOTRIN) 81 MG Tablet Delayed Response Take 1 Tablet by mouth every day. 90 Tablet 3    metoprolol SR (TOPROL XL) 25 MG TABLET SR 24 HR Take 0.5 Tablets by mouth every day. 45 Tablet 3    Cholecalciferol (VITAMIN D) 2000 UNIT Tab Take 1 Tablet by mouth every day.       Current Facility-Administered Medications   Medication Dose Route Frequency Provider Last Rate Last Admin    [START ON 3/21/2023] nitroglycerin (NITROSTAT) tablet 0.4 mg  0.4 mg Sublingual Once INESSA Stern        current meds    History:    Past Medical History:   Diagnosis Date    Hepatic steatosis     Hypogonadism in male     Vitamin D deficiency        Past Surgical History:   Procedure Laterality Date    ERCP W/ INSERTION STENT/TUBE N/A 11/17/2016    Procedure: ERCP W/ INSERTION STENT/TUBE - FOR STENT PLACEMENT/REMOVAL;  Surgeon: Andriy Somers M.D.;  Location: [facility];  Service:     ERCP W/REMOVAL CALCULUS N/A 11/17/2016    Procedure: ERCP W/REMOVAL CALCULUS;  Surgeon: Andriy Somers M.D.;  Location: [facility];  Service:     ERCP IN OR  10/7/2016    Procedure: Endoscopic retrogade cholangiopancreatography;  Surgeon: Andriy Somers M.D.;  Location: [facility];  Service:     SANDI BY " LAPAROSCOPY  10/6/2016    Procedure: SANDI BY LAPAROSCOPY - WITH INTRAOPERATIVE CHOLANGIOGRAMS;  Surgeon: Keaton Parisi M.D.;  Location: SURGERY [facility];  Service:     LUMBAR LAMINECTOMY DISKECTOMY  2013    Performed by Escobar Capps M.D. at SURGERY [facility]    TONSILLECTOMY          Social History     Tobacco Use    Smoking status: Never    Smokeless tobacco: Never   Vaping Use    Vaping Use: Never used   Substance Use Topics    Alcohol use: Not Currently     Comment: occ    Drug use: No       Family History   Problem Relation Age of Onset    Diabetes Mother     Diabetes Father     Heart Disease Father          Procedures:     EKG:   Results for orders placed or performed in visit on 22   EKG   Result Value Ref Range    Report       [facility]    Test Date:  2022  Pt Name:    [facility]                 Department: [facility]  MRN:        [id number]                      Room:  Gender:     Male                         Technician:   :        1970                   Requested By:DINORAH MILLS  Order #:    [id number]                    Reading MD: Thelma Patel    Measurements  Intervals                                Axis  Rate:       77                           P:          59  SC:         172                          QRS:        20  QRSD:       109                          T:          58  QT:         384  QTc:        435    Interpretive Statements  SINUS RHYTHM  Electronically Signed On 2022 17:35:20 PDT by Thelma Patel             Follow-up: CCTA 3/21/23 with Day 1 Randomization to be scheduled 2 weeks later if images are eligible.

## 2023-03-14 DIAGNOSIS — E29.1 SECONDARY MALE HYPOGONADISM: ICD-10-CM

## 2023-03-14 RX ORDER — TESTOSTERONE CYPIONATE 200 MG/ML
100 INJECTION, SOLUTION INTRAMUSCULAR
Qty: 6 ML | Refills: 0 | Status: SHIPPED | OUTPATIENT
Start: 2023-03-14 | End: 2023-06-12

## 2023-03-21 ENCOUNTER — HOSPITAL ENCOUNTER (OUTPATIENT)
Dept: RADIOLOGY | Facility: MEDICAL CENTER | Age: 53
End: 2023-03-21
Attending: NURSE PRACTITIONER

## 2023-03-21 ENCOUNTER — OFFICE VISIT (OUTPATIENT)
Dept: CARDIOLOGY | Facility: MEDICAL CENTER | Age: 53
End: 2023-03-21
Payer: COMMERCIAL

## 2023-03-21 DIAGNOSIS — Z00.6 RESEARCH STUDY PATIENT: ICD-10-CM

## 2023-03-21 DIAGNOSIS — Z82.49 FAMILY HISTORY OF HEART DISEASE: ICD-10-CM

## 2023-03-21 DIAGNOSIS — E78.2 MIXED HYPERLIPIDEMIA: ICD-10-CM

## 2023-03-21 DIAGNOSIS — Z82.49 FAMILY HISTORY OF HEART ATTACK: ICD-10-CM

## 2023-03-21 DIAGNOSIS — R94.39 ABNORMAL STRESS TEST: ICD-10-CM

## 2023-03-21 PROCEDURE — 700117 HCHG RX CONTRAST REV CODE 255: Performed by: NURSE PRACTITIONER

## 2023-03-21 PROCEDURE — A9270 NON-COVERED ITEM OR SERVICE: HCPCS | Performed by: NURSE PRACTITIONER

## 2023-03-21 PROCEDURE — 99999 PR NO CHARGE: CPT | Performed by: NURSE PRACTITIONER

## 2023-03-21 PROCEDURE — 75574 CT ANGIO HRT W/3D IMAGE: CPT

## 2023-03-21 PROCEDURE — 700102 HCHG RX REV CODE 250 W/ 637 OVERRIDE(OP): Performed by: NURSE PRACTITIONER

## 2023-03-21 RX ADMIN — NITROGLYCERIN 0.4 MG: 0.4 TABLET, ORALLY DISINTEGRATING SUBLINGUAL at 09:21

## 2023-03-21 RX ADMIN — IOHEXOL 75 ML: 350 INJECTION, SOLUTION INTRAVENOUS at 16:00

## 2023-03-21 NOTE — PROGRESS NOTES
"No chief complaint on file.  Family History of CAD    Subjective:     HPI:   Miky Benavidez is a 52 y.o. male here to discuss the evaluation and management of: Ashley-Plaque with his wife    ROS  Patient feels well, denies chest pain, shortness of breath, palpitations, dizziness/lightheadedness, orthopnea, PND or Edema.     No Known Allergies    Current medicines (including changes today)  Current Outpatient Medications   Medication Sig Dispense Refill    testosterone cypionate (DEPO-TESTOSTERONE) 200 MG/ML Solution injection Inject 0.5 mL into the shoulder, thigh, or buttocks every 14 days for 90 days. 6 mL 0    SYRINGE-NEEDLE, DISP, 3 ML (BD LUER-LOCK SYRINGE) 18G X 1-1/2\" 3 ML Misc 1 Each every 14 days. Use to draw up testosterone 30 Each 11    SYRINGE-NEEDLE, DISP, 3 ML (B-D 3CC LUER-LAKESHA SYR 22GX1\") 22G X 1\" 3 ML Misc 1 Each every 14 days. Use for testosterone injection 30 Each 11    vitamin D2, Ergocalciferol, (DRISDOL) 1.25 MG (50960 UT) Cap capsule Take 1 Capsule by mouth every 7 days. 12 Capsule 0    sildenafil citrate (VIAGRA) 100 MG tablet Take 1 Tablet by mouth as needed for Erectile Dysfunction. 10 Tablet 3    rosuvastatin (CRESTOR) 20 MG Tab Take 1 Tablet by mouth every evening. 90 Tablet 3    aspirin EC (ECOTRIN) 81 MG Tablet Delayed Response Take 1 Tablet by mouth every day. 90 Tablet 3    metoprolol SR (TOPROL XL) 25 MG TABLET SR 24 HR Take 0.5 Tablets by mouth every day. 45 Tablet 3    Cholecalciferol (VITAMIN D) 2000 UNIT Tab Take 1 Tablet by mouth every day.       No current facility-administered medications for this visit.       Social History     Tobacco Use    Smoking status: Never    Smokeless tobacco: Never   Vaping Use    Vaping Use: Never used   Substance Use Topics    Alcohol use: Not Currently     Comment: occ    Drug use: No       Patient Active Problem List    Diagnosis Date Noted    Cauda equina compression (HCC) 08/04/2013    Vitamin d deficiency 08/05/2013    Mixed " hyperlipidemia 05/28/2019    Influenza vaccination declined 11/21/2018    Obesity (BMI 30-39.9) 11/20/2018    Calculus of bile duct without mention of cholecystitis or obstruction 11/17/2016    History of biliary stent insertion 10/24/2016    Common wart 07/17/2015    Hypotestosteronism 07/14/2015    Family history of heart disease 07/09/2015    Erectile dysfunction 07/08/2015    SOB (shortness of breath) 07/08/2015    Intervertebral disc rupture 08/04/2013       Family History   Problem Relation Age of Onset    Diabetes Mother     Diabetes Father     Heart Disease Father           Objective:     There were no vitals taken for this visit. There is no height or weight on file to calculate BMI.    Physical Exam  Constitutional:       General: He is not in acute distress.  Cardiovascular:      Rate and Rhythm: Normal rate and regular rhythm.      Pulses: Normal pulses.   Pulmonary:      Effort: Pulmonary effort is normal. No respiratory distress.      Breath sounds: Normal breath sounds.   Abdominal:      General: Abdomen is flat.   Musculoskeletal:      Right lower leg: No edema.      Left lower leg: No edema.   Skin:     General: Skin is warm and dry.   Neurological:      General: No focal deficit present.      Mental Status: He is alert and oriented to person, place, and time. Mental status is at baseline.   Psychiatric:         Mood and Affect: Mood normal.         Behavior: Behavior normal.        Lab Results   Component Value Date/Time    CHOLSTRLTOT 147 03/06/2023 06:46 AM    LDL 81 03/06/2023 06:46 AM    HDL 37 (A) 03/06/2023 06:46 AM    TRIGLYCERIDE 145 03/06/2023 06:46 AM       Lab Results   Component Value Date/Time    SODIUM 141 03/06/2023 06:46 AM    POTASSIUM 4.5 03/06/2023 06:46 AM    CHLORIDE 104 03/06/2023 06:46 AM    CO2 26 03/06/2023 06:46 AM    GLUCOSE 111 (H) 03/06/2023 06:46 AM    BUN 15 03/06/2023 06:46 AM    CREATININE 1.22 03/06/2023 06:46 AM     Lab Results   Component Value Date/Time     ALKPHOSPHAT 70 03/06/2023 06:46 AM    ASTSGOT 19 03/06/2023 06:46 AM    ALTSGPT 18 03/06/2023 06:46 AM    TBILIRUBIN 0.5 03/06/2023 06:46 AM      BMP:  Lab Results   Component Value Date/Time    SODIUM 141 03/06/2023 06:46 AM    SODIUM 141 12/12/2022 09:47 AM    SODIUM 140 09/13/2022 09:32 AM        Assessment and Plan:     The following treatment plan was discussed:    1. Research study patient  CT today    2. Family history of heart attack  CT today for [facility] plaque trial    3. Family history of heart disease  Continue  rosuvastatin, ASA 81 mg, metoprolol 12.5    4. Mixed hyperlipidemia  Per above    FU in clinic per study protocol with myself or other investigators. Sooner if needed.    Patient verbalizes understanding and agrees with the plan of care.

## 2023-03-27 ENCOUNTER — RESEARCH ENCOUNTER (OUTPATIENT)
Dept: CARDIOLOGY | Facility: MEDICAL CENTER | Age: 53
End: 2023-03-27
Payer: COMMERCIAL

## 2023-03-29 NOTE — RESEARCH NOTE
"Name: Miky Benavidez   MRN: [id number]  Participant ID:  [id number]  : 1970  Visit Date/Time: 3/27/2023 08:00 AM      Study:    [id number] - [facility]aque   Status Consented/Enrolled (3/6/2023)   Active Start Date 3/6/23   Participant ID [id number]   Coordinator Michelle Austin; Michelle Lino; Shaista Baca   IRB [id number]   NCT [id number]    Wilder Hollis M.D.       Study Note:    Miky here today for 2nd attempt to draw CCTA/Baseline study labs. He reports fasting for >10 hours. Study required central labs were drawn at 8:03am, processed, and shipped today per protocol.     Urine specimen dipstick read by CRC according to \"Multist[employer] 10 SG\" directions at 9:36am. No abnormal results so specimen not sent to Central Lab.  GLU neg, MADDIE neg, KET neg, SG 1.020, Blood neg, pH 6.0, Pro neg, URO normal, NIT neg, PEPE neg.     CCTA done 3/21/323 read by Independent Central Radiologist as meeting study eligibility requirements.       Follow-up: Randomization Visit 4/3/23      "

## 2023-04-03 ENCOUNTER — RESEARCH ENCOUNTER (OUTPATIENT)
Dept: CARDIOLOGY | Facility: MEDICAL CENTER | Age: 53
End: 2023-04-03
Payer: COMMERCIAL

## 2023-04-03 ENCOUNTER — OFFICE VISIT (OUTPATIENT)
Dept: CARDIOLOGY | Facility: MEDICAL CENTER | Age: 53
End: 2023-04-03
Payer: COMMERCIAL

## 2023-04-03 VITALS
DIASTOLIC BLOOD PRESSURE: 80 MMHG | BODY MASS INDEX: 37.64 KG/M2 | HEIGHT: 73 IN | RESPIRATION RATE: 16 BRPM | HEART RATE: 77 BPM | SYSTOLIC BLOOD PRESSURE: 120 MMHG | OXYGEN SATURATION: 96 % | WEIGHT: 284 LBS

## 2023-04-03 DIAGNOSIS — Z82.49 FAMILY HISTORY OF HEART DISEASE: ICD-10-CM

## 2023-04-03 DIAGNOSIS — Z00.6 RESEARCH STUDY PATIENT: ICD-10-CM

## 2023-04-03 DIAGNOSIS — E78.2 MIXED HYPERLIPIDEMIA: ICD-10-CM

## 2023-04-03 PROCEDURE — 99213 OFFICE O/P EST LOW 20 MIN: CPT | Performed by: NURSE PRACTITIONER

## 2023-04-03 ASSESSMENT — ENCOUNTER SYMPTOMS
ORTHOPNEA: 0
PALPITATIONS: 0
SHORTNESS OF BREATH: 0
LOSS OF CONSCIOUSNESS: 0
BLOOD IN STOOL: 0
TINGLING: 0
DIZZINESS: 0
MYALGIAS: 0
COUGH: 0
FEVER: 0
FALLS: 0
BLURRED VISION: 0
ABDOMINAL PAIN: 0
VOMITING: 0
WEIGHT LOSS: 0
PND: 0
BRUISES/BLEEDS EASILY: 0
NAUSEA: 0
CLAUDICATION: 0

## 2023-04-03 NOTE — PROGRESS NOTES
Chief Complaint   Patient presents with    Shortness of Breath    Hyperlipidemia       Subjective     Miky Benavidez is a 51 y.o. male who presents today for cardiac follow-up regarding hyperlipidemia and premature family history of heart disease.  Kindly referred by Dr. Riddle.  In addition, patient is on medical problems of BMI 30-39, Hx Covid PNA (10/2021), and vitamin D deficiency.    Cardiovascular Risk Factors:  1. Smoking status: Denies  2. Type II Diabetes Mellitus: A1C 5.3  3. Hypertension:  Present on exam today  4. Dyslipidemia: present  5. Family history of early Coronary Artery Disease in a first degree relative (Male less than 55 years of age; Female less than 65 years of age): Present.  Father at the age of 52  6.  Obesity and/or Metabolic Syndrome: Present  7. Sedentary lifestyle: Present; physically active on weekends  8. Substance Abuse (ETOH, Caffine, Recreational substances): Hx high caffeine use, 5 hour energy when working nights (1-2x/week)  9. Hx of CKD or autoimmune diseases (lupus or RA): denies    Since patient was last seen, underwent coronary CT.  Today, patient feels well.  He reports blood pressure improved.  He denies chest pain, shortness of breath, palpitations, dizziness/lightheadedness, orthopnea, PND, or edema.  No changes from previous physical exams.    Patient met protocol requirements for coronary CT, he will be randomized today with inclisiran or placebo with follow-up per trial protocol.  Patient to follow-up with general cardiology post trial.      Past Medical History:   Diagnosis Date    Hepatic steatosis     Hypogonadism in male     Vitamin D deficiency      Past Surgical History:   Procedure Laterality Date    ERCP W/ INSERTION STENT/TUBE N/A 11/17/2016    Procedure: ERCP W/ INSERTION STENT/TUBE - FOR STENT PLACEMENT/REMOVAL;  Surgeon: Andriy Somers M.D.;  Location: SURGERY [facility];  Service:     ERCP W/REMOVAL CALCULUS N/A 11/17/2016    Procedure: ERCP  W/REMOVAL CALCULUS;  Surgeon: Andriy Somers M.D.;  Location: SURGERY [facility];  Service:     ERCP IN OR  10/7/2016    Procedure: Endoscopic retrogade cholangiopancreatography;  Surgeon: Andriy Somers M.D.;  Location: SURGERY [facility];  Service:     SANDI BY LAPAROSCOPY  10/6/2016    Procedure: SANDI BY LAPAROSCOPY - WITH INTRAOPERATIVE CHOLANGIOGRAMS;  Surgeon: Keaton Parisi M.D.;  Location: SURGERY [facility];  Service:     LUMBAR LAMINECTOMY DISKECTOMY  8/5/2013    Performed by Escobar Capps M.D. at SURGERY [facility]    TONSILLECTOMY       Family History   Problem Relation Age of Onset    Diabetes Mother     Diabetes Father     Heart Disease Father      Social History     Socioeconomic History    Marital status:      Spouse name: Not on file    Number of children: Not on file    Years of education: Not on file    Highest education level: Not on file   Occupational History    Not on file   Tobacco Use    Smoking status: Never    Smokeless tobacco: Never   Vaping Use    Vaping Use: Never used   Substance and Sexual Activity    Alcohol use: Not Currently     Comment: occ    Drug use: No    Sexual activity: Yes     Partners: Female   Other Topics Concern    Not on file   Social History Narrative    Not on file     Social Determinants of Health     Financial Resource Strain: Not on file   Food Insecurity: Not on file   Transportation Needs: Not on file   Physical Activity: Not on file   Stress: Not on file   Social Connections: Not on file   Intimate Partner Violence: Not on file   Housing Stability: Not on file     No Known Allergies  Outpatient Encounter Medications as of 4/3/2023   Medication Sig Dispense Refill    inclisiran sodium or PLACEBO (STUDY DRUG) 300 mg/1.5 mL injection Inject 1.5 mL under the skin one time for 1 dose. 1.5 mL 0    testosterone cypionate (DEPO-TESTOSTERONE) 200 MG/ML Solution injection Inject 0.5 mL into the shoulder, thigh, or buttocks every 14  "days for 90 days. 6 mL 0    SYRINGE-NEEDLE, DISP, 3 ML (BD LUER-LOCK SYRINGE) 18G X 1-1/2\" 3 ML Misc 1 Each every 14 days. Use to draw up testosterone 30 Each 11    SYRINGE-NEEDLE, DISP, 3 ML (B-D 3CC LUER-LAKESHA SYR 22GX1\") 22G X 1\" 3 ML Misc 1 Each every 14 days. Use for testosterone injection 30 Each 11    vitamin D2, Ergocalciferol, (DRISDOL) 1.25 MG (67361 UT) Cap capsule Take 1 Capsule by mouth every 7 days. 12 Capsule 0    sildenafil citrate (VIAGRA) 100 MG tablet Take 1 Tablet by mouth as needed for Erectile Dysfunction. 10 Tablet 3    rosuvastatin (CRESTOR) 20 MG Tab Take 1 Tablet by mouth every evening. 90 Tablet 3    aspirin EC (ECOTRIN) 81 MG Tablet Delayed Response Take 1 Tablet by mouth every day. 90 Tablet 3    metoprolol SR (TOPROL XL) 25 MG TABLET SR 24 HR Take 0.5 Tablets by mouth every day. 45 Tablet 3    Cholecalciferol (VITAMIN D) 2000 UNIT Tab Take 1 Tablet by mouth every day.      [DISCONTINUED] inclisiran sodium or PLACEBO (STUDY DRUG) 300 mg/1.5 mL injection Inject 1.5 mL under the skin one time for 1 dose. 1.5 mL 0     No facility-administered encounter medications on file as of 4/3/2023.     Review of Systems   Constitutional:  Negative for fever, malaise/fatigue and weight loss.   Eyes:  Negative for blurred vision.   Respiratory:  Negative for cough and shortness of breath.    Cardiovascular:  Negative for chest pain, palpitations, orthopnea, claudication, leg swelling and PND.   Gastrointestinal:  Negative for abdominal pain, blood in stool, nausea and vomiting.   Genitourinary:  Negative for dysuria, frequency and hematuria.   Musculoskeletal:  Negative for falls and myalgias.   Neurological:  Negative for dizziness, tingling and loss of consciousness.   Endo/Heme/Allergies:  Does not bruise/bleed easily.            Objective     /80 (BP Location: Left arm, Patient Position: Sitting, BP Cuff Size: Adult)   Pulse 77   Resp 16   Ht 1.854 m (6' 1\")   Wt (!) 129 kg (284 lb)   SpO2 " 96%   BMI 37.47 kg/m²     Physical Exam  Vitals reviewed.   Constitutional:       Appearance: He is well-developed.   HENT:      Head: Normocephalic and atraumatic.   Neck:      Vascular: No JVD.   Cardiovascular:      Rate and Rhythm: Normal rate and regular rhythm.      Pulses: Normal pulses.   Pulmonary:      Effort: Pulmonary effort is normal. No respiratory distress.   Musculoskeletal:      Right lower leg: No edema.      Left lower leg: No edema.   Skin:     General: Skin is warm and dry.      Findings: No erythema.   Neurological:      Mental Status: He is alert and oriented to person, place, and time.   Psychiatric:         Behavior: Behavior normal.          Lab Results   Component Value Date/Time    SODIUM 141 03/06/2023 06:46 AM    POTASSIUM 4.5 03/06/2023 06:46 AM    CHLORIDE 104 03/06/2023 06:46 AM    CO2 26 03/06/2023 06:46 AM    GLUCOSE 111 (H) 03/06/2023 06:46 AM    BUN 15 03/06/2023 06:46 AM    CREATININE 1.22 03/06/2023 06:46 AM     Lab Results   Component Value Date/Time    WBC 10.1 11/11/2016 04:46 PM    RBC 5.00 11/11/2016 04:46 PM    HEMOGLOBIN 15.9 03/06/2023 06:46 AM    HEMATOCRIT 47.8 03/06/2023 06:46 AM    MCV 89.0 11/11/2016 04:46 PM    MCH 29.6 11/11/2016 04:46 PM    MCHC 33.3 (L) 11/11/2016 04:46 PM    MPV 9.9 11/11/2016 04:46 PM    NEUTSPOLYS 77.50 (H) 10/11/2016 12:13 AM    LYMPHOCYTES 12.40 (L) 10/11/2016 12:13 AM    MONOCYTES 7.60 10/11/2016 12:13 AM    EOSINOPHILS 1.70 10/11/2016 12:13 AM    BASOPHILS 0.30 10/11/2016 12:13 AM    HYPOCHROMIA 1+ 09/30/2013 08:13 AM      No results found for: BNPBTYPENAT   No results found for: CRPCARDIAC  Lab Results   Component Value Date/Time    CHOLSTRLTOT 147 03/06/2023 06:46 AM    LDL 81 03/06/2023 06:46 AM    HDL 37 (A) 03/06/2023 06:46 AM    TRIGLYCERIDE 145 03/06/2023 06:46 AM       Lab Results   Component Value Date/Time    PROTHROMBTM 12.6 08/04/2013 12:50 AM    INR 0.92 08/04/2013 12:50 AM     Lab Results   Component Value Date/Time     TROPONINI <0.01 10/05/2016 03:00 PM      Treadmill Stress (1/29/2019): Provider conclusions and analysis:Baseline ECG:Normal ECG   Stress ECG: No ischemic T wave changes with peak stress   Impression: Normal stress ECG     Coronary CT heart (10/24/2022):  FINDINGS:  Limitations: No significant bolus or motion limitations are present.  Coronary calcification:  LMA - 18.1  LCX - 0.0  LAD - 0.0  RCA - 0.0  Total Calcium Score: 18.1  Percentile: Calcium score is above the 50th percentile for the patient's age and sex.  Coronary CTA:  Dominance: Right dominant circulation.     Left Main: Large caliber vessel with a normal takeoff from the left coronary cusp that bifurcates to form a left anterior descending artery and a left circumflex artery. Small amount of calcified plaque distally without significant luminal reduction.     LAD and Diagonals: Patent. Small amount of noncalcified plaque at the midportion near the takeoff of 2nd diagonal, with less than 50% luminal reduction. Ramus intermedius is patent with no evidence of plaque or stenosis.     LCX and Obtuse Marginals: Patent. No plaque or stenosis.     RCA, Posterior Descending and Posterolateral Branches: Patent. No plaque or stenosis.     Cardiac Structure and Morphology:  Left atrium: Normal in size. No thrombus in the left atrial appendage.  Left ventricle: Normal in size. No stigmata of prior infarction. No abnormal filling defect.  Pericardium: Normal thickness. No pericardial effusion or calcification.  Cardiac valves: No thickening or calcifications in the aortic or mitral valves.  Aorta: No aneurysm of the visualized thoracic aorta.  3D angiographic/MIP images of the vasculature confirm the vascular findings as described above.  Extracardiac findings:  Lungs: Apparent pleural thickening at the RIGHT lung apex posteriorly, partially visualized.  Punctate calcified nodules in the LEFT lower lobe and lingula consistent with granulomas.  Mediastinum: No  lymphadenopathy.  Upper abdomen: Elevated LEFT hemidiaphragm.  Bones: Unremarkable.     IMPRESSION:     1.  Small noncalcified plaque in the distal LEFT main coronary artery without significant luminal reduction.  2.  Small amount of noncalcified plaque in the mid LAD without significant stenosis.  3.  No other evidence for significant coronary artery stenosis or occlusion.  4.  Apparent focal pleural thickening versus less likely pleural-based mass at the RIGHT lung apex posteriorly, partially visualized.  Consider follow-up CT chest.     Calcium Score of 1-99 AND <75th percentile    Heart CTA (3/21/2023):  Coronary calcification:  LMA - 18.1  LCX - 0.0  LAD - 0.0  RCA - 0.0     Total Calcium Score: 18.1     Percentile: Calcium score is below the 75th percentile for the patient's age and sex.     Coronary CTA:  Dominance: Right dominant circulation.     Left Main: Large caliber vessel with a normal takeoff from the left coronary cusp that bifurcates to form a left anterior descending artery and a left circumflex artery. Minimal calcified plaque in the distal left main results in no hemodynamically   significant stenosis.     LAD and Diagonals: Patent. Minimal noncalcified plaque in the mid LAD results in no hemodynamically significant stenosis. Patent ramus intermedius.     LCX and Obtuse Marginals: Patent. No plaque or stenosis.     RCA, Posterior Descending and Posterolateral Branches: Patent. No plaque or stenosis.     Cardiac Structure and Morphology:  Left atrium: Normal in size. No thrombus in the left atrial appendage.  Left ventricle: Normal in size. No stigmata of prior infarction. No abnormal filling defect.  Pericardium: Normal thickness. No pericardial effusion or calcification.  Cardiac valves: No thickening or calcifications in the aortic or mitral valves.  Aorta: A 4 cm ascending aortic aneurysm.     3D angiographic/MIP images of the vasculature confirm the vascular findings as described above.      Extracardiac findings:     Lungs: A few tiny calcified granulomas.  Mediastinum: No lymphadenopathy.  Upper abdomen: Unremarkable.  Bones: Unremarkable.    Assessment & Plan     1. Research study patient        2. Family history of heart disease        3. Mixed hyperlipidemia              Medical Decision Making: Today's Assessment/Status/Plan:        Nonobstructive CAD; hyperlipidemia; premature family history CAD; pre-DM; BMI 30-39; hypertension; ED  -Blood pressure well controlled in office today  -Mild atherosclerosis on coronary CTA  -Continue aspirin and rosuvastatin as tolerated  -Continue metoprolol XL 12.5 mg daily as tolerated  -LDL 91.    -Randomization for inclisiran versus placebo with initial injection today.    Follow-up per protocol.  Sooner as needed    Patient verbalizes understanding and agrees with the plan of care.     MARIANNE Stern.R.N.   [facility]  [phone]    PLEASE NOTE: This Note was created using voice recognition Software. I have made every reasonable attempt to correct obvious errors, but I expect that there are errors of grammar and possibly content that I did not discover before finalizing the note

## 2023-04-03 NOTE — RESEARCH NOTE
"Name: Miky Benavidez   MRN: [id number]  Participant ID:  [id number]  : 1970  Visit Date/Time: 4/3/2023 10:00 AM    Study:    [id number] - Victorion Plaque   Status Consented/Enrolled (3/6/2023)   Active Start Date 3/6/23   Participant ID [id number]   Coordinator Michelle Austin; Michelle Lino; Shaista Baca   IRB [id number]   NCT [id number]    Wilder Hollis M.D.     Study Note:  Miky here today for study Day 1/Randomization. He is feeling well with no AE's or med changes to report.     ANGEL Crow conducted physical exam today and reviewed central lab results drawn on 3/21/23. Lifestyle and dietary education reviewed again.    Miky met all study inclusion/no exclusion requirements and was randomized today. Study medication of Inclisiran or placebo Kit #[id number] injection given at 10:39 am by ANGEL Crow in right lateral abdomen.     Patient Card given with instructions share with all of pts healthcare providers.    Patient left feeling well and understands to call with any questions or AE's.     Vitals:  BP OMRON reading after sitting for 5 minutes, 1-2 min apart  Sitting quietly starting 10:12am  115/85 pulse 74 @10:18am  107/84 pulse 79 @10:19am  114/85 pulse 75 @10:20am    WT w/o shoes 126.6kg    Labs:    Patient reports fasting for >10hours. Study labs drawn, processed, and shipped today per protocol.   Tracking #[id number] #[phone]    Meds:    Current Outpatient Medications   Medication Sig Dispense Refill    inclisiran sodium or PLACEBO (STUDY DRUG) 300 mg/1.5 mL injection Inject 1.5 mL under the skin one time for 1 dose. 1.5 mL 0    testosterone cypionate (DEPO-TESTOSTERONE) 200 MG/ML Solution injection Inject 0.5 mL into the shoulder, thigh, or buttocks every 14 days for 90 days. 6 mL 0    SYRINGE-NEEDLE, DISP, 3 ML (BD LUER-LOCK SYRINGE) 18G X 1-1/2\" 3 ML Misc 1 Each every 14 days. Use to draw up testosterone 30 Each 11    SYRINGE-NEEDLE, " "DISP, 3 ML (B-D 3CC LUER-LAKESHA SYR 22GX1\") 22G X 1\" 3 ML Misc 1 Each every 14 days. Use for testosterone injection 30 Each 11    vitamin D2, Ergocalciferol, (DRISDOL) 1.25 MG (12357 UT) Cap capsule Take 1 Capsule by mouth every 7 days. 12 Capsule 0    sildenafil citrate (VIAGRA) 100 MG tablet Take 1 Tablet by mouth as needed for Erectile Dysfunction. 10 Tablet 3    rosuvastatin (CRESTOR) 20 MG Tab Take 1 Tablet by mouth every evening. 90 Tablet 3    aspirin EC (ECOTRIN) 81 MG Tablet Delayed Response Take 1 Tablet by mouth every day. 90 Tablet 3    metoprolol SR (TOPROL XL) 25 MG TABLET SR 24 HR Take 0.5 Tablets by mouth every day. 45 Tablet 3    Cholecalciferol (VITAMIN D) 2000 UNIT Tab Take 1 Tablet by mouth every day.       No current facility-administered medications for this visit.    current meds    Follow-up: 3M Visit due 90 days from today. Patient scheduled work days are changing and he will call me to schedule appt when this is set.      "

## 2023-06-05 ENCOUNTER — HOSPITAL ENCOUNTER (OUTPATIENT)
Dept: LAB | Facility: MEDICAL CENTER | Age: 53
End: 2023-06-05
Payer: COMMERCIAL

## 2023-06-05 DIAGNOSIS — E29.1 SECONDARY MALE HYPOGONADISM: ICD-10-CM

## 2023-06-05 DIAGNOSIS — E55.9 VITAMIN D DEFICIENCY: ICD-10-CM

## 2023-06-05 LAB
25(OH)D3 SERPL-MCNC: 34 NG/ML (ref 30–100)
ALBUMIN SERPL BCP-MCNC: 4 G/DL (ref 3.2–4.9)
ALBUMIN/GLOB SERPL: 1.6 G/DL
ALP SERPL-CCNC: 71 U/L (ref 30–99)
ALT SERPL-CCNC: 19 U/L (ref 2–50)
ANION GAP SERPL CALC-SCNC: 10 MMOL/L (ref 7–16)
AST SERPL-CCNC: 20 U/L (ref 12–45)
BILIRUB SERPL-MCNC: 0.4 MG/DL (ref 0.1–1.5)
BUN SERPL-MCNC: 12 MG/DL (ref 8–22)
CALCIUM ALBUM COR SERPL-MCNC: 9 MG/DL (ref 8.5–10.5)
CALCIUM SERPL-MCNC: 9 MG/DL (ref 8.5–10.5)
CHLORIDE SERPL-SCNC: 107 MMOL/L (ref 96–112)
CO2 SERPL-SCNC: 25 MMOL/L (ref 20–33)
CREAT SERPL-MCNC: 1.09 MG/DL (ref 0.5–1.4)
GFR SERPLBLD CREATININE-BSD FMLA CKD-EPI: 82 ML/MIN/1.73 M 2
GLOBULIN SER CALC-MCNC: 2.5 G/DL (ref 1.9–3.5)
GLUCOSE SERPL-MCNC: 106 MG/DL (ref 65–99)
HCT VFR BLD AUTO: 48.1 % (ref 42–52)
HGB BLD-MCNC: 16 G/DL (ref 14–18)
POTASSIUM SERPL-SCNC: 4.5 MMOL/L (ref 3.6–5.5)
PROT SERPL-MCNC: 6.5 G/DL (ref 6–8.2)
PSA SERPL-MCNC: 1.49 NG/ML (ref 0–4)
SODIUM SERPL-SCNC: 142 MMOL/L (ref 135–145)
T4 FREE SERPL-MCNC: 1.24 NG/DL (ref 0.93–1.7)
TSH SERPL DL<=0.005 MIU/L-ACNC: 1.25 UIU/ML (ref 0.38–5.33)

## 2023-06-05 PROCEDURE — 84439 ASSAY OF FREE THYROXINE: CPT

## 2023-06-05 PROCEDURE — 84403 ASSAY OF TOTAL TESTOSTERONE: CPT

## 2023-06-05 PROCEDURE — 82306 VITAMIN D 25 HYDROXY: CPT

## 2023-06-05 PROCEDURE — 85018 HEMOGLOBIN: CPT

## 2023-06-05 PROCEDURE — 36415 COLL VENOUS BLD VENIPUNCTURE: CPT

## 2023-06-05 PROCEDURE — 84153 ASSAY OF PSA TOTAL: CPT

## 2023-06-05 PROCEDURE — 84270 ASSAY OF SEX HORMONE GLOBUL: CPT

## 2023-06-05 PROCEDURE — 84402 ASSAY OF FREE TESTOSTERONE: CPT

## 2023-06-05 PROCEDURE — 80053 COMPREHEN METABOLIC PANEL: CPT

## 2023-06-05 PROCEDURE — 85014 HEMATOCRIT: CPT

## 2023-06-05 PROCEDURE — 84443 ASSAY THYROID STIM HORMONE: CPT

## 2023-06-07 LAB
PSA FREE MFR SERPL: NORMAL %
PSA FREE SERPL-MCNC: NORMAL NG/ML
PSA SERPL-MCNC: 1.5 NG/ML (ref 0–4)
SHBG SERPL-SCNC: 26 NMOL/L (ref 19–76)
TESTOST FREE MFR SERPL: 1.9 % (ref 1.6–2.9)
TESTOST FREE SERPL-MCNC: 26 PG/ML (ref 47–244)
TESTOST SERPL-MCNC: 133 NG/DL (ref 300–890)

## 2023-06-13 ENCOUNTER — OFFICE VISIT (OUTPATIENT)
Dept: ENDOCRINOLOGY | Facility: MEDICAL CENTER | Age: 53
End: 2023-06-13
Payer: COMMERCIAL

## 2023-06-13 VITALS
DIASTOLIC BLOOD PRESSURE: 86 MMHG | OXYGEN SATURATION: 96 % | HEIGHT: 73 IN | WEIGHT: 276.4 LBS | HEART RATE: 77 BPM | SYSTOLIC BLOOD PRESSURE: 124 MMHG | BODY MASS INDEX: 36.63 KG/M2

## 2023-06-13 DIAGNOSIS — R73.03 PREDIABETES: ICD-10-CM

## 2023-06-13 DIAGNOSIS — Z79.899 HIGH RISK MEDICATION USE: ICD-10-CM

## 2023-06-13 DIAGNOSIS — Z79.899 CONTROLLED SUBSTANCE AGREEMENT SIGNED: ICD-10-CM

## 2023-06-13 DIAGNOSIS — E55.9 VITAMIN D DEFICIENCY: ICD-10-CM

## 2023-06-13 DIAGNOSIS — E66.9 OBESITY (BMI 35.0-39.9 WITHOUT COMORBIDITY): ICD-10-CM

## 2023-06-13 DIAGNOSIS — E29.1 SECONDARY MALE HYPOGONADISM: ICD-10-CM

## 2023-06-13 DIAGNOSIS — E78.49 OTHER HYPERLIPIDEMIA: ICD-10-CM

## 2023-06-13 LAB
HBA1C MFR BLD: 5.6 % (ref ?–5.8)
POCT INT CON NEG: NEGATIVE
POCT INT CON POS: POSITIVE

## 2023-06-13 PROCEDURE — 3079F DIAST BP 80-89 MM HG: CPT

## 2023-06-13 PROCEDURE — 99212 OFFICE O/P EST SF 10 MIN: CPT

## 2023-06-13 PROCEDURE — 83036 HEMOGLOBIN GLYCOSYLATED A1C: CPT

## 2023-06-13 PROCEDURE — 3074F SYST BP LT 130 MM HG: CPT

## 2023-06-13 PROCEDURE — 99214 OFFICE O/P EST MOD 30 MIN: CPT

## 2023-06-13 RX ORDER — TESTOSTERONE CYPIONATE 200 MG/ML
120 INJECTION, SOLUTION INTRAMUSCULAR
Qty: 4 ML | Refills: 0 | Status: SHIPPED | OUTPATIENT
Start: 2023-06-13 | End: 2023-09-11

## 2023-06-13 NOTE — PROGRESS NOTES
Chief Complaint:     HPI:   Miky Benavidez is a 52 y.o. male     Secondary Hypogonadism in Male:   Took testosterone for 1 year in 2020 after low testosterone levels in 2019,   Reports emotional, brain fogginess, muscle weakness, erectyle dysfunction, decrease libido     At this time we are treating diagnoses #2 and #5 prior to beginning taking testosterone-tried and failed Monjaro  Reports needle phobia   Testosterone cypionate 100 mg every 2 weeks sent to pharmacy     Latest Reference Range & Units 06/05/23 07:58   Hemoglobin 14.0 - 18.0 g/dL 16.0   Hematocrit 42.0 - 52.0 % 48.1     Did blood day before next injection   Latest Reference Range & Units 06/05/23 07:58   Sex Hormone Bind Globulin 19 - 76 nmol/L 26   Testosterone % Free 1.6 - 2.9 % 1.9   Testosterone,Total 300 - 890 ng/dL 133 (L)      Latest Reference Range & Units 06/05/23 07:58   GFR (CKD-EPI) >60 mL/min/1.73 m 2 82      Latest Reference Range & Units 06/05/23 07:58   PSA Total 0.0 - 4.0 ng/mL 1.5      Latest Reference Range & Units 06/05/23 07:58   TSH 0.380 - 5.330 uIU/mL 1.250   Free T-4 0.93 - 1.70 ng/dL 1.24       2.  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  Other hyperlipidemia:  Currently taking Crestor 10 mg daily  Follow-up PCP     Latest Reference Range & Units 09/13/22 09:32   Cholesterol,Tot 100 - 199 mg/dL 171   Triglycerides 0 - 149 mg/dL 141   HDL >=40 mg/dL 37 !   LDL <100 mg/dL 106 (H)     4.  Vitamin D deficiency:  Currently taking 2000 IUs BID OTC daily vitamin D3   Latest Reference Range & Units 06/05/23 07:58   25-Hydroxy   Vitamin D 25 30 - 100 ng/mL 34       5.  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  High risk medication use:  On testosterone for hypogonadism treatment    7.  Controlled substance agreement signed:  Will be signed at next appointment    Patient's medications, allergies, and social histories were reviewed and updated as appropriate.    ROS:      CONS:     No fever, no chills, no weight loss, no fatigue   EYES:      No diplopia, no blurry vision, no redness of eyes, no swelling of eyelids   ENT:    No hearing loss, No ear pain, No sore throat, no dysphagia, no neck swelling   CV:     No chest pain, no palpitations, no claudication, no orthopnea, no PND   PULM:    No SOB, no cough, no hemoptysis, no wheezing    GI:   No nausea, no vomiting, no diarrhea, no constipation, no bloody stools   :  Passing urine well, no dysuria, no hematuria   ENDO:   No polyuria, no polydipsia, no heat intolerance, no cold intolerance   NEURO: No headaches, no dizziness, no convulsions, no tremors   MUSC:  No joint swellings, no arthralgias, no myalgias, no weakness   SKIN:   No rash, no ulcers, no dry skin   PSYCH:   No depression, no anxiety, no difficulty sleeping       Past Medical History:  Patient Active Problem List    Diagnosis Date Noted    Mixed hyperlipidemia 05/28/2019    Influenza vaccination declined 11/21/2018    Obesity (BMI 30-39.9) 11/20/2018    Calculus of bile duct without mention of cholecystitis or obstruction 11/17/2016    History of biliary stent insertion 10/24/2016    Common wart 07/17/2015    Hypotestosteronism 07/14/2015    Family history of heart disease 07/09/2015    Erectile dysfunction 07/08/2015    SOB (shortness of breath) 07/08/2015    Vitamin d deficiency 08/05/2013    Intervertebral disc rupture 08/04/2013    Cauda equina compression (HCC) 08/04/2013       Past Surgical History:  Past Surgical History:   Procedure Laterality Date    ERCP W/  "INSERTION STENT/TUBE N/A 11/17/2016    Procedure: ERCP W/ INSERTION STENT/TUBE - FOR STENT PLACEMENT/REMOVAL;  Surgeon: Andriy Somers M.D.;  Location: [facility];  Service:     ERCP W/REMOVAL CALCULUS N/A 11/17/2016    Procedure: ERCP W/REMOVAL CALCULUS;  Surgeon: Andriy Somers M.D.;  Location: [facility];  Service:     ERCP IN OR  10/7/2016    Procedure: Endoscopic retrogade cholangiopancreatography;  Surgeon: Andriy Somers M.D.;  Location: [facility];  Service:     SANDI BY LAPAROSCOPY  10/6/2016    Procedure: SANDI BY LAPAROSCOPY - WITH INTRAOPERATIVE CHOLANGIOGRAMS;  Surgeon: Keaton Parisi M.D.;  Location: [facility];  Service:     LUMBAR LAMINECTOMY DISKECTOMY  8/5/2013    Performed by Escobar Capps M.D. at [facility]    TONSILLECTOMY          Allergies:  Patient has no known allergies.     Current Medications:    Current Outpatient Medications:     SYRINGE-NEEDLE, DISP, 3 ML (BD LUER-LOCK SYRINGE) 18G X 1-1/2\" 3 ML Misc, 1 Each every 14 days. Use to draw up testosterone, Disp: 30 Each, Rfl: 11    SYRINGE-NEEDLE, DISP, 3 ML (B-D 3CC LUER-LAKESHA SYR 22GX1\") 22G X 1\" 3 ML Misc, 1 Each every 14 days. Use for testosterone injection, Disp: 30 Each, Rfl: 11    vitamin D2, Ergocalciferol, (DRISDOL) 1.25 MG (86451 UT) Cap capsule, Take 1 Capsule by mouth every 7 days., Disp: 12 Capsule, Rfl: 0    sildenafil citrate (VIAGRA) 100 MG tablet, Take 1 Tablet by mouth as needed for Erectile Dysfunction., Disp: 10 Tablet, Rfl: 3    rosuvastatin (CRESTOR) 20 MG Tab, Take 1 Tablet by mouth every evening., Disp: 90 Tablet, Rfl: 3    aspirin EC (ECOTRIN) 81 MG Tablet Delayed Response, Take 1 Tablet by mouth every day., Disp: 90 Tablet, Rfl: 3    metoprolol SR (TOPROL XL) 25 MG TABLET SR 24 HR, Take 0.5 Tablets by mouth every day., Disp: 45 Tablet, Rfl: 3    Cholecalciferol (VITAMIN D) 2000 UNIT Tab, Take 1 Tablet by mouth every day., Disp: , Rfl: "     Social History:  Social History     Socioeconomic History    Marital status:      Spouse name: Not on file    Number of children: Not on file    Years of education: Not on file    Highest education level: Not on file   Occupational History    Not on file   Tobacco Use    Smoking status: Never    Smokeless tobacco: Never   Vaping Use    Vaping Use: Never used   Substance and Sexual Activity    Alcohol use: Not Currently     Comment: occ    Drug use: No    Sexual activity: Yes     Partners: Female   Other Topics Concern    Not on file   Social History Narrative    Not on file     Social Determinants of Health     Financial Resource Strain: Not on file   Food Insecurity: Not on file   Transportation Needs: Not on file   Physical Activity: Not on file   Stress: Not on file   Social Connections: Not on file   Intimate Partner Violence: Not on file   Housing Stability: Not on file        Family History:   Family History   Problem Relation Age of Onset    Diabetes Mother     Diabetes Father     Heart Disease Father          PHYSICAL EXAM:   Vital signs:   Vitals:    06/13/23 0910   BP: 124/86   Pulse: 77   SpO2: 96%      GENERAL: Well-developed, well-nourished  in no apparent distress.   LYMPH: No cervical, supraclavicular,  adenopathy palpated.   SKIN: No rashes, lesions. Turgor is normal.    Labs:    Lab Results   Component Value Date/Time    TSHULTRASEN 1.460 10/31/2020 0830     No results found for: FREEDIR  Lab Results   Component Value Date/Time    FREET3 3.56 10/31/2020 0830     No results found for: THYSTIMIG    No results found for: LH    Lab Results   Component Value Date/Time    FSH <0.3 (L) 10/31/2020 0830       Lab Results   Component Value Date/Time    TESTOSTERONE 250 11/01/2022 0853           Imaging:      ASSESSMENT/PLAN:   1. Secondary male hypogonadism  Clinically unstable  Biochemically unstable  Testosterone cypionate increased to 120 mg every 14 days (0.6ml)  Discussed to do blood work in  between injections  Discussed risk of too much testosterone supplementation  - testosterone cypionate (DEPO-TESTOSTERONE) 200 MG/ML Solution injection; Inject 0.6 mL into the shoulder, thigh, or buttocks every 14 days for 90 days.  Dispense: 4 mL; Refill: 0  - Testosterone, Free & Total, Adult Male (w/SHBG); Future  - HEMOGLOBIN AND HEMATOCRIT; Future  - Comp Metabolic Panel; Future    2. Prediabetes  Unstable  Lifestyle modifications discussed  - POCT Hemoglobin A1C    3. Obesity (BMI 35.0-39.9 without comorbidity)  Unstable  Lifestyle modifications discussed    4. Other hyperlipidemia  Unstable  Followed by PCP    5. Vitamin D deficiency  Stable  - VITAMIN D,25 HYDROXY (DEFICIENCY); Future    6. High risk medication use  On testosterone for diagnoses #1    7. Controlled substance agreement signed  Controlled substance agreement      Disposition: Make an appointment to follow-up in 3 months  Do your blood work 2 weeks prior to next appointment        Thank you kindly for allowing me to participate in the endocrine care plan for this patient.    MANJIT [email or web address].  06/13/23      CC:   Rosa Riddle M.D.

## 2023-06-26 ENCOUNTER — OFFICE VISIT (OUTPATIENT)
Dept: CARDIOLOGY | Facility: MEDICAL CENTER | Age: 53
End: 2023-06-26
Attending: NURSE PRACTITIONER
Payer: COMMERCIAL

## 2023-06-26 VITALS
RESPIRATION RATE: 17 BRPM | WEIGHT: 278.2 LBS | BODY MASS INDEX: 36.87 KG/M2 | DIASTOLIC BLOOD PRESSURE: 80 MMHG | HEIGHT: 73 IN | SYSTOLIC BLOOD PRESSURE: 122 MMHG | OXYGEN SATURATION: 96 % | HEART RATE: 65 BPM

## 2023-06-26 DIAGNOSIS — R93.1 ELEVATED CORONARY ARTERY CALCIUM SCORE: ICD-10-CM

## 2023-06-26 DIAGNOSIS — E78.2 MIXED HYPERLIPIDEMIA: ICD-10-CM

## 2023-06-26 DIAGNOSIS — Z82.49 FAMILY HISTORY OF HEART ATTACK: ICD-10-CM

## 2023-06-26 DIAGNOSIS — Z00.6 RESEARCH STUDY PATIENT: ICD-10-CM

## 2023-06-26 DIAGNOSIS — N52.9 ERECTILE DYSFUNCTION, UNSPECIFIED ERECTILE DYSFUNCTION TYPE: ICD-10-CM

## 2023-06-26 DIAGNOSIS — Z82.49 FAMILY HISTORY OF HEART DISEASE: ICD-10-CM

## 2023-06-26 PROCEDURE — 99212 OFFICE O/P EST SF 10 MIN: CPT | Performed by: NURSE PRACTITIONER

## 2023-06-26 PROCEDURE — 99214 OFFICE O/P EST MOD 30 MIN: CPT | Performed by: NURSE PRACTITIONER

## 2023-06-26 PROCEDURE — 3079F DIAST BP 80-89 MM HG: CPT | Performed by: NURSE PRACTITIONER

## 2023-06-26 PROCEDURE — 3074F SYST BP LT 130 MM HG: CPT | Performed by: NURSE PRACTITIONER

## 2023-06-26 ASSESSMENT — ENCOUNTER SYMPTOMS
NAUSEA: 0
PALPITATIONS: 0
BRUISES/BLEEDS EASILY: 0
DIZZINESS: 0
FEVER: 0
MYALGIAS: 0
ABDOMINAL PAIN: 0
PND: 0
ORTHOPNEA: 0
BLOOD IN STOOL: 0
VOMITING: 0
SHORTNESS OF BREATH: 0
TINGLING: 0
COUGH: 0
BLURRED VISION: 0
LOSS OF CONSCIOUSNESS: 0
WEIGHT LOSS: 0
CLAUDICATION: 0
FALLS: 0

## 2023-06-26 NOTE — RESEARCH NOTE
"Name: Miky Benavidez   MRN: [id number]  Participant ID:  [id number]  : 1970  Visit Date/Time: 2023 9:00 AM      Study:    [id number] - [facility]   Status Consented/Enrolled (3/6/2023)   Active Start Date 3/6/23   Participant ID [id number]   Coordinator Michelle Austin; Michelle Lino; Shaista Baca   IRB [id number]   [facility] [id number]    Wilder Hollis M.D.     Study Note:     Miky here today for study 3 Month visit. He is feeling well with no AE's or med changes to report.   He forgot to take his BP and other medications last night.    ANGEL Crow conducted physical exam today, see her note.    Study medication of Inclisiran or placebo Kit #[id number] injection given at 9:41am by ANGEL Crow in left lateral abdomen.     Patient left feeling well and understands to call with any questions or AE's.    Vitals:  BP OMRON reading after sitting for 5 minutes, 1-2 min apart  Sitting quietly starting 9:18am  124/98 hr 67 @9:23am  132/96 hr 65 @9:25am  118/93 hr 68 @9:27am    WT w/o shoes 126kg    Patient reports fasting for >10hours. Study labs drawn and processed today per protocol.   Refrigerated labs shipped today Tracking #[id number]. Frozen labs will be sent Wednesday.      Labs:                  Recent Results (from the past 336 hour(s))   POCT Hemoglobin A1C    Collection Time: 23  9:06 AM   Result Value Ref Range    Glycohemoglobin 5.6 5.8 %    Internal Control Positive Positive     Internal Control Negative Negative          Meds:    Current Outpatient Medications   Medication Sig Dispense Refill    testosterone cypionate (DEPO-TESTOSTERONE) 200 MG/ML Solution injection Inject 0.6 mL into the shoulder, thigh, or buttocks every 14 days for 90 days. 4 mL 0    SYRINGE-NEEDLE, DISP, 3 ML (BD LUER-LOCK SYRINGE) 18G X 1-1/2\" 3 ML Misc 1 Each every 14 days. Use to draw up testosterone 30 Each 11    SYRINGE-NEEDLE, DISP, 3 ML (B-D 3CC LUER-LAKESHA SYR 22GX1\") " "22G X 1\" 3 ML Misc 1 Each every 14 days. Use for testosterone injection 30 Each 11    vitamin D2, Ergocalciferol, (DRISDOL) 1.25 MG (43337 UT) Cap capsule Take 1 Capsule by mouth every 7 days. 12 Capsule 0    sildenafil citrate (VIAGRA) 100 MG tablet Take 1 Tablet by mouth as needed for Erectile Dysfunction. 10 Tablet 3    rosuvastatin (CRESTOR) 20 MG Tab Take 1 Tablet by mouth every evening. 90 Tablet 3    aspirin EC (ECOTRIN) 81 MG Tablet Delayed Response Take 1 Tablet by mouth every day. 90 Tablet 3    metoprolol SR (TOPROL XL) 25 MG TABLET SR 24 HR Take 0.5 Tablets by mouth every day. 45 Tablet 3    Cholecalciferol (VITAMIN D) 2000 UNIT Tab Take 1 Tablet by mouth every day.       Current Facility-Administered Medications   Medication Dose Route Frequency Provider Last Rate Last Admin    inclisiran sodium or PLACEBO (STUDY DRUG) injection 300 mg  300 mg Subcutaneous Once Wilder Hollis M.D.        current meds            Follow-up: 9 Month Visit 12/11/23       "

## 2023-06-26 NOTE — PROGRESS NOTES
Chief Complaint   Patient presents with    Other     F/V DX:Research study patient       Anshu Benavidez is a 51 y.o. male who presents today for cardiac follow-up research trial of Victorian plaque regarding hyperlipidemia, coronary artery calcium on CT, and premature family history of heart disease.  Kindly referred by Dr. Riddle.  In addition, patient is on medical problems of BMI 30-39, Hx Covid PNA (10/2021), erectile dysfunction, and vitamin D deficiency.  Patient was last seen by myself on 4/3/2023 for initial injection.    Cardiovascular Risk Factors:  1. Smoking status: Denies  2. Type II Diabetes Mellitus: A1C 5.3  3. Hypertension:  Present on exam today  4. Dyslipidemia: present  5. Family history of early Coronary Artery Disease in a first degree relative (Male less than 55 years of age; Female less than 65 years of age): Present.  Father at the age of 52  6.  Obesity and/or Metabolic Syndrome: Present  7. Sedentary lifestyle: Present; physically active on weekends  8. Substance Abuse (ETOH, Caffine, Recreational substances): Hx high caffeine use, 5 hour energy when working nights (1-2x/week)  9. Hx of CKD or autoimmune diseases (lupus or RA): denies    Today, Patient feels well, denies chest pain, shortness of breath, palpitations, dizziness/lightheadedness, orthopnea, PND or Edema.  Patient was started agility training with his dog denies exacerbation of symptoms.  Patient being followed by endocrinology for low testosterone.  Patient denies exacerbation of symptoms with increased physical activity.  Patient encouraged on positive efforts to increase cardiovascular physical activity.    Past Medical History:   Diagnosis Date    Hepatic steatosis     Hypogonadism in male     Vitamin D deficiency      Past Surgical History:   Procedure Laterality Date    ERCP W/ INSERTION STENT/TUBE N/A 11/17/2016    Procedure: ERCP W/ INSERTION STENT/TUBE - FOR STENT PLACEMENT/REMOVAL;  Surgeon: Andriy RUBALCAVA  CODIE Somers;  Location: SURGERY [facility];  Service:     ERCP W/REMOVAL CALCULUS N/A 11/17/2016    Procedure: ERCP W/REMOVAL CALCULUS;  Surgeon: Andriy Somers M.D.;  Location: SURGERY [facility];  Service:     ERCP IN OR  10/7/2016    Procedure: Endoscopic retrogade cholangiopancreatography;  Surgeon: Andriy Somers M.D.;  Location: SURGERY [facility];  Service:     SANDI BY LAPAROSCOPY  10/6/2016    Procedure: SANDI BY LAPAROSCOPY - WITH INTRAOPERATIVE CHOLANGIOGRAMS;  Surgeon: Keaton Parisi M.D.;  Location: SURGERY [facility];  Service:     LUMBAR LAMINECTOMY DISKECTOMY  8/5/2013    Performed by Escobar Capps M.D. at SURGERY [facility]    TONSILLECTOMY       Family History   Problem Relation Age of Onset    Diabetes Mother     Diabetes Father     Heart Disease Father      Social History     Socioeconomic History    Marital status:      Spouse name: Not on file    Number of children: Not on file    Years of education: Not on file    Highest education level: Not on file   Occupational History    Not on file   Tobacco Use    Smoking status: Never    Smokeless tobacco: Never   Vaping Use    Vaping Use: Never used   Substance and Sexual Activity    Alcohol use: Not Currently     Comment: occ    Drug use: No    Sexual activity: Yes     Partners: Female   Other Topics Concern    Not on file   Social History Narrative    Not on file     Social Determinants of Health     Financial Resource Strain: Not on file   Food Insecurity: Not on file   Transportation Needs: Not on file   Physical Activity: Not on file   Stress: Not on file   Social Connections: Not on file   Intimate Partner Violence: Not on file   Housing Stability: Not on file     No Known Allergies  Outpatient Encounter Medications as of 6/26/2023   Medication Sig Dispense Refill    testosterone cypionate (DEPO-TESTOSTERONE) 200 MG/ML Solution injection Inject 0.6 mL into the shoulder, thigh, or buttocks every 14  "days for 90 days. 4 mL 0    SYRINGE-NEEDLE, DISP, 3 ML (BD LUER-LOCK SYRINGE) 18G X 1-1/2\" 3 ML Misc 1 Each every 14 days. Use to draw up testosterone 30 Each 11    SYRINGE-NEEDLE, DISP, 3 ML (B-D 3CC LUER-LAKESHA SYR 22GX1\") 22G X 1\" 3 ML Misc 1 Each every 14 days. Use for testosterone injection 30 Each 11    vitamin D2, Ergocalciferol, (DRISDOL) 1.25 MG (47488 UT) Cap capsule Take 1 Capsule by mouth every 7 days. 12 Capsule 0    sildenafil citrate (VIAGRA) 100 MG tablet Take 1 Tablet by mouth as needed for Erectile Dysfunction. 10 Tablet 3    rosuvastatin (CRESTOR) 20 MG Tab Take 1 Tablet by mouth every evening. 90 Tablet 3    aspirin EC (ECOTRIN) 81 MG Tablet Delayed Response Take 1 Tablet by mouth every day. 90 Tablet 3    metoprolol SR (TOPROL XL) 25 MG TABLET SR 24 HR Take 0.5 Tablets by mouth every day. 45 Tablet 3    Cholecalciferol (VITAMIN D) 2000 UNIT Tab Take 1 Tablet by mouth every day.       Facility-Administered Encounter Medications as of 6/26/2023   Medication Dose Route Frequency Provider Last Rate Last Admin    inclisiran sodium or PLACEBO (STUDY DRUG) injection 300 mg  300 mg Subcutaneous Once Wilder Hollis M.D.         Review of Systems   Constitutional:  Negative for fever, malaise/fatigue and weight loss.   Eyes:  Negative for blurred vision.   Respiratory:  Negative for cough and shortness of breath.    Cardiovascular:  Negative for chest pain, palpitations, orthopnea, claudication, leg swelling and PND.   Gastrointestinal:  Negative for abdominal pain, blood in stool, nausea and vomiting.   Genitourinary:  Negative for dysuria, frequency and hematuria.   Musculoskeletal:  Negative for falls and myalgias.   Neurological:  Negative for dizziness, tingling and loss of consciousness.   Endo/Heme/Allergies:  Does not bruise/bleed easily.              Objective     /80 (BP Location: Left arm, Patient Position: Sitting, BP Cuff Size: Adult)   Pulse 65   Resp 17   Ht 1.854 m (6' 1\")   Wt " (!) 126 kg (278 lb 3.2 oz)   SpO2 96%   BMI 36.70 kg/m²     Physical Exam  Vitals reviewed.   Constitutional:       Appearance: He is well-developed.   HENT:      Head: Normocephalic and atraumatic.   Neck:      Vascular: No JVD.   Cardiovascular:      Rate and Rhythm: Normal rate and regular rhythm.      Pulses: Normal pulses.   Pulmonary:      Effort: Pulmonary effort is normal. No respiratory distress.   Musculoskeletal:      Right lower leg: No edema.      Left lower leg: No edema.   Skin:     General: Skin is warm and dry.      Findings: No erythema.   Neurological:      Mental Status: He is alert and oriented to person, place, and time.   Psychiatric:         Behavior: Behavior normal.            Lab Results   Component Value Date/Time    SODIUM 142 06/05/2023 07:58 AM    POTASSIUM 4.5 06/05/2023 07:58 AM    CHLORIDE 107 06/05/2023 07:58 AM    CO2 25 06/05/2023 07:58 AM    GLUCOSE 106 (H) 06/05/2023 07:58 AM    BUN 12 06/05/2023 07:58 AM    CREATININE 1.09 06/05/2023 07:58 AM     Lab Results   Component Value Date/Time    WBC 10.1 11/11/2016 04:46 PM    RBC 5.00 11/11/2016 04:46 PM    HEMOGLOBIN 16.0 06/05/2023 07:58 AM    HEMATOCRIT 48.1 06/05/2023 07:58 AM    MCV 89.0 11/11/2016 04:46 PM    MCH 29.6 11/11/2016 04:46 PM    MCHC 33.3 (L) 11/11/2016 04:46 PM    MPV 9.9 11/11/2016 04:46 PM    NEUTSPOLYS 77.50 (H) 10/11/2016 12:13 AM    LYMPHOCYTES 12.40 (L) 10/11/2016 12:13 AM    MONOCYTES 7.60 10/11/2016 12:13 AM    EOSINOPHILS 1.70 10/11/2016 12:13 AM    BASOPHILS 0.30 10/11/2016 12:13 AM    HYPOCHROMIA 1+ 09/30/2013 08:13 AM      No results found for: BNPBTYPENAT   No results found for: CRPCARDIAC  Lab Results   Component Value Date/Time    CHOLSTRLTOT 147 03/06/2023 06:46 AM    LDL 81 03/06/2023 06:46 AM    HDL 37 (A) 03/06/2023 06:46 AM    TRIGLYCERIDE 145 03/06/2023 06:46 AM       Lab Results   Component Value Date/Time    PROTHROMBTM 12.6 08/04/2013 12:50 AM    INR 0.92 08/04/2013 12:50 AM     Lab  Results   Component Value Date/Time    TROPONINI <0.01 10/05/2016 03:00 PM      Treadmill Stress (1/29/2019): Provider conclusions and analysis:Baseline ECG:Normal ECG   Stress ECG: No ischemic T wave changes with peak stress   Impression: Normal stress ECG     Coronary CT heart (10/24/2022):  FINDINGS:  Limitations: No significant bolus or motion limitations are present.  Coronary calcification:  LMA - 18.1  LCX - 0.0  LAD - 0.0  RCA - 0.0  Total Calcium Score: 18.1  Percentile: Calcium score is above the 50th percentile for the patient's age and sex.  Coronary CTA:  Dominance: Right dominant circulation.     Left Main: Large caliber vessel with a normal takeoff from the left coronary cusp that bifurcates to form a left anterior descending artery and a left circumflex artery. Small amount of calcified plaque distally without significant luminal reduction.     LAD and Diagonals: Patent. Small amount of noncalcified plaque at the midportion near the takeoff of 2nd diagonal, with less than 50% luminal reduction. Ramus intermedius is patent with no evidence of plaque or stenosis.     LCX and Obtuse Marginals: Patent. No plaque or stenosis.     RCA, Posterior Descending and Posterolateral Branches: Patent. No plaque or stenosis.     Cardiac Structure and Morphology:  Left atrium: Normal in size. No thrombus in the left atrial appendage.  Left ventricle: Normal in size. No stigmata of prior infarction. No abnormal filling defect.  Pericardium: Normal thickness. No pericardial effusion or calcification.  Cardiac valves: No thickening or calcifications in the aortic or mitral valves.  Aorta: No aneurysm of the visualized thoracic aorta.  3D angiographic/MIP images of the vasculature confirm the vascular findings as described above.  Extracardiac findings:  Lungs: Apparent pleural thickening at the RIGHT lung apex posteriorly, partially visualized.  Punctate calcified nodules in the LEFT lower lobe and lingula consistent  with granulomas.  Mediastinum: No lymphadenopathy.  Upper abdomen: Elevated LEFT hemidiaphragm.  Bones: Unremarkable.     IMPRESSION:     1.  Small noncalcified plaque in the distal LEFT main coronary artery without significant luminal reduction.  2.  Small amount of noncalcified plaque in the mid LAD without significant stenosis.  3.  No other evidence for significant coronary artery stenosis or occlusion.  4.  Apparent focal pleural thickening versus less likely pleural-based mass at the RIGHT lung apex posteriorly, partially visualized.  Consider follow-up CT chest.     Calcium Score of 1-99 AND <75th percentile    Heart CTA (3/21/2023):  Coronary calcification:  LMA - 18.1  LCX - 0.0  LAD - 0.0  RCA - 0.0     Total Calcium Score: 18.1     Percentile: Calcium score is below the 75th percentile for the patient's age and sex.     Coronary CTA:  Dominance: Right dominant circulation.     Left Main: Large caliber vessel with a normal takeoff from the left coronary cusp that bifurcates to form a left anterior descending artery and a left circumflex artery. Minimal calcified plaque in the distal left main results in no hemodynamically   significant stenosis.     LAD and Diagonals: Patent. Minimal noncalcified plaque in the mid LAD results in no hemodynamically significant stenosis. Patent ramus intermedius.     LCX and Obtuse Marginals: Patent. No plaque or stenosis.     RCA, Posterior Descending and Posterolateral Branches: Patent. No plaque or stenosis.     Cardiac Structure and Morphology:  Left atrium: Normal in size. No thrombus in the left atrial appendage.  Left ventricle: Normal in size. No stigmata of prior infarction. No abnormal filling defect.  Pericardium: Normal thickness. No pericardial effusion or calcification.  Cardiac valves: No thickening or calcifications in the aortic or mitral valves.  Aorta: A 4 cm ascending aortic aneurysm.     3D angiographic/MIP images of the vasculature confirm the vascular  findings as described above.     Extracardiac findings:     Lungs: A few tiny calcified granulomas.  Mediastinum: No lymphadenopathy.  Upper abdomen: Unremarkable.  Bones: Unremarkable.    Assessment & Plan     1. Research study patient        2. Family history of heart disease        3. Mixed hyperlipidemia        4. Family history of heart attack        5. Erectile dysfunction, unspecified erectile dysfunction type        6. Elevated coronary artery calcium score              Medical Decision Making: Today's Assessment/Status/Plan:        Nonobstructive CAD; hyperlipidemia; premature family history CAD; pre-DM; BMI 30-39; hypertension; ED  -Blood pressure well controlled in office today  -Mild atherosclerosis on coronary CTA  -Continue aspirin and rosuvastatin as tolerated  -Continue metoprolol XL 12.5 mg daily as tolerated  -LDL 91.    -Victorian Plaque with Inclisiran versus placebo with second injection today.    Follow-up per protocol.  Sooner as needed    Patient verbalizes understanding and agrees with the plan of care.     CANDELARIO Stern.   [facility]  [phone]    PLEASE NOTE: This Note was created using voice recognition Software. I have made every reasonable attempt to correct obvious errors, but I expect that there are errors of grammar and possibly content that I did not discover before finalizing the note

## 2023-08-11 DIAGNOSIS — E66.9 OBESITY (BMI 30-39.9): ICD-10-CM

## 2023-08-11 DIAGNOSIS — Z82.49 FAMILY HISTORY OF HEART DISEASE: ICD-10-CM

## 2023-08-11 DIAGNOSIS — Z82.49 FAMILY HISTORY OF HEART ATTACK: ICD-10-CM

## 2023-08-11 DIAGNOSIS — R94.39 ABNORMAL STRESS TEST: ICD-10-CM

## 2023-08-11 DIAGNOSIS — R06.02 SHORTNESS OF BREATH: ICD-10-CM

## 2023-08-11 DIAGNOSIS — E78.2 MIXED HYPERLIPIDEMIA: ICD-10-CM

## 2023-08-11 RX ORDER — METOPROLOL SUCCINATE 25 MG/1
12.5 TABLET, EXTENDED RELEASE ORAL DAILY
Qty: 45 TABLET | Refills: 3 | Status: SHIPPED | OUTPATIENT
Start: 2023-08-11 | End: 2024-01-03 | Stop reason: SDUPTHER

## 2023-08-11 NOTE — TELEPHONE ENCOUNTER
Is the patient due for a refill? Yes    Was the patient seen the past year? Yes    Date of last office visit: 6/26/2023    Does the patient have an upcoming appointment?  Yes   If yes, When? 12/11/2023    Provider to refill:KEVIN    Does the patients insurance require a 100 day supply?  No

## 2023-09-11 ENCOUNTER — HOSPITAL ENCOUNTER (OUTPATIENT)
Dept: LAB | Facility: MEDICAL CENTER | Age: 53
End: 2023-09-11
Payer: COMMERCIAL

## 2023-09-11 DIAGNOSIS — E55.9 VITAMIN D DEFICIENCY: ICD-10-CM

## 2023-09-11 DIAGNOSIS — E29.1 SECONDARY MALE HYPOGONADISM: ICD-10-CM

## 2023-09-11 LAB
25(OH)D3 SERPL-MCNC: 32 NG/ML (ref 30–100)
ALBUMIN SERPL BCP-MCNC: 4 G/DL (ref 3.2–4.9)
ALBUMIN/GLOB SERPL: 1.5 G/DL
ALP SERPL-CCNC: 79 U/L (ref 30–99)
ALT SERPL-CCNC: 17 U/L (ref 2–50)
ANION GAP SERPL CALC-SCNC: 8 MMOL/L (ref 7–16)
AST SERPL-CCNC: 20 U/L (ref 12–45)
BILIRUB SERPL-MCNC: 0.4 MG/DL (ref 0.1–1.5)
BUN SERPL-MCNC: 14 MG/DL (ref 8–22)
CALCIUM ALBUM COR SERPL-MCNC: 9.1 MG/DL (ref 8.5–10.5)
CALCIUM SERPL-MCNC: 9.1 MG/DL (ref 8.5–10.5)
CHLORIDE SERPL-SCNC: 107 MMOL/L (ref 96–112)
CO2 SERPL-SCNC: 26 MMOL/L (ref 20–33)
CREAT SERPL-MCNC: 1.25 MG/DL (ref 0.5–1.4)
GFR SERPLBLD CREATININE-BSD FMLA CKD-EPI: 70 ML/MIN/1.73 M 2
GLOBULIN SER CALC-MCNC: 2.6 G/DL (ref 1.9–3.5)
GLUCOSE SERPL-MCNC: 90 MG/DL (ref 65–99)
HCT VFR BLD AUTO: 49.9 % (ref 42–52)
HGB BLD-MCNC: 16 G/DL (ref 14–18)
POTASSIUM SERPL-SCNC: 4.6 MMOL/L (ref 3.6–5.5)
PROT SERPL-MCNC: 6.6 G/DL (ref 6–8.2)
SODIUM SERPL-SCNC: 141 MMOL/L (ref 135–145)

## 2023-09-11 PROCEDURE — 84402 ASSAY OF FREE TESTOSTERONE: CPT

## 2023-09-11 PROCEDURE — 85018 HEMOGLOBIN: CPT

## 2023-09-11 PROCEDURE — 84270 ASSAY OF SEX HORMONE GLOBUL: CPT

## 2023-09-11 PROCEDURE — 84403 ASSAY OF TOTAL TESTOSTERONE: CPT

## 2023-09-11 PROCEDURE — 36415 COLL VENOUS BLD VENIPUNCTURE: CPT

## 2023-09-11 PROCEDURE — 80053 COMPREHEN METABOLIC PANEL: CPT

## 2023-09-11 PROCEDURE — 82306 VITAMIN D 25 HYDROXY: CPT

## 2023-09-11 PROCEDURE — 85014 HEMATOCRIT: CPT

## 2023-09-13 LAB
SHBG SERPL-SCNC: 27 NMOL/L (ref 19–76)
TESTOST FREE MFR SERPL: 2.2 % (ref 1.6–2.9)
TESTOST FREE SERPL-MCNC: 138 PG/ML (ref 47–244)
TESTOST SERPL-MCNC: 636 NG/DL (ref 300–890)

## 2023-09-26 ENCOUNTER — TELEPHONE (OUTPATIENT)
Dept: ENDOCRINOLOGY | Facility: MEDICAL CENTER | Age: 53
End: 2023-09-26
Payer: COMMERCIAL

## 2023-09-26 ENCOUNTER — APPOINTMENT (OUTPATIENT)
Dept: ENDOCRINOLOGY | Facility: MEDICAL CENTER | Age: 53
End: 2023-09-26
Payer: COMMERCIAL

## 2023-09-26 ENCOUNTER — TELEPHONE (OUTPATIENT)
Dept: ENDOCRINOLOGY | Facility: MEDICAL CENTER | Age: 53
End: 2023-09-26

## 2023-09-26 NOTE — TELEPHONE ENCOUNTER
Patient requested to have his testosterone medication refilled: Testostcyp 200 MG to the [facility] pharmacy in [place] (on file). Thank you!

## 2023-09-27 DIAGNOSIS — E29.1 HYPOGONADISM IN MALE: ICD-10-CM

## 2023-09-27 RX ORDER — TESTOSTERONE CYPIONATE 200 MG/ML
120 INJECTION, SOLUTION INTRAMUSCULAR ONCE
Qty: 6 ML | Refills: 1 | Status: SHIPPED | OUTPATIENT
Start: 2023-09-27 | End: 2023-09-27

## 2023-09-29 ENCOUNTER — TELEPHONE (OUTPATIENT)
Dept: ENDOCRINOLOGY | Facility: MEDICAL CENTER | Age: 53
End: 2023-09-29
Payer: COMMERCIAL

## 2023-09-29 NOTE — TELEPHONE ENCOUNTER
Prior Authorization for testosterone cypionate (Depo-Testosterone) 200mg/ml has been approved for a quantity of 6 , day supply 84    Prior Authorization reference number: [id number]    Insurance:[employer]     Effective dates: 09/29/23-09/29/24    Copay: $0     Is patient eligible to fill with [facility]? Yes    Pharmacy on file  [facility] [address] [place] [id number]   Phone:  [phone]    Fax:  [phone]     Next Steps:  Routing Approval to Pharmacy Coordinator pool       Approval letter

## 2023-09-29 NOTE — TELEPHONE ENCOUNTER
Received Renewal PA request via [employer]  for testosterone cypionate (Depo-Testosterone) 200mg/ml  (Quantity:6mls, Day Supply:84)     Insurance: [employer]  Member ID:[id number]  BIN: [id number]   PCN: [id number]  Group:[id number]     Ran Test claim via [place] & medication Rejects stating prior authorization is required.

## 2023-09-29 NOTE — TELEPHONE ENCOUNTER
Prior Authorization for testosterone cypionate (Depo-Testosterone) 200mg/ml (Quantity: 6mls, Days: 84) has been submitted via Cover My Meds: Key ([id number])    Insurance: [employer]    Attached chart notes & lab values and answered clinical questions     Will follow up in 24-48 business hours.

## 2023-10-17 ENCOUNTER — OFFICE VISIT (OUTPATIENT)
Dept: ENDOCRINOLOGY | Facility: MEDICAL CENTER | Age: 53
End: 2023-10-17
Payer: COMMERCIAL

## 2023-10-17 VITALS
BODY MASS INDEX: 36.2 KG/M2 | HEIGHT: 73 IN | WEIGHT: 273.1 LBS | DIASTOLIC BLOOD PRESSURE: 80 MMHG | OXYGEN SATURATION: 96 % | HEART RATE: 91 BPM | SYSTOLIC BLOOD PRESSURE: 120 MMHG

## 2023-10-17 DIAGNOSIS — R73.03 PREDIABETES: ICD-10-CM

## 2023-10-17 DIAGNOSIS — E29.1 SECONDARY MALE HYPOGONADISM: ICD-10-CM

## 2023-10-17 DIAGNOSIS — E78.49 OTHER HYPERLIPIDEMIA: ICD-10-CM

## 2023-10-17 DIAGNOSIS — E66.9 OBESITY (BMI 35.0-39.9 WITHOUT COMORBIDITY): ICD-10-CM

## 2023-10-17 DIAGNOSIS — E55.9 VITAMIN D DEFICIENCY: ICD-10-CM

## 2023-10-17 PROCEDURE — 3079F DIAST BP 80-89 MM HG: CPT

## 2023-10-17 PROCEDURE — 99214 OFFICE O/P EST MOD 30 MIN: CPT

## 2023-10-17 PROCEDURE — 99212 OFFICE O/P EST SF 10 MIN: CPT

## 2023-10-17 PROCEDURE — 3074F SYST BP LT 130 MM HG: CPT

## 2023-10-17 RX ORDER — TESTOSTERONE CYPIONATE 200 MG/ML
INJECTION, SOLUTION INTRAMUSCULAR
COMMUNITY
Start: 2023-10-02 | End: 2023-10-17 | Stop reason: SDUPTHER

## 2023-10-17 RX ORDER — TESTOSTERONE CYPIONATE 200 MG/ML
60 INJECTION, SOLUTION INTRAMUSCULAR
Qty: 6 ML | Refills: 1 | Status: SHIPPED | OUTPATIENT
Start: 2023-10-17 | End: 2024-01-23 | Stop reason: SDUPTHER

## 2023-10-17 NOTE — PROGRESS NOTES
"Chief Complaint: Follow up for the following conditions   HPI:   Miky Benavidez is a 53 y.o. male     Secondary Hypogonadism in Male:   Took testosterone for 1 year in 2020 after low testosterone levels in 2019,   Denies emotional, brain fogginess, muscle weakness,   Reports Erectyle dysfunction, decrease libido     At this time we are treating diagnoses #2 and #5 prior to beginning taking testosterone-tried and failed Monjaro  Reports needle phobia   Testosterone cypionate 120 mg (0.6ml) every 2 weeks sent to pharmacy     Latest Reference Range & Units 09/11/23 07:51   Hemoglobin 14.0 - 18.0 g/dL 16.0   Hematocrit 42.0 - 52.0 % 49.9     Did blood day before next injection   Latest Reference Range & Units 09/11/23 07:51   Free Testosterone 47 - 244 pg/mL 138   Sex Hormone Bind Globulin 19 - 76 nmol/L 27   Testosterone % Free 1.6 - 2.9 % 2.2   Testosterone,Total 300 - 890 ng/dL 636      Latest Reference Range & Units 09/11/23 07:51   GFR (CKD-EPI) >60 mL/min/1.73 m 2 70      Latest Reference Range & Units 06/05/23 07:58   PSA Total 0.0 - 4.0 ng/mL 1.5      Latest Reference Range & Units 06/05/23 07:58   TSH 0.380 - 5.330 uIU/mL 1.250   Free T-4 0.93 - 1.70 ng/dL 1.24       2.  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  Other hyperlipidemia:  Currently taking Crestor 10 mg daily  Follow-up PCP     Latest Reference Range & Units 03/06/23 06:46   Cholesterol,Tot 100 - 199 mg/dL 147   Triglycerides 0 - 149 mg/dL 145   HDL >=40 mg/dL 37 !   LDL <100 mg/dL 81       4.  " Vitamin D deficiency:  Currently taking 2000 IUs BID OTC daily vitamin D3   Latest Reference Range & Units 09/11/23 07:51   25-Hydroxy   Vitamin D 25 30 - 100 ng/mL 32       5.  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  High risk medication use:  On testosterone for hypogonadism treatment    7.  Controlled substance agreement signed:  Will be signed at next appointment    Patient's medications, allergies, and social histories were reviewed and updated as appropriate.    ROS:      CONS:     No fever, no chills, no weight loss, no fatigue   EYES:      No diplopia, no blurry vision, no redness of eyes, no swelling of eyelids   ENT:    No hearing loss, No ear pain, No sore throat, no dysphagia, no neck swelling   CV:     No chest pain, no palpitations, no claudication, no orthopnea, no PND   PULM:    No SOB, no cough, no hemoptysis, no wheezing    GI:   No nausea, no vomiting, no diarrhea, no constipation, no bloody stools   :  Passing urine well, no dysuria, no hematuria   ENDO:   No polyuria, no polydipsia, no heat intolerance, no cold intolerance   NEURO: No headaches, no dizziness, no convulsions, no tremors   MUSC:  No joint swellings, no arthralgias, no myalgias, no weakness   SKIN:   No rash, no ulcers, no dry skin   PSYCH:   No depression, no anxiety, no difficulty sleeping       Past Medical History:  Patient Active Problem List    Diagnosis Date Noted    Elevated coronary artery calcium score 06/26/2023    Mixed hyperlipidemia 05/28/2019    Influenza vaccination declined 11/21/2018    Obesity (BMI 30-39.9) 11/20/2018    Calculus of bile duct without mention of cholecystitis or obstruction 11/17/2016    History of biliary stent  "insertion 10/24/2016    Common wart 07/17/2015    Hypotestosteronism 07/14/2015    Family history of heart disease 07/09/2015    Erectile dysfunction 07/08/2015    SOB (shortness of breath) 07/08/2015    Vitamin d deficiency 08/05/2013    Intervertebral disc rupture 08/04/2013    Cauda equina compression (HCC) 08/04/2013       Past Surgical History:  Past Surgical History:   Procedure Laterality Date    ERCP W/ INSERTION STENT/TUBE N/A 11/17/2016    Procedure: ERCP W/ INSERTION STENT/TUBE - FOR STENT PLACEMENT/REMOVAL;  Surgeon: Andriy Somers M.D.;  Location: [facility];  Service:     ERCP W/REMOVAL CALCULUS N/A 11/17/2016    Procedure: ERCP W/REMOVAL CALCULUS;  Surgeon: Andriy Somers M.D.;  Location: [facility];  Service:     ERCP IN OR  10/7/2016    Procedure: Endoscopic retrogade cholangiopancreatography;  Surgeon: Andriy Somers M.D.;  Location: [facility];  Service:     SANDI BY LAPAROSCOPY  10/6/2016    Procedure: SANDI BY LAPAROSCOPY - WITH INTRAOPERATIVE CHOLANGIOGRAMS;  Surgeon: Keaton Parisi M.D.;  Location: [facility];  Service:     LUMBAR LAMINECTOMY DISKECTOMY  8/5/2013    Performed by Escobar Capps M.D. at [facility]    TONSILLECTOMY          Allergies:  Patient has no known allergies.     Current Medications:    Current Outpatient Medications:     metoprolol SR (TOPROL XL) 25 MG TABLET SR 24 HR, Take 1/2 (one-half) tablet by mouth once daily, Disp: 45 Tablet, Rfl: 3    SYRINGE-NEEDLE, DISP, 3 ML (BD LUER-LOCK SYRINGE) 18G X 1-1/2\" 3 ML Misc, 1 Each every 14 days. Use to draw up testosterone, Disp: 30 Each, Rfl: 11    SYRINGE-NEEDLE, DISP, 3 ML (B-D 3CC LUER-LAKESHA SYR 22GX1\") 22G X 1\" 3 ML Misc, 1 Each every 14 days. Use for testosterone injection, Disp: 30 Each, Rfl: 11    vitamin D2, Ergocalciferol, (DRISDOL) 1.25 MG (99914 UT) Cap capsule, Take 1 Capsule by mouth every 7 days., Disp: 12 Capsule, Rfl: 0    sildenafil citrate " (VIAGRA) 100 MG tablet, Take 1 Tablet by mouth as needed for Erectile Dysfunction., Disp: 10 Tablet, Rfl: 3    rosuvastatin (CRESTOR) 20 MG Tab, Take 1 Tablet by mouth every evening., Disp: 90 Tablet, Rfl: 3    aspirin EC (ECOTRIN) 81 MG Tablet Delayed Response, Take 1 Tablet by mouth every day., Disp: 90 Tablet, Rfl: 3    Cholecalciferol (VITAMIN D) 2000 UNIT Tab, Take 1 Tablet by mouth every day., Disp: , Rfl:     Social History:  Social History     Socioeconomic History    Marital status:      Spouse name: Not on file    Number of children: Not on file    Years of education: Not on file    Highest education level: Not on file   Occupational History    Not on file   Tobacco Use    Smoking status: Never    Smokeless tobacco: Never   Vaping Use    Vaping Use: Never used   Substance and Sexual Activity    Alcohol use: Not Currently     Comment: occ    Drug use: No    Sexual activity: Yes     Partners: Female   Other Topics Concern    Not on file   Social History Narrative    Not on file     Social Determinants of Health     Financial Resource Strain: Not on file   Food Insecurity: Not on file   Transportation Needs: Not on file   Physical Activity: Not on file   Stress: Not on file   Social Connections: Not on file   Intimate Partner Violence: Not on file   Housing Stability: Not on file        Family History:   Family History   Problem Relation Age of Onset    Diabetes Mother     Diabetes Father     Heart Disease Father          PHYSICAL EXAM:   Vital signs:   Vitals:    10/17/23 0939   BP: 120/80   Pulse: 91   SpO2: 96%        GENERAL: Well-developed, well-nourished  in no apparent distress.   LYMPH: No cervical, supraclavicular,  adenopathy palpated.   SKIN: No rashes, lesions. Turgor is normal.    Labs:    Lab Results   Component Value Date/Time    TSHULTRASEN 1.460 10/31/2020 0830     No results found for: FREEDIR  Lab Results   Component Value Date/Time    FREET3 3.56 10/31/2020 0830     No results found  for: THYSTIMIG    No results found for: LH    Lab Results   Component Value Date/Time    FSH <0.3 (L) 10/31/2020 0830       Lab Results   Component Value Date/Time    TESTOSTERONE 250 11/01/2022 0853           Imaging:      ASSESSMENT/PLAN:   1. Secondary male hypogonadism  Improving   Patient is agreeable to continue of his testosterone replacement with Testosterone cypionate 0.3ml weekly   Discussed risks and benefits of too much testosterone replacement  Controlled substance agreement signed  Discussed when to take injection      - testosterone cypionate (DEPO-TESTOSTERONE) 200 MG/ML injection; Inject 0.3 mL into the shoulder, thigh, or buttocks every 7 days for 90 days.  Dispense: 6 mL; Refill: 1  - HEMOGLOBIN AND HEMATOCRIT; Future  - Testosterone, Free & Total, Adult Male (w/SHBG); Future  - Comp Metabolic Panel; Future    2. Obesity (BMI 35.0-39.9 without comorbidity)  Unstable  Continue regimen-HPI   - Exercise for least 150 minutes/week  - Stable hydration  - Maintain a healthy diet, minimal carbohydrate, portion control     3. Other hyperlipidemia  Unstable  Continue regimen-HPI     4. Vitamin D deficiency  - Stable  -Continue regimen-see HPI  - VITAMIN D,25 HYDROXY (DEFICIENCY); Future    5. Prediabetes  - Stable  -Continue regimen-see HPI  - Exercise for least 150 minutes/week  - Stable hydration  - Daily feet check  - Maintain a healthy diet, minimal carbohydrate, portion control   - POCT Hemoglobin A1C  - C-PEPTIDE; Future  - TUAN-65; Future  - IA-2 AUTOANTIBODIES     Disposition: Make an appointment to follow-up in 6 months  Do your blood work 2 weeks prior to next appointment        Thank you kindly for allowing me to participate in the endocrine care plan for this patient.    [email or web address]  10/17/23        CC:   Rosa Riddle M.D.

## 2023-12-11 ENCOUNTER — APPOINTMENT (OUTPATIENT)
Dept: CARDIOLOGY | Facility: MEDICAL CENTER | Age: 53
End: 2023-12-11
Attending: NURSE PRACTITIONER
Payer: COMMERCIAL

## 2023-12-28 ENCOUNTER — OFFICE VISIT (OUTPATIENT)
Dept: URGENT CARE | Facility: PHYSICIAN GROUP | Age: 53
End: 2023-12-28

## 2023-12-28 VITALS
RESPIRATION RATE: 16 BRPM | TEMPERATURE: 97.8 F | OXYGEN SATURATION: 98 % | WEIGHT: 276 LBS | SYSTOLIC BLOOD PRESSURE: 122 MMHG | DIASTOLIC BLOOD PRESSURE: 86 MMHG | HEART RATE: 84 BPM | HEIGHT: 73 IN | BODY MASS INDEX: 36.58 KG/M2

## 2023-12-28 DIAGNOSIS — Z02.4 ENCOUNTER FOR COMMERCIAL DRIVER MEDICAL EXAMINATION (CDME): ICD-10-CM

## 2023-12-28 PROCEDURE — 7100 PR DOT PHYSICAL: Performed by: STUDENT IN AN ORGANIZED HEALTH CARE EDUCATION/TRAINING PROGRAM

## 2023-12-28 NOTE — PROGRESS NOTES
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    REVIEW OF SYSTEMS  General: no fever or chills  GI: no nausea or vomiting  See HPI for further details.    PAST MEDICAL HISTORY  Patient Active Problem List    Diagnosis Date Noted    Elevated coronary artery calcium score 06/26/2023    Mixed hyperlipidemia 05/28/2019    Influenza vaccination declined 11/21/2018    Obesity (BMI 30-39.9) 11/20/2018    Calculus of bile duct without mention of cholecystitis or obstruction 11/17/2016    History of biliary stent insertion 10/24/2016    Common wart 07/17/2015    Hypotestosteronism 07/14/2015    Family history of heart disease 07/09/2015    Erectile dysfunction 07/08/2015    SOB (shortness of breath) 07/08/2015    Vitamin d deficiency 08/05/2013    Intervertebral disc rupture 08/04/2013    Cauda equina compression (HCC) 08/04/2013       SURGICAL HISTORY   has a past surgical history that includes tonsillectomy; lumbar laminectomy diskectomy (8/5/2013); felix by laparoscopy (10/6/2016); ercp in or (10/7/2016); ercp w/ insertion stent/tube (N/A, 11/17/2016); and ercp w/removal calculus (N/A, 11/17/2016).    ALLERGIES  No Known Allergies    CURRENT MEDICATIONS  Home Medications       Reviewed by Anthony Moreno D.O. (Physician) on 12/28/23 at 1522  Med List Status: <None>     Medication Last Dose Status   aspirin EC (ECOTRIN) 81 MG Tablet Delayed Response  Active   Cholecalciferol (VITAMIN D) 2000 UNIT Tab  Active   metoprolol SR (TOPROL XL) 25 MG TABLET SR 24 HR Taking Active   rosuvastatin (CRESTOR) 20 MG Tab Taking Active   SYRINGE-NEEDLE, DISP, 3 ML (B-D 3CC LUER-LAKESHA SYR 22GX1\") 22G X 1\" 3 ML Misc  Active   SYRINGE-NEEDLE, DISP, 3 ML (BD LUER-LOCK SYRINGE) 18G X 1-1/2\" 3 ML Misc  Active   testosterone cypionate (DEPO-TESTOSTERONE) 200 MG/ML injection Taking Active              " "      SOCIAL HISTORY  Social History     Tobacco Use    Smoking status: Never    Smokeless tobacco: Never   Vaping Use    Vaping Use: Never used   Substance and Sexual Activity    Alcohol use: Not Currently     Comment: occ    Drug use: No    Sexual activity: Yes     Partners: Female       FAMILY HISTORY  Family History   Problem Relation Age of Onset    Diabetes Mother     Diabetes Father     Heart Disease Father           Objective:   PHYSICAL EXAM  VITAL SIGNS: /86   Pulse 84   Temp 36.6 °C (97.8 °F) (Temporal)   Resp 16   Ht 1.854 m (6' 1\")   Wt (!) 125 kg (276 lb)   SpO2 98%   BMI 36.41 kg/m²     Gen: no acute distress, normal voice  Skin: dry, intact, moist mucosal membranes  ENT: TMs clear intact bilaterally without erythema, bulging or effusion. No pharyngeal erythema or exudates. Uvula midline.  Eye: EOMI, PERRLA  Lungs: CTAB w/ symmetric expansion  CV: RRR w/o murmurs or clicks  Abdomen: Soft, no TTP, rebound or guarding  MSK: No gross abnormalities. Full range of motion.  Psych: normal affect, normal judgement, alert, awake    Assessment/Plan:     1. Encounter for  medical examination (CDME)          Re-certification x 2 years.      Differential diagnosis, natural history, supportive care, and indications for immediate follow-up discussed. All questions answered. Patient agrees with the plan of care.    Follow-up as needed if symptoms worsen or fail to improve to PCP, Urgent care or Emergency Room.    Please note that this dictation was created using voice recognition software. I have made a reasonable attempt to correct obvious errors, but I expect that there are errors of grammar and possibly content that I did not discover before finalizing the note.           "

## 2024-01-03 ENCOUNTER — RESEARCH ENCOUNTER (OUTPATIENT)
Dept: CARDIOLOGY | Facility: MEDICAL CENTER | Age: 54
End: 2024-01-03
Attending: NURSE PRACTITIONER
Payer: COMMERCIAL

## 2024-01-03 VITALS
SYSTOLIC BLOOD PRESSURE: 129 MMHG | WEIGHT: 277.78 LBS | DIASTOLIC BLOOD PRESSURE: 94 MMHG | BODY MASS INDEX: 36.65 KG/M2 | HEART RATE: 71 BPM

## 2024-01-03 VITALS
HEIGHT: 73 IN | HEART RATE: 75 BPM | SYSTOLIC BLOOD PRESSURE: 110 MMHG | RESPIRATION RATE: 16 BRPM | WEIGHT: 278 LBS | BODY MASS INDEX: 36.84 KG/M2 | OXYGEN SATURATION: 94 % | DIASTOLIC BLOOD PRESSURE: 80 MMHG

## 2024-01-03 DIAGNOSIS — Z00.6 RESEARCH STUDY PATIENT: ICD-10-CM

## 2024-01-03 DIAGNOSIS — Z82.49 FAMILY HISTORY OF HEART ATTACK: ICD-10-CM

## 2024-01-03 DIAGNOSIS — Z82.49 FAMILY HISTORY OF HEART DISEASE: ICD-10-CM

## 2024-01-03 DIAGNOSIS — E66.9 OBESITY (BMI 30-39.9): ICD-10-CM

## 2024-01-03 DIAGNOSIS — R06.02 SHORTNESS OF BREATH: ICD-10-CM

## 2024-01-03 DIAGNOSIS — E78.2 MIXED HYPERLIPIDEMIA: ICD-10-CM

## 2024-01-03 DIAGNOSIS — R94.39 ABNORMAL STRESS TEST: ICD-10-CM

## 2024-01-03 DIAGNOSIS — R93.1 ELEVATED CORONARY ARTERY CALCIUM SCORE: ICD-10-CM

## 2024-01-03 PROCEDURE — 3079F DIAST BP 80-89 MM HG: CPT | Performed by: NURSE PRACTITIONER

## 2024-01-03 PROCEDURE — 99212 OFFICE O/P EST SF 10 MIN: CPT | Performed by: NURSE PRACTITIONER

## 2024-01-03 PROCEDURE — 99214 OFFICE O/P EST MOD 30 MIN: CPT | Performed by: NURSE PRACTITIONER

## 2024-01-03 PROCEDURE — 3074F SYST BP LT 130 MM HG: CPT | Performed by: NURSE PRACTITIONER

## 2024-01-03 RX ORDER — OMEGA-3 FATTY ACIDS/FISH OIL 300-1000MG
CAPSULE ORAL
COMMUNITY
End: 2024-01-23

## 2024-01-03 RX ORDER — ASPIRIN 81 MG/1
81 TABLET ORAL DAILY
Qty: 100 TABLET | Refills: 3 | Status: SHIPPED | OUTPATIENT
Start: 2024-01-03

## 2024-01-03 RX ORDER — ROSUVASTATIN CALCIUM 20 MG/1
20 TABLET, COATED ORAL EVERY EVENING
Qty: 90 TABLET | Refills: 3 | Status: SHIPPED | OUTPATIENT
Start: 2024-01-03

## 2024-01-03 RX ORDER — METOPROLOL SUCCINATE 25 MG/1
12.5 TABLET, EXTENDED RELEASE ORAL DAILY
Qty: 45 TABLET | Refills: 3 | Status: SHIPPED | OUTPATIENT
Start: 2024-01-03

## 2024-01-03 ASSESSMENT — ENCOUNTER SYMPTOMS
FEVER: 0
COUGH: 0
PALPITATIONS: 0
FALLS: 0
WEIGHT LOSS: 0
DIZZINESS: 0
TINGLING: 0
MYALGIAS: 0
VOMITING: 0
ABDOMINAL PAIN: 0
BLOOD IN STOOL: 0
LOSS OF CONSCIOUSNESS: 0
NAUSEA: 0
PND: 0
ORTHOPNEA: 0
SHORTNESS OF BREATH: 0
BLURRED VISION: 0
CLAUDICATION: 0
BRUISES/BLEEDS EASILY: 0

## 2024-01-03 NOTE — Clinical Note
Patient is participating in clinic trial, [place] [facility], please do not check lipids during clinical trial

## 2024-01-03 NOTE — PROGRESS NOTES
Chief Complaint   Patient presents with    Shortness of Breath    Hyperlipidemia       Subjective     Miky Benavidez is a 51 y.o. male who presents today for cardiac follow-up research trial of Victorian plaque regarding hyperlipidemia, coronary artery calcium on CT, and premature family history of heart disease.  Kindly referred by Dr. Riddle.  In addition, patient is on medical problems of BMI 30-39, Hx Covid PNA (10/2021), erectile dysfunction, and vitamin D deficiency.  Patient was last seen by myself on 6/26/2023 for second injection.    Cardiovascular Risk Factors:  1. Smoking status: Denies  2. Type II Diabetes Mellitus: A1C 5.3  3. Hypertension:  Present on exam today  4. Dyslipidemia: present  5. Family history of early Coronary Artery Disease in a first degree relative (Male less than 55 years of age; Female less than 65 years of age): Present.  Father at the age of 52  6.  Obesity and/or Metabolic Syndrome: Present  7. Sedentary lifestyle: Present; physically active on weekends  8. Substance Abuse (ETOH, Caffine, Recreational substances): Hx high caffeine use, 5 hour energy when working nights (1-2x/week)  9. Hx of CKD or autoimmune diseases (lupus or RA): denies    Today, Patient feels well, denies chest pain, shortness of breath, palpitations, dizziness/lightheadedness, orthopnea, PND or Edema.Activity has decreased with the winter months. Patient being followed by endocrinology for low testosterone.  Patient denies exacerbation of symptoms with increased physical activity.  Patient encouraged on positive efforts to increase cardiovascular physical activity.    Past Medical History:   Diagnosis Date    Hepatic steatosis     Hypogonadism in male     Vitamin D deficiency      Past Surgical History:   Procedure Laterality Date    ERCP W/ INSERTION STENT/TUBE N/A 11/17/2016    Procedure: ERCP W/ INSERTION STENT/TUBE - FOR STENT PLACEMENT/REMOVAL;  Surgeon: Andriy Somers M.D.;  Location: SURGERY [facility];  Service:     ERCP W/REMOVAL CALCULUS N/A 11/17/2016    Procedure: ERCP W/REMOVAL CALCULUS;  Surgeon: Andriy Somers M.D.;  Location: SURGERY [facility];  Service:     ERCP IN OR  10/7/2016    Procedure: Endoscopic retrogade cholangiopancreatography;  Surgeon: Andriy Somers M.D.;  Location: SURGERY [facility];  Service:     SANDI BY LAPAROSCOPY  10/6/2016    Procedure: SANDI BY LAPAROSCOPY - WITH INTRAOPERATIVE CHOLANGIOGRAMS;  Surgeon: Keaton Parisi M.D.;  Location: SURGERY [facility];  Service:     LUMBAR LAMINECTOMY DISKECTOMY  8/5/2013    Performed by Escobar Capps M.D. at SURGERY [facility]    TONSILLECTOMY       Family History   Problem Relation Age of Onset    Diabetes Mother     Diabetes Father     Heart Disease Father      Social History     Socioeconomic History    Marital status:      Spouse name: Not on file    Number of children: Not on file    Years of education: Not on file    Highest education level: Not on file   Occupational History    Not on file   Tobacco Use    Smoking status: Never    Smokeless tobacco: Never   Vaping Use    Vaping Use: Never used   Substance and Sexual Activity    Alcohol use: Not Currently     Comment: occ    Drug use: No    Sexual activity: Yes     Partners: Female   Other Topics Concern    Not on file   Social History Narrative    Not on file     Social Determinants of Health     Financial Resource Strain: Not on file   Food Insecurity: Not on file   Transportation Needs: Not on file   Physical Activity: Not on file   Stress: Not on file   Social Connections: Not on file   Intimate Partner Violence: Not on file   Housing Stability: Not on file     No Known Allergies  Outpatient Encounter Medications as of 1/3/2024   Medication Sig Dispense Refill    Ibuprofen (ADVIL) 200 MG Cap Take  by mouth.      testosterone cypionate (DEPO-TESTOSTERONE) 200 MG/ML injection Inject 0.3 mL into the shoulder, thigh, or buttocks every 7 days  "for 90 days. 6 mL 1    metoprolol SR (TOPROL XL) 25 MG TABLET SR 24 HR Take 1/2 (one-half) tablet by mouth once daily 45 Tablet 3    SYRINGE-NEEDLE, DISP, 3 ML (BD LUER-LOCK SYRINGE) 18G X 1-1/2\" 3 ML Misc 1 Each every 14 days. Use to draw up testosterone 30 Each 11    SYRINGE-NEEDLE, DISP, 3 ML (B-D 3CC LUER-LAKESHA SYR 22GX1\") 22G X 1\" 3 ML Misc 1 Each every 14 days. Use for testosterone injection 30 Each 11    rosuvastatin (CRESTOR) 20 MG Tab Take 1 Tablet by mouth every evening. 90 Tablet 3    aspirin EC (ECOTRIN) 81 MG Tablet Delayed Response Take 1 Tablet by mouth every day. 90 Tablet 3    Cholecalciferol (VITAMIN D) 2000 UNIT Tab Take 1 Tablet by mouth every day.       No facility-administered encounter medications on file as of 1/3/2024.     Review of Systems   Constitutional:  Negative for fever, malaise/fatigue and weight loss.   Eyes:  Negative for blurred vision.   Respiratory:  Negative for cough and shortness of breath.    Cardiovascular:  Negative for chest pain, palpitations, orthopnea, claudication, leg swelling and PND.   Gastrointestinal:  Negative for abdominal pain, blood in stool, nausea and vomiting.   Genitourinary:  Negative for dysuria, frequency and hematuria.   Musculoskeletal:  Negative for falls and myalgias.   Neurological:  Negative for dizziness, tingling and loss of consciousness.   Endo/Heme/Allergies:  Does not bruise/bleed easily.              Objective     /80 (BP Location: Left arm, Patient Position: Sitting, BP Cuff Size: Adult)   Pulse 75   Resp 16   Ht 1.854 m (6' 1\")   Wt (!) 126 kg (278 lb)   SpO2 94%   BMI 36.68 kg/m²     Physical Exam    Complete Physical Exam  GENERAL APPEARANCE: Appears healthy.  Alert; in no acute distress.  Pleasant.,   HEAD: Normocephalic. No masses, lesions, tenderness or abnormalities,   EYES: Anicteric, conjunctivae/corneas clear. PERRLA. EOMI. Fundi benign.,   EARS: negative,   NOSE: Nares normal. Septum midline. Mucosa normal. No " "drainage or sinus tenderness.,   THROAT: Lips, mucosa, and tongue normal. Teeth and gums normal. Oropharynx moist and without lesion ,   NECK: Neck supple. No adenopathy. Thyroid symmetric, normal size, and without nodularity,   BACK: negative,   LUNGS: Percussion normal. Good diaphragmatic excursion. Lungs clear to auscultation bilaterally,   CARDIOVASCULAR: PMI normal. No lifts, heaves, or thrills. RRR. No murmurs, clicks, gallops, or rubs,   ABDOMEN: Abdomen soft, non-tender. BS normal. No masses,  No organomegaly,   EXTREMITIES: negative,  PULSES: negative,   SKIN: negative,   LYMPH NODES: No palpable lymph nodes,   NEUROLOGIC: negative       Lab Results   Component Value Date/Time    SODIUM 141 09/11/2023 07:51 AM    POTASSIUM 4.6 09/11/2023 07:51 AM    CHLORIDE 107 09/11/2023 07:51 AM    CO2 26 09/11/2023 07:51 AM    GLUCOSE 90 09/11/2023 07:51 AM    BUN 14 09/11/2023 07:51 AM    CREATININE 1.25 09/11/2023 07:51 AM     Lab Results   Component Value Date/Time    WBC 10.1 11/11/2016 04:46 PM    RBC 5.00 11/11/2016 04:46 PM    HEMOGLOBIN 16.0 09/11/2023 07:51 AM    HEMATOCRIT 49.9 09/11/2023 07:51 AM    MCV 89.0 11/11/2016 04:46 PM    MCH 29.6 11/11/2016 04:46 PM    MCHC 33.3 (L) 11/11/2016 04:46 PM    MPV 9.9 11/11/2016 04:46 PM    NEUTSPOLYS 77.50 (H) 10/11/2016 12:13 AM    LYMPHOCYTES 12.40 (L) 10/11/2016 12:13 AM    MONOCYTES 7.60 10/11/2016 12:13 AM    EOSINOPHILS 1.70 10/11/2016 12:13 AM    BASOPHILS 0.30 10/11/2016 12:13 AM    HYPOCHROMIA 1+ 09/30/2013 08:13 AM      No results found for: \"BNPBTYPENAT\"   No results found for: \"CRPCARDIAC\"  Lab Results   Component Value Date/Time    CHOLSTRLTOT 147 03/06/2023 06:46 AM    LDL 81 03/06/2023 06:46 AM    HDL 37 (A) 03/06/2023 06:46 AM    TRIGLYCERIDE 145 03/06/2023 06:46 AM       Lab Results   Component Value Date/Time    PROTHROMBTM 12.6 08/04/2013 12:50 AM    INR 0.92 08/04/2013 12:50 AM     Lab Results   Component Value Date/Time    TROPONINI <0.01 10/05/2016 " 03:00 PM      Treadmill Stress (1/29/2019): Provider conclusions and analysis:Baseline ECG:Normal ECG   Stress ECG: No ischemic T wave changes with peak stress   Impression: Normal stress ECG     Coronary CT heart (10/24/2022):  FINDINGS:  Limitations: No significant bolus or motion limitations are present.  Coronary calcification:  LMA - 18.1  LCX - 0.0  LAD - 0.0  RCA - 0.0  Total Calcium Score: 18.1  Percentile: Calcium score is above the 50th percentile for the patient's age and sex.  Coronary CTA:  Dominance: Right dominant circulation.     Left Main: Large caliber vessel with a normal takeoff from the left coronary cusp that bifurcates to form a left anterior descending artery and a left circumflex artery. Small amount of calcified plaque distally without significant luminal reduction.     LAD and Diagonals: Patent. Small amount of noncalcified plaque at the midportion near the takeoff of 2nd diagonal, with less than 50% luminal reduction. Ramus intermedius is patent with no evidence of plaque or stenosis.     LCX and Obtuse Marginals: Patent. No plaque or stenosis.     RCA, Posterior Descending and Posterolateral Branches: Patent. No plaque or stenosis.     Cardiac Structure and Morphology:  Left atrium: Normal in size. No thrombus in the left atrial appendage.  Left ventricle: Normal in size. No stigmata of prior infarction. No abnormal filling defect.  Pericardium: Normal thickness. No pericardial effusion or calcification.  Cardiac valves: No thickening or calcifications in the aortic or mitral valves.  Aorta: No aneurysm of the visualized thoracic aorta.  3D angiographic/MIP images of the vasculature confirm the vascular findings as described above.  Extracardiac findings:  Lungs: Apparent pleural thickening at the RIGHT lung apex posteriorly, partially visualized.  Punctate calcified nodules in the LEFT lower lobe and lingula consistent with granulomas.  Mediastinum: No lymphadenopathy.  Upper abdomen:  Elevated LEFT hemidiaphragm.  Bones: Unremarkable.     IMPRESSION:     1.  Small noncalcified plaque in the distal LEFT main coronary artery without significant luminal reduction.  2.  Small amount of noncalcified plaque in the mid LAD without significant stenosis.  3.  No other evidence for significant coronary artery stenosis or occlusion.  4.  Apparent focal pleural thickening versus less likely pleural-based mass at the RIGHT lung apex posteriorly, partially visualized.  Consider follow-up CT chest.     Calcium Score of 1-99 AND <75th percentile    Heart CTA (3/21/2023):  Coronary calcification:  LMA - 18.1  LCX - 0.0  LAD - 0.0  RCA - 0.0     Total Calcium Score: 18.1     Percentile: Calcium score is below the 75th percentile for the patient's age and sex.     Coronary CTA:  Dominance: Right dominant circulation.     Left Main: Large caliber vessel with a normal takeoff from the left coronary cusp that bifurcates to form a left anterior descending artery and a left circumflex artery. Minimal calcified plaque in the distal left main results in no hemodynamically   significant stenosis.     LAD and Diagonals: Patent. Minimal noncalcified plaque in the mid LAD results in no hemodynamically significant stenosis. Patent ramus intermedius.     LCX and Obtuse Marginals: Patent. No plaque or stenosis.     RCA, Posterior Descending and Posterolateral Branches: Patent. No plaque or stenosis.     Cardiac Structure and Morphology:  Left atrium: Normal in size. No thrombus in the left atrial appendage.  Left ventricle: Normal in size. No stigmata of prior infarction. No abnormal filling defect.  Pericardium: Normal thickness. No pericardial effusion or calcification.  Cardiac valves: No thickening or calcifications in the aortic or mitral valves.  Aorta: A 4 cm ascending aortic aneurysm.     3D angiographic/MIP images of the vasculature confirm the vascular findings as described above.     Extracardiac findings:     Lungs:  A few tiny calcified granulomas.  Mediastinum: No lymphadenopathy.  Upper abdomen: Unremarkable.  Bones: Unremarkable.    Assessment & Plan     1. Research study patient        2. Elevated coronary artery calcium score        3. Mixed hyperlipidemia              Medical Decision Making: Today's Assessment/Status/Plan:        Victorion Plaque study visit    Nonobstructive CAD; hyperlipidemia; premature family history CAD; pre-DM; BMI 30-39; hypertension; ED  -Blood pressure well controlled in office today  -Mild atherosclerosis on coronary CTA  -Continue aspirin and rosuvastatin as tolerated  -Continue metoprolol XL 12.5 mg daily as tolerated  -Initial LDL 91. Prior to study. Blinded to re-evaluation    -Victorian Plaque with Inclisiran versus placebo with third injection today.    Follow-up per protocol.  Sooner as needed    Patient verbalizes understanding and agrees with the plan of care.     CANDELARIO Stern.   [facility]  [phone]    PLEASE NOTE: This Note was created using voice recognition Software. I have made every reasonable attempt to correct obvious errors, but I expect that there are errors of grammar and possibly content that I did not discover before finalizing the note

## 2024-01-03 NOTE — RESEARCH NOTE
Name: Miky Benavidez   MRN: [id number]  Participant ID:  [id number]  : 1970  Visit Date/Time: 1/3/2024 9:45 AM      Study:    [id number] - Victorion Plaque   Status Consented/Enrolled (3/6/2023)   Active Start Date 3/6/23   Participant ID [id number]   Coordinator Michelle Austin; Michelle Lino; Shaista Baca   IRB [id number]   NCT [id number]    Wilder Hollis M.D.     Study Note:      Miky here today for study 9 Month visit. He is feeling well with no AE's or med changes to report.    ANGEL Crow conducted physical exam today, see her note.     Study medication of Inclisiran or placebo Kit #[id number] injection given at 10:23am by Kiel Truong RN in left lateral abdomen.      Patient left feeling well and understands to call with any questions or AE's.    Vitals:  BP OMRON reading after sitting for 5 minutes, 1-2 min apart  Sitting quietly starting 9:59 am    Vitals:    24 1004 24 1006 24 1008   BP: (!) 126/96 (!) 128/96 (!) 129/94   BP Location: Left arm     Patient Position: Sitting     BP Cuff Size: Adult     Pulse: 68 73 71   Weight: (!) 126 kg (277 lb 12.5 oz)         Labs:                  Patient reports fasting for >10hours. Study labs drawn and processed today per protocol.   Refrigerated labs shipped today Tracking #[id number]; Frozen tracking #[id number]      Meds:      Current Outpatient Medications   Medication Sig Dispense Refill    Ibuprofen (ADVIL) 200 MG Cap Take  by mouth.      aspirin 81 MG EC tablet Take 1 Tablet by mouth every day. 100 Tablet 3    metoprolol SR (TOPROL XL) 25 MG TABLET SR 24 HR Take 0.5 Tablets by mouth every day. 45 Tablet 3    rosuvastatin (CRESTOR) 20 MG Tab Take 1 Tablet by mouth every evening. 90 Tablet 3    testosterone cypionate (DEPO-TESTOSTERONE) 200 MG/ML injection Inject 0.3 mL into the shoulder, thigh, or buttocks every 7 days for 90 days. 6 mL 1    SYRINGE-NEEDLE, DISP, 3 ML (BD LUER-LOCK SYRINGE)  "18G X 1-1/2\" 3 ML Misc 1 Each every 14 days. Use to draw up testosterone 30 Each 11    SYRINGE-NEEDLE, DISP, 3 ML (B-D 3CC LUER-LAKESHA SYR 22GX1\") 22G X 1\" 3 ML Misc 1 Each every 14 days. Use for testosterone injection 30 Each 11    Cholecalciferol (VITAMIN D) 2000 UNIT Tab Take 1 Tablet by mouth every day.       Current Facility-Administered Medications   Medication Dose Route Frequency Provider Last Rate Last Admin    inclisiran sodium or PLACEBO (Study Drug) injection 300 mg  300 mg Subcutaneous Once CANDELARIO Stern.        current meds        Follow-up: Month 15 visit 6/24/24    "

## 2024-01-23 ENCOUNTER — OFFICE VISIT (OUTPATIENT)
Dept: MEDICAL GROUP | Facility: PHYSICIAN GROUP | Age: 54
End: 2024-01-23
Payer: COMMERCIAL

## 2024-01-23 VITALS
TEMPERATURE: 97 F | HEIGHT: 73 IN | HEART RATE: 84 BPM | RESPIRATION RATE: 14 BRPM | OXYGEN SATURATION: 96 % | BODY MASS INDEX: 36.58 KG/M2 | WEIGHT: 276 LBS | SYSTOLIC BLOOD PRESSURE: 122 MMHG | DIASTOLIC BLOOD PRESSURE: 80 MMHG

## 2024-01-23 DIAGNOSIS — Z12.5 SCREENING FOR MALIGNANT NEOPLASM OF PROSTATE: ICD-10-CM

## 2024-01-23 DIAGNOSIS — E29.1 SECONDARY MALE HYPOGONADISM: ICD-10-CM

## 2024-01-23 DIAGNOSIS — Z12.11 SCREENING FOR COLORECTAL CANCER: ICD-10-CM

## 2024-01-23 DIAGNOSIS — Z11.4 ENCOUNTER FOR SCREENING FOR HIV: ICD-10-CM

## 2024-01-23 DIAGNOSIS — Z12.12 SCREENING FOR COLORECTAL CANCER: ICD-10-CM

## 2024-01-23 DIAGNOSIS — R93.1 ELEVATED CORONARY ARTERY CALCIUM SCORE: ICD-10-CM

## 2024-01-23 DIAGNOSIS — N52.9 ERECTILE DYSFUNCTION, UNSPECIFIED ERECTILE DYSFUNCTION TYPE: ICD-10-CM

## 2024-01-23 DIAGNOSIS — Z23 NEED FOR VACCINATION: ICD-10-CM

## 2024-01-23 PROCEDURE — 90746 HEPB VACCINE 3 DOSE ADULT IM: CPT | Performed by: FAMILY MEDICINE

## 2024-01-23 PROCEDURE — 90471 IMMUNIZATION ADMIN: CPT | Performed by: FAMILY MEDICINE

## 2024-01-23 PROCEDURE — 3079F DIAST BP 80-89 MM HG: CPT | Performed by: FAMILY MEDICINE

## 2024-01-23 PROCEDURE — 99214 OFFICE O/P EST MOD 30 MIN: CPT | Mod: 25 | Performed by: FAMILY MEDICINE

## 2024-01-23 PROCEDURE — 3074F SYST BP LT 130 MM HG: CPT | Performed by: FAMILY MEDICINE

## 2024-01-23 RX ORDER — SILDENAFIL 100 MG/1
100 TABLET, FILM COATED ORAL
Qty: 10 TABLET | Refills: 3 | Status: SHIPPED | OUTPATIENT
Start: 2024-01-23

## 2024-01-23 RX ORDER — TESTOSTERONE CYPIONATE 200 MG/ML
0.3 VIAL (ML) INTRAMUSCULAR
COMMUNITY
Start: 2023-05-15

## 2024-01-23 ASSESSMENT — PATIENT HEALTH QUESTIONNAIRE - PHQ9: CLINICAL INTERPRETATION OF PHQ2 SCORE: 0

## 2024-01-24 RX ORDER — TESTOSTERONE CYPIONATE 200 MG/ML
120 INJECTION, SOLUTION INTRAMUSCULAR
Qty: 6 ML | Refills: 1 | Status: SHIPPED | OUTPATIENT
Start: 2024-01-24 | End: 2024-04-23

## 2024-01-24 RX ORDER — TESTOSTERONE CYPIONATE 200 MG/ML
60 INJECTION, SOLUTION INTRAMUSCULAR
Qty: 6 ML | Refills: 1 | Status: SHIPPED | OUTPATIENT
Start: 2024-01-24 | End: 2024-04-23

## 2024-01-24 NOTE — PROGRESS NOTES
Subjective:   Miky Benavidez is a 53 y.o. male here today for evaluation and management of:     Erectile dysfunction  rx for viaga requested.   ED due to venous and arterial etiology as he has dyslipidemia, elevated calcium score.   He has hypotestosteronism being treated with IM testosterone injections. Has normal H/H  New rx provided.     Elevated coronary artery calcium score  Lab Results   Component Value Date/Time    CHOLSTRLTOT 147 03/06/2023 06:46 AM    LDL 81 03/06/2023 06:46 AM    HDL 37 (A) 03/06/2023 06:46 AM    TRIGLYCERIDE 145 03/06/2023 06:46 AM       Lab Results   Component Value Date/Time    SODIUM 141 09/11/2023 07:51 AM    POTASSIUM 4.6 09/11/2023 07:51 AM    CHLORIDE 107 09/11/2023 07:51 AM    CO2 26 09/11/2023 07:51 AM    GLUCOSE 90 09/11/2023 07:51 AM    BUN 14 09/11/2023 07:51 AM    CREATININE 1.25 09/11/2023 07:51 AM     Lab Results   Component Value Date/Time    ALKPHOSPHAT 79 09/11/2023 07:51 AM    ASTSGOT 20 09/11/2023 07:51 AM    ALTSGPT 17 09/11/2023 07:51 AM    TBILIRUBIN 0.4 09/11/2023 07:51 AM      Continue asa 81 mg, rosuvastatin 20 mg  Continue healthy diet low in saturated fats.              Current medicines (including changes today)  Current Outpatient Medications   Medication Sig Dispense Refill    Testosterone Cypionate 200 MG/ML Solution Inject 0.3 mL into the shoulder, thigh, or buttocks every 7 days.      Zoster Vac Recomb Adjuvanted (SHINGRIX) 50 MCG/0.5ML Recon Susp Inject 0.5 mL into the shoulder, thigh, or buttocks one time for 1 dose. 0.5 mL 0    sildenafil citrate (VIAGRA) 100 MG tablet Take 1 Tablet by mouth 1 time a day as needed for Erectile Dysfunction. 10 Tablet 3    aspirin 81 MG EC tablet Take 1 Tablet by mouth every day. 100 Tablet 3    metoprolol SR (TOPROL XL) 25 MG TABLET SR 24 HR Take 0.5 Tablets by mouth every day. 45 Tablet 3    rosuvastatin (CRESTOR) 20 MG Tab Take 1 Tablet by mouth every evening. 90 Tablet 3    SYRINGE-NEEDLE, DISP, 3 ML (BD  "LUER-LOCK SYRINGE) 18G X 1-1/2\" 3 ML Misc 1 Each every 14 days. Use to draw up testosterone 30 Each 11    SYRINGE-NEEDLE, DISP, 3 ML (B-D 3CC LUER-LAKESHA SYR 22GX1\") 22G X 1\" 3 ML Misc 1 Each every 14 days. Use for testosterone injection 30 Each 11    Cholecalciferol (VITAMIN D) 2000 UNIT Tab Take 1 Tablet by mouth every day.       No current facility-administered medications for this visit.     He  has a past medical history of Hepatic steatosis, Hypogonadism in male, and Vitamin D deficiency.    ROS  No chest pain, no shortness of breath, no abdominal pain       Objective:     /80 (BP Location: Left arm, Patient Position: Sitting, BP Cuff Size: Adult long)   Pulse 84   Temp 36.1 °C (97 °F) (Temporal)   Resp 14   Ht 1.854 m (6' 1\")   Wt (!) 125 kg (276 lb)   SpO2 96%  Body mass index is 36.41 kg/m².   Physical Exam:  Constitutional: Alert, no distress.  Skin: Warm, dry, good turgor, no rashes in visible areas.  Eye: Equal, round and reactive, conjunctiva clear, lids normal.  ENMT: Lips without lesions, good dentition, oropharynx clear.  Neck: Trachea midline, no masses, no thyromegaly. No cervical or supraclavicular lymphadenopathy  Respiratory: Unlabored respiratory effort, lungs clear to auscultation, no wheezes, no ronchi.  Cardiovascular: Normal S1, S2, no murmur, no edema.  Abdomen: Soft, non-tender, no masses, no hepatosplenomegaly.  Psych: Alert and oriented x3, normal affect and mood.        Assessment and Plan:   The following treatment plan was discussed    1. Need for vaccination  - Zoster Vac Recomb Adjuvanted (SHINGRIX) 50 MCG/0.5ML Recon Susp; Inject 0.5 mL into the shoulder, thigh, or buttocks one time for 1 dose.  Dispense: 0.5 mL; Refill: 0  - Hepatitis B Vaccine Adult 20+    2. Screening for malignant neoplasm of prostate  - PROSTATE SPECIFIC AG SCREENING; Future    3. Screening for colorectal cancer  - Referral to GI for Colonoscopy    4. Encounter for screening for HIV  - HIV AG/AB " COMBO ASSAY SCREENING; Future    5. Erectile dysfunction, unspecified erectile dysfunction type    6. Elevated coronary artery calcium score    Other orders  - Testosterone Cypionate 200 MG/ML Solution; Inject 0.3 mL into the shoulder, thigh, or buttocks every 7 days.  - sildenafil citrate (VIAGRA) 100 MG tablet; Take 1 Tablet by mouth 1 time a day as needed for Erectile Dysfunction.  Dispense: 10 Tablet; Refill: 3      Followup: Return in about 6 months (around 7/23/2024) for follow up.

## 2024-01-24 NOTE — ASSESSMENT & PLAN NOTE
rx for viaga requested.   ED due to venous and arterial etiology as he has dyslipidemia, elevated calcium score.   He has hypotestosteronism being treated with IM testosterone injections. Has normal H/H  New rx provided.

## 2024-01-24 NOTE — ASSESSMENT & PLAN NOTE
Lab Results   Component Value Date/Time    CHOLSTRLTOT 147 03/06/2023 06:46 AM    LDL 81 03/06/2023 06:46 AM    HDL 37 (A) 03/06/2023 06:46 AM    TRIGLYCERIDE 145 03/06/2023 06:46 AM       Lab Results   Component Value Date/Time    SODIUM 141 09/11/2023 07:51 AM    POTASSIUM 4.6 09/11/2023 07:51 AM    CHLORIDE 107 09/11/2023 07:51 AM    CO2 26 09/11/2023 07:51 AM    GLUCOSE 90 09/11/2023 07:51 AM    BUN 14 09/11/2023 07:51 AM    CREATININE 1.25 09/11/2023 07:51 AM     Lab Results   Component Value Date/Time    ALKPHOSPHAT 79 09/11/2023 07:51 AM    ASTSGOT 20 09/11/2023 07:51 AM    ALTSGPT 17 09/11/2023 07:51 AM    TBILIRUBIN 0.4 09/11/2023 07:51 AM      Continue asa 81 mg, rosuvastatin 20 mg  Continue healthy diet low in saturated fats.

## 2024-03-11 ENCOUNTER — HOSPITAL ENCOUNTER (OUTPATIENT)
Dept: LAB | Facility: MEDICAL CENTER | Age: 54
End: 2024-03-11
Payer: COMMERCIAL

## 2024-03-11 ENCOUNTER — HOSPITAL ENCOUNTER (OUTPATIENT)
Facility: MEDICAL CENTER | Age: 54
End: 2024-03-11
Attending: FAMILY MEDICINE
Payer: COMMERCIAL

## 2024-03-11 DIAGNOSIS — Z11.4 ENCOUNTER FOR SCREENING FOR HIV: ICD-10-CM

## 2024-03-11 DIAGNOSIS — E29.1 SECONDARY MALE HYPOGONADISM: ICD-10-CM

## 2024-03-11 DIAGNOSIS — R73.03 PREDIABETES: ICD-10-CM

## 2024-03-11 DIAGNOSIS — Z12.5 SCREENING FOR MALIGNANT NEOPLASM OF PROSTATE: ICD-10-CM

## 2024-03-11 DIAGNOSIS — E55.9 VITAMIN D DEFICIENCY: ICD-10-CM

## 2024-03-11 LAB
25(OH)D3 SERPL-MCNC: 26 NG/ML (ref 30–100)
ALBUMIN SERPL BCP-MCNC: 4.2 G/DL (ref 3.2–4.9)
ALBUMIN/GLOB SERPL: 1.5 G/DL
ALP SERPL-CCNC: 74 U/L (ref 30–99)
ALT SERPL-CCNC: 13 U/L (ref 2–50)
ANION GAP SERPL CALC-SCNC: 11 MMOL/L (ref 7–16)
AST SERPL-CCNC: 19 U/L (ref 12–45)
BILIRUB SERPL-MCNC: 0.6 MG/DL (ref 0.1–1.5)
BUN SERPL-MCNC: 13 MG/DL (ref 8–22)
CALCIUM ALBUM COR SERPL-MCNC: 8.6 MG/DL (ref 8.5–10.5)
CALCIUM SERPL-MCNC: 8.8 MG/DL (ref 8.5–10.5)
CHLORIDE SERPL-SCNC: 103 MMOL/L (ref 96–112)
CO2 SERPL-SCNC: 23 MMOL/L (ref 20–33)
CREAT SERPL-MCNC: 1.25 MG/DL (ref 0.5–1.4)
GFR SERPLBLD CREATININE-BSD FMLA CKD-EPI: 69 ML/MIN/1.73 M 2
GLOBULIN SER CALC-MCNC: 2.8 G/DL (ref 1.9–3.5)
GLUCOSE SERPL-MCNC: 100 MG/DL (ref 65–99)
HCT VFR BLD AUTO: 52.5 % (ref 42–52)
HGB BLD-MCNC: 17.4 G/DL (ref 14–18)
HIV 1+2 AB+HIV1 P24 AG SERPL QL IA: NORMAL
POTASSIUM SERPL-SCNC: 4.3 MMOL/L (ref 3.6–5.5)
PROT SERPL-MCNC: 7 G/DL (ref 6–8.2)
PSA SERPL-MCNC: 2.2 NG/ML (ref 0–4)
SODIUM SERPL-SCNC: 137 MMOL/L (ref 135–145)

## 2024-03-11 PROCEDURE — 84402 ASSAY OF FREE TESTOSTERONE: CPT

## 2024-03-11 PROCEDURE — 80053 COMPREHEN METABOLIC PANEL: CPT

## 2024-03-11 PROCEDURE — 84153 ASSAY OF PSA TOTAL: CPT

## 2024-03-11 PROCEDURE — 86341 ISLET CELL ANTIBODY: CPT

## 2024-03-11 PROCEDURE — 84270 ASSAY OF SEX HORMONE GLOBUL: CPT

## 2024-03-11 PROCEDURE — 85014 HEMATOCRIT: CPT

## 2024-03-11 PROCEDURE — 82306 VITAMIN D 25 HYDROXY: CPT

## 2024-03-11 PROCEDURE — 84403 ASSAY OF TOTAL TESTOSTERONE: CPT

## 2024-03-11 PROCEDURE — 85018 HEMOGLOBIN: CPT

## 2024-03-11 PROCEDURE — 87389 HIV-1 AG W/HIV-1&-2 AB AG IA: CPT

## 2024-03-11 PROCEDURE — 84681 ASSAY OF C-PEPTIDE: CPT

## 2024-03-11 PROCEDURE — 36415 COLL VENOUS BLD VENIPUNCTURE: CPT

## 2024-03-13 LAB
C PEPTIDE SERPL-MCNC: 2.7 NG/ML (ref 0.5–3.3)
SHBG SERPL-SCNC: 22 NMOL/L (ref 19–76)
TESTOST FREE MFR SERPL: 2.4 % (ref 1.6–2.9)
TESTOST FREE SERPL-MCNC: 189 PG/ML (ref 47–244)
TESTOST SERPL-MCNC: 780 NG/DL (ref 300–890)

## 2024-03-14 LAB
GAD65 AB SER IA-ACNC: <5 IU/ML (ref 0–5)
ISLET CELL512 AB SER IA-ACNC: <5.4 U/ML (ref 0–7.4)

## 2024-04-16 ENCOUNTER — OFFICE VISIT (OUTPATIENT)
Dept: ENDOCRINOLOGY | Facility: MEDICAL CENTER | Age: 54
End: 2024-04-16
Payer: COMMERCIAL

## 2024-04-16 VITALS
HEART RATE: 82 BPM | BODY MASS INDEX: 37.11 KG/M2 | SYSTOLIC BLOOD PRESSURE: 119 MMHG | HEIGHT: 73 IN | OXYGEN SATURATION: 93 % | DIASTOLIC BLOOD PRESSURE: 89 MMHG | WEIGHT: 280 LBS

## 2024-04-16 DIAGNOSIS — R73.03 PREDIABETES: ICD-10-CM

## 2024-04-16 DIAGNOSIS — E55.9 VITAMIN D DEFICIENCY: ICD-10-CM

## 2024-04-16 DIAGNOSIS — E66.9 OBESITY (BMI 35.0-39.9 WITHOUT COMORBIDITY): ICD-10-CM

## 2024-04-16 DIAGNOSIS — E78.49 OTHER HYPERLIPIDEMIA: ICD-10-CM

## 2024-04-16 DIAGNOSIS — Z79.899 HIGH RISK MEDICATION USE: ICD-10-CM

## 2024-04-16 DIAGNOSIS — Z79.899 CONTROLLED SUBSTANCE AGREEMENT SIGNED: ICD-10-CM

## 2024-04-16 DIAGNOSIS — E29.1 SECONDARY MALE HYPOGONADISM: ICD-10-CM

## 2024-04-16 PROCEDURE — 3079F DIAST BP 80-89 MM HG: CPT

## 2024-04-16 PROCEDURE — 99214 OFFICE O/P EST MOD 30 MIN: CPT

## 2024-04-16 PROCEDURE — 99211 OFF/OP EST MAY X REQ PHY/QHP: CPT

## 2024-04-16 PROCEDURE — 3074F SYST BP LT 130 MM HG: CPT

## 2024-04-16 RX ORDER — TESTOSTERONE CYPIONATE 200 MG/ML
60 INJECTION, SOLUTION INTRAMUSCULAR
Qty: 6 ML | Refills: 1 | Status: SHIPPED | OUTPATIENT
Start: 2024-04-16 | End: 2024-07-15

## 2024-04-16 NOTE — PROGRESS NOTES
"Chief Complaint: Follow up for the following conditions   HPI:   Miky Benavidez is a 53 y.o. male     Secondary Hypogonadism in Male:   Took testosterone for 1 year in 2020 after low testosterone levels in 2019   Denies emotional, brain fogginess, muscle weakness,   Reports Erectyle dysfunction, decrease libido     At this time we are treating diagnoses #2 and #5 prior to beginning taking testosterone-tried and failed Monjaro  Reports needle phobia   Testosterone cypionate 120 mg (0.6ml) -doing 0.3ml weekly on Mondays      Latest Reference Range & Units 03/11/24 07:38   Hemoglobin 14.0 - 18.0 g/dL 17.4   Hematocrit 42.0 - 52.0 % 52.5 (H)     Labs were done    Latest Reference Range & Units 03/11/24 07:38   Free Testosterone 47 - 244 pg/mL 189   Sex Hormone Bind Globulin 19 - 76 nmol/L 22   Testosterone % Free 1.6 - 2.9 % 2.4   Testosterone,Total 300 - 890 ng/dL 780      Latest Reference Range & Units 03/11/24 07:38   Creatinine 0.50 - 1.40 mg/dL 1.25   GFR (CKD-EPI) >60 mL/min/1.73 m 2 69      Latest Reference Range & Units 06/05/23 07:58   PSA Total 0.0 - 4.0 ng/mL 1.5      Latest Reference Range & Units 06/05/23 07:58   TSH 0.380 - 5.330 uIU/mL 1.250   Free T-4 0.93 - 1.70 ng/dL 1.24      Latest Reference Range & Units 03/11/24 07:51   Prostatic Specific Antigen Tot 0.00 - 4.00 ng/mL 2.20       2.  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   Reports a sedentary lifestyle due to work, he will attempt to exercise more during the summer    1/23/2024  0907 4/16/2024  0954 Most Recent Value     Weight: 125 kg (276 lb) Abnormal  127 kg (280 lb) Abnormal  127 kg (280 lb) Abnormal   as of 4/16/2024    Body Mass Index:   36.94 kg/m² Abnormal   1.854 m (6' 1\")  as of 4/16/2024  127 kg (280 lb)  as of 4/16/2024        10/17/2023   0939 Most Recent Value      Weight: 124 kg (273 lb 1.6 oz) 124 kg (273 lb 1.6 oz)  as of 10/17/2023 " "    Body Mass Index:  36.03 kg/m² Abnormal    1.854 m (6' 1\")  as of 10/17/2023   124 kg (273 lb 1.6 oz)  as of 10/17/2023         3.  Other hyperlipidemia:  Currently taking Crestor 10 mg daily  Follow-up PCP     Latest Reference Range & Units 03/06/23 06:46   Cholesterol,Tot 100 - 199 mg/dL 147   Triglycerides 0 - 149 mg/dL 145   HDL >=40 mg/dL 37 !   LDL <100 mg/dL 81       4.  Vitamin D deficiency:  Currently taking 4000 IUs BID OTC daily vitamin D3   Latest Reference Range & Units 03/11/24 07:38   25-Hydroxy   Vitamin D 25 30 - 100 ng/mL 26 (L)     5.  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  High risk medication use:  On testosterone for hypogonadism treatment    7.  Controlled substance agreement signed:  Will be signed at next appointment    Patient's medications, allergies, and social histories were reviewed and updated as appropriate.    ROS:      CONS:     No fever, no chills, no weight loss, no fatigue   EYES:      No diplopia, no blurry vision, no redness of eyes, no swelling of eyelids   ENT:    No hearing loss, No ear pain, No sore throat, no dysphagia, no neck swelling   CV:     No chest pain, no palpitations, no claudication, no orthopnea, no PND   PULM:    No SOB, no cough, no hemoptysis, no wheezing    GI:   No nausea, no vomiting, no diarrhea, no constipation, no bloody stools   :  Passing urine well, no dysuria, no hematuria   ENDO:   No polyuria, no polydipsia, no heat intolerance, no cold " intolerance   NEURO: No headaches, no dizziness, no convulsions, no tremors   MUSC:  No joint swellings, no arthralgias, no myalgias, no weakness   SKIN:   No rash, no ulcers, no dry skin   PSYCH:   No depression, no anxiety, no difficulty sleeping       Past Medical History:  Patient Active Problem List    Diagnosis Date Noted    Elevated coronary artery calcium score 06/26/2023    Mixed hyperlipidemia 05/28/2019    Influenza vaccination declined 11/21/2018    Obesity (BMI 30-39.9) 11/20/2018    Calculus of bile duct without mention of cholecystitis or obstruction 11/17/2016    History of biliary stent insertion 10/24/2016    Common wart 07/17/2015    Hypotestosteronism 07/14/2015    Family history of heart disease 07/09/2015    Erectile dysfunction 07/08/2015    SOB (shortness of breath) 07/08/2015    Vitamin d deficiency 08/05/2013    Intervertebral disc rupture 08/04/2013       Past Surgical History:  Past Surgical History:   Procedure Laterality Date    ERCP W/ INSERTION STENT/TUBE N/A 11/17/2016    Procedure: ERCP W/ INSERTION STENT/TUBE - FOR STENT PLACEMENT/REMOVAL;  Surgeon: Andriy Somers M.D.;  Location: [facility];  Service:     ERCP W/REMOVAL CALCULUS N/A 11/17/2016    Procedure: ERCP W/REMOVAL CALCULUS;  Surgeon: Andriy Somers M.D.;  Location: [facility];  Service:     ERCP IN OR  10/7/2016    Procedure: Endoscopic retrogade cholangiopancreatography;  Surgeon: Andriy Somers M.D.;  Location: [facility];  Service:     SANDI BY LAPAROSCOPY  10/6/2016    Procedure: SANDI BY LAPAROSCOPY - WITH INTRAOPERATIVE CHOLANGIOGRAMS;  Surgeon: Keaton Parisi M.D.;  Location: [facility];  Service:     LUMBAR LAMINECTOMY DISKECTOMY  8/5/2013    Performed by Escobar Capps M.D. at [facility]    TONSILLECTOMY          Allergies:  Patient has no known allergies.     Current Medications:    Current Outpatient Medications:     inclisiran sodium or  "PLACEBO (STUDY DRUG) 300 mg/1.5 mL injection, Inject 300 mg under the skin every 6 months. Given in clinic, Disp: , Rfl:     testosterone cypionate (DEPO-TESTOSTERONE) 200 MG/ML injection, Inject 0.3 mL into the shoulder, thigh, or buttocks every 7 days for 90 days., Disp: 6 mL, Rfl: 1    sildenafil citrate (VIAGRA) 100 MG tablet, Take 1 Tablet by mouth 1 time a day as needed for Erectile Dysfunction., Disp: 10 Tablet, Rfl: 3    aspirin 81 MG EC tablet, Take 1 Tablet by mouth every day., Disp: 100 Tablet, Rfl: 3    metoprolol SR (TOPROL XL) 25 MG TABLET SR 24 HR, Take 0.5 Tablets by mouth every day., Disp: 45 Tablet, Rfl: 3    rosuvastatin (CRESTOR) 20 MG Tab, Take 1 Tablet by mouth every evening., Disp: 90 Tablet, Rfl: 3    SYRINGE-NEEDLE, DISP, 3 ML (BD LUER-LOCK SYRINGE) 18G X 1-1/2\" 3 ML Misc, 1 Each every 14 days. Use to draw up testosterone, Disp: 30 Each, Rfl: 11    SYRINGE-NEEDLE, DISP, 3 ML (B-D 3CC LUER-LAKESHA SYR 22GX1\") 22G X 1\" 3 ML Misc, 1 Each every 14 days. Use for testosterone injection, Disp: 30 Each, Rfl: 11    Cholecalciferol (VITAMIN D) 2000 UNIT Tab, Take 1 Tablet by mouth every day., Disp: , Rfl:     testosterone cypionate (DEPO-TESTOSTERONE) 200 MG/ML injection, Inject 0.6 mL into the shoulder, thigh, or buttocks every 7 days for 90 days. (Patient not taking: Reported on 4/16/2024), Disp: 6 mL, Rfl: 1    Testosterone Cypionate 200 MG/ML Solution, Inject 0.3 mL into the shoulder, thigh, or buttocks every 7 days., Disp: , Rfl:     Social History:  Social History     Socioeconomic History    Marital status:      Spouse name: Not on file    Number of children: Not on file    Years of education: Not on file    Highest education level: Not on file   Occupational History    Not on file   Tobacco Use    Smoking status: Never    Smokeless tobacco: Never   Vaping Use    Vaping Use: Never used   Substance and Sexual Activity    Alcohol use: Not Currently     Comment: occ    Drug use: No    Sexual " activity: Yes     Partners: Female   Other Topics Concern    Not on file   Social History Narrative    Not on file     Social Determinants of Health     Financial Resource Strain: Not on file   Food Insecurity: Not on file   Transportation Needs: Not on file   Physical Activity: Not on file   Stress: Not on file   Social Connections: Not on file   Intimate Partner Violence: Not on file   Housing Stability: Not on file        Family History:   Family History   Problem Relation Age of Onset    Diabetes Mother     Diabetes Father     Heart Disease Father          PHYSICAL EXAM:   Vital signs:   Vitals:    04/16/24 0954   BP: 119/89   Pulse: 82   SpO2: 93%        GENERAL: Well-developed, well-nourished  in no apparent distress.   LYMPH: No cervical, supraclavicular,  adenopathy palpated.   SKIN: No rashes, lesions. Turgor is normal.    Labs:    Lab Results   Component Value Date/Time    TSHULTRASEN 1.460 10/31/2020 0830     No results found for: FREEDIR  Lab Results   Component Value Date/Time    FREET3 3.56 10/31/2020 0830     No results found for: THYSTIMIG    No results found for: LH    Lab Results   Component Value Date/Time    FSH <0.3 (L) 10/31/2020 0830       Lab Results   Component Value Date/Time    TESTOSTERONE 250 11/01/2022 0853           Imaging:      ASSESSMENT/PLAN:   1. Secondary male hypogonadism  Stable  Patient is agreeable to continue of his testosterone replacement with Testosterone cypionate 0.3ml weekly   Discussed risks and benefits of too much testosterone replacement  Controlled substance agreement signed  Discussed when to take injection    Discussed to talk to pcp about mildly high Hct to see if sleep apnea is going one     - Testosterone, Free & Total, Adult Male (w/SHBG); Future  - HEMOGLOBIN AND HEMATOCRIT; Future  - MICROALBUMIN CREAT RATIO URINE; Future  - Comp Metabolic Panel; Future    2. Obesity (BMI 35.0-39.9 without comorbidity)  Unstable  Continue regimen-HPI   - Exercise for least  150 minutes/week  - Stable hydration  - Maintain a healthy diet, minimal carbohydrate, portion control     3. Other hyperlipidemia  Unstable  Continue regimen-HPI     4. Vitamin D deficiency  - Stable  -Continue regimen-see HPI      5. Prediabetes  - Stable  -Continue regimen-see HPI  - Exercise for least 150 minutes/week  - Stable hydration  - Daily feet check    7. Controlled substance agreement signed  Signed on 4/16/2024  - Controlled Substance Treatment Agreement       Disposition: Make an appointment to follow-up in 6 months  Do your blood work 2 weeks prior to next appointment        Thank you kindly for allowing me to participate in the endocrine care plan for this patient.    Eddie Cha A.P.R.N.  04/16/24          CC:   Rosa Riddle M.D.

## 2024-05-01 DIAGNOSIS — E29.1 SECONDARY MALE HYPOGONADISM: ICD-10-CM

## 2024-05-01 RX ORDER — TESTOSTERONE CYPIONATE 200 MG/ML
60 INJECTION, SOLUTION INTRAMUSCULAR
Qty: 6 ML | Refills: 1 | Status: SHIPPED | OUTPATIENT
Start: 2024-05-01 | End: 2024-07-30

## 2024-06-23 ASSESSMENT — ENCOUNTER SYMPTOMS
NAUSEA: 0
ORTHOPNEA: 0
WEIGHT LOSS: 0
FALLS: 0
TINGLING: 0
VOMITING: 0
BLURRED VISION: 0
PALPITATIONS: 0
PND: 0
BRUISES/BLEEDS EASILY: 0
COUGH: 0
DIZZINESS: 0
CLAUDICATION: 0
BLOOD IN STOOL: 0
FEVER: 0
LOSS OF CONSCIOUSNESS: 0
MYALGIAS: 0
ABDOMINAL PAIN: 0
SHORTNESS OF BREATH: 0

## 2024-06-23 NOTE — PROGRESS NOTES
Chief Complaint   Patient presents with    Follow-Up       Subjective     Miky Benavidez is a 51 y.o. male who presents today for cardiac follow-up research trial of Victorian plaque regarding hyperlipidemia, coronary artery calcium on CT, and premature family history of heart disease.  Kindly referred by Dr. Riddle.  In addition, patient is on medical problems of BMI 30-39, Hx Covid PNA (10/2021), erectile dysfunction, and vitamin D deficiency.  Patient was last seen by myself on 1/3/2024.    Cardiovascular Risk Factors:  1. Smoking status: Denies  2. Type II Diabetes Mellitus: A1C 5.3  3. Hypertension:  Present on exam today  4. Dyslipidemia: present  5. Family history of early Coronary Artery Disease in a first degree relative (Male less than 55 years of age; Female less than 65 years of age): Present.  Father at the age of 52  6.  Obesity and/or Metabolic Syndrome: Present  7. Sedentary lifestyle: Present; physically active on weekends  8. Substance Abuse (ETOH, Caffine, Recreational substances): Hx high caffeine use, 5 hour energy when working nights (1-2x/week)  9. Hx of CKD or autoimmune diseases (lupus or RA): denies    Today, Patient feels well, denies chest pain, shortness of breath, palpitations, dizziness/lightheadedness, orthopnea, PND or Edema.  Patient has been increasing his activity while training his dog.  We will continue medical therapy as previously prescribed.  Patient to receive next dose of trial injection per protocol.  Patient to follow-up in 6 months.      Past Medical History:   Diagnosis Date    Hepatic steatosis     Hypogonadism in male     Vitamin D deficiency      Past Surgical History:   Procedure Laterality Date    ERCP W/ INSERTION STENT/TUBE N/A 11/17/2016    Procedure: ERCP W/ INSERTION STENT/TUBE - FOR STENT PLACEMENT/REMOVAL;  Surgeon: Andriy Somers M.D.;  Location: [facility]ERY [facility];  Service:     ERCP W/REMOVAL CALCULUS N/A 11/17/2016    Procedure: ERCP W/REMOVAL  CALCULUS;  Surgeon: Andriy Somers M.D.;  Location: SURGERY [facility];  Service:     ERCP IN OR  10/7/2016    Procedure: Endoscopic retrogade cholangiopancreatography;  Surgeon: Andriy Somers M.D.;  Location: SURGERY [facility];  Service:     SANDI BY LAPAROSCOPY  10/6/2016    Procedure: SANDI BY LAPAROSCOPY - WITH INTRAOPERATIVE CHOLANGIOGRAMS;  Surgeon: Keaton Parisi M.D.;  Location: SURGERY [facility];  Service:     LUMBAR LAMINECTOMY DISKECTOMY  8/5/2013    Performed by Escobar Capps M.D. at SURGERY [facility]    TONSILLECTOMY       Family History   Problem Relation Age of Onset    Diabetes Mother     Diabetes Father     Heart Disease Father      Social History     Socioeconomic History    Marital status:      Spouse name: Not on file    Number of children: Not on file    Years of education: Not on file    Highest education level: Not on file   Occupational History    Not on file   Tobacco Use    Smoking status: Never    Smokeless tobacco: Never   Vaping Use    Vaping status: Never Used   Substance and Sexual Activity    Alcohol use: Not Currently     Comment: occ    Drug use: No    Sexual activity: Yes     Partners: Female   Other Topics Concern    Not on file   Social History Narrative    Not on file     Social Determinants of Health     Financial Resource Strain: Not on file   Food Insecurity: Not on file   Transportation Needs: Not on file   Physical Activity: Not on file   Stress: Not on file   Social Connections: Not on file   Intimate Partner Violence: Not on file   Housing Stability: Not on file     No Known Allergies  Outpatient Encounter Medications as of 6/24/2024   Medication Sig Dispense Refill    Cholecalciferol (VITAMIN D) 2000 UNIT Tab Take 2 Tablets by mouth every day.      testosterone cypionate (DEPO-TESTOSTERONE) 200 MG/ML injection Inject 0.3 mL into the shoulder, thigh, or buttocks every 7 days for 90 days. 6 mL 1    inclisiran sodium or PLACEBO (STUDY  "DRUG) 300 mg/1.5 mL injection Inject 300 mg under the skin every 6 months. Given in clinic      Testosterone Cypionate 200 MG/ML Solution Inject 0.3 mL into the shoulder, thigh, or buttocks every 7 days.      sildenafil citrate (VIAGRA) 100 MG tablet Take 1 Tablet by mouth 1 time a day as needed for Erectile Dysfunction. 10 Tablet 3    aspirin 81 MG EC tablet Take 1 Tablet by mouth every day. 100 Tablet 3    metoprolol SR (TOPROL XL) 25 MG TABLET SR 24 HR Take 0.5 Tablets by mouth every day. 45 Tablet 3    rosuvastatin (CRESTOR) 20 MG Tab Take 1 Tablet by mouth every evening. 90 Tablet 3    SYRINGE-NEEDLE, DISP, 3 ML (BD LUER-LOCK SYRINGE) 18G X 1-1/2\" 3 ML Misc 1 Each every 14 days. Use to draw up testosterone 30 Each 11    SYRINGE-NEEDLE, DISP, 3 ML (B-D 3CC LUER-LAKESHA SYR 22GX1\") 22G X 1\" 3 ML Misc 1 Each every 14 days. Use for testosterone injection 30 Each 11    [DISCONTINUED] Cholecalciferol (VITAMIN D) 2000 UNIT Tab Take 1 Tablet by mouth every day.       No facility-administered encounter medications on file as of 6/24/2024.     Review of Systems   Constitutional:  Negative for fever, malaise/fatigue and weight loss.   Eyes:  Negative for blurred vision.   Respiratory:  Negative for cough and shortness of breath.    Cardiovascular:  Negative for chest pain, palpitations, orthopnea, claudication, leg swelling and PND.   Gastrointestinal:  Negative for abdominal pain, blood in stool, nausea and vomiting.   Genitourinary:  Negative for dysuria, frequency and hematuria.   Musculoskeletal:  Negative for falls and myalgias.   Neurological:  Negative for dizziness, tingling and loss of consciousness.   Endo/Heme/Allergies:  Does not bruise/bleed easily.              Objective     /80 (BP Location: Left arm, Patient Position: Sitting, BP Cuff Size: Adult)   Pulse 80   Resp 18   Ht 1.854 m (6' 1\")   Wt (!) 128 kg (282 lb)   SpO2 96%   BMI 37.21 kg/m²     Physical Exam  Vitals reviewed.   Constitutional:     " "  Appearance: He is well-developed.   HENT:      Head: Normocephalic and atraumatic.   Eyes:      Pupils: Pupils are equal, round, and reactive to light.   Neck:      Vascular: No JVD.   Cardiovascular:      Rate and Rhythm: Normal rate and regular rhythm.      Pulses: Normal pulses.      Heart sounds: Normal heart sounds. No murmur heard.     No friction rub. No gallop.   Pulmonary:      Effort: Pulmonary effort is normal. No respiratory distress.      Breath sounds: Normal breath sounds.   Abdominal:      General: Bowel sounds are normal. There is no distension.      Palpations: Abdomen is soft.   Musculoskeletal:      Right lower leg: No edema.      Left lower leg: No edema.   Skin:     General: Skin is warm and dry.      Findings: No erythema.   Neurological:      Mental Status: He is alert and oriented to person, place, and time.   Psychiatric:         Behavior: Behavior normal.              Lab Results   Component Value Date/Time    SODIUM 137 03/11/2024 07:38 AM    POTASSIUM 4.3 03/11/2024 07:38 AM    CHLORIDE 103 03/11/2024 07:38 AM    CO2 23 03/11/2024 07:38 AM    GLUCOSE 100 (H) 03/11/2024 07:38 AM    BUN 13 03/11/2024 07:38 AM    CREATININE 1.25 03/11/2024 07:38 AM     Lab Results   Component Value Date/Time    WBC 10.1 11/11/2016 04:46 PM    RBC 5.00 11/11/2016 04:46 PM    HEMOGLOBIN 17.4 03/11/2024 07:38 AM    HEMATOCRIT 52.5 (H) 03/11/2024 07:38 AM    MCV 89.0 11/11/2016 04:46 PM    MCH 29.6 11/11/2016 04:46 PM    MCHC 33.3 (L) 11/11/2016 04:46 PM    MPV 9.9 11/11/2016 04:46 PM    NEUTSPOLYS 77.50 (H) 10/11/2016 12:13 AM    LYMPHOCYTES 12.40 (L) 10/11/2016 12:13 AM    MONOCYTES 7.60 10/11/2016 12:13 AM    EOSINOPHILS 1.70 10/11/2016 12:13 AM    BASOPHILS 0.30 10/11/2016 12:13 AM    HYPOCHROMIA 1+ 09/30/2013 08:13 AM      No results found for: \"BNPBTYPENAT\"   No results found for: \"CRPCARDIAC\"  Lab Results   Component Value Date/Time    CHOLSTRLTOT 147 03/06/2023 06:46 AM    LDL 81 03/06/2023 06:46 AM "    HDL 37 (A) 03/06/2023 06:46 AM    TRIGLYCERIDE 145 03/06/2023 06:46 AM       Lab Results   Component Value Date/Time    PROTHROMBTM 12.6 08/04/2013 12:50 AM    INR 0.92 08/04/2013 12:50 AM     Lab Results   Component Value Date/Time    TROPONINI <0.01 10/05/2016 03:00 PM      Treadmill Stress (1/29/2019): Provider conclusions and analysis:Baseline ECG:Normal ECG   Stress ECG: No ischemic T wave changes with peak stress   Impression: Normal stress ECG     Coronary CT heart (10/24/2022):  FINDINGS:  Limitations: No significant bolus or motion limitations are present.  Coronary calcification:  LMA - 18.1  LCX - 0.0  LAD - 0.0  RCA - 0.0  Total Calcium Score: 18.1  Percentile: Calcium score is above the 50th percentile for the patient's age and sex.  Coronary CTA:  Dominance: Right dominant circulation.     Left Main: Large caliber vessel with a normal takeoff from the left coronary cusp that bifurcates to form a left anterior descending artery and a left circumflex artery. Small amount of calcified plaque distally without significant luminal reduction.     LAD and Diagonals: Patent. Small amount of noncalcified plaque at the midportion near the takeoff of 2nd diagonal, with less than 50% luminal reduction. Ramus intermedius is patent with no evidence of plaque or stenosis.     LCX and Obtuse Marginals: Patent. No plaque or stenosis.     RCA, Posterior Descending and Posterolateral Branches: Patent. No plaque or stenosis.     Cardiac Structure and Morphology:  Left atrium: Normal in size. No thrombus in the left atrial appendage.  Left ventricle: Normal in size. No stigmata of prior infarction. No abnormal filling defect.  Pericardium: Normal thickness. No pericardial effusion or calcification.  Cardiac valves: No thickening or calcifications in the aortic or mitral valves.  Aorta: No aneurysm of the visualized thoracic aorta.  3D angiographic/MIP images of the vasculature confirm the vascular findings as described  above.  Extracardiac findings:  Lungs: Apparent pleural thickening at the RIGHT lung apex posteriorly, partially visualized.  Punctate calcified nodules in the LEFT lower lobe and lingula consistent with granulomas.  Mediastinum: No lymphadenopathy.  Upper abdomen: Elevated LEFT hemidiaphragm.  Bones: Unremarkable.     IMPRESSION:     1.  Small noncalcified plaque in the distal LEFT main coronary artery without significant luminal reduction.  2.  Small amount of noncalcified plaque in the mid LAD without significant stenosis.  3.  No other evidence for significant coronary artery stenosis or occlusion.  4.  Apparent focal pleural thickening versus less likely pleural-based mass at the RIGHT lung apex posteriorly, partially visualized.  Consider follow-up CT chest.     Calcium Score of 1-99 AND <75th percentile    Heart CTA (3/21/2023):  Coronary calcification:  LMA - 18.1  LCX - 0.0  LAD - 0.0  RCA - 0.0     Total Calcium Score: 18.1     Percentile: Calcium score is below the 75th percentile for the patient's age and sex.     Coronary CTA:  Dominance: Right dominant circulation.     Left Main: Large caliber vessel with a normal takeoff from the left coronary cusp that bifurcates to form a left anterior descending artery and a left circumflex artery. Minimal calcified plaque in the distal left main results in no hemodynamically   significant stenosis.     LAD and Diagonals: Patent. Minimal noncalcified plaque in the mid LAD results in no hemodynamically significant stenosis. Patent ramus intermedius.     LCX and Obtuse Marginals: Patent. No plaque or stenosis.     RCA, Posterior Descending and Posterolateral Branches: Patent. No plaque or stenosis.     Cardiac Structure and Morphology:  Left atrium: Normal in size. No thrombus in the left atrial appendage.  Left ventricle: Normal in size. No stigmata of prior infarction. No abnormal filling defect.  Pericardium: Normal thickness. No pericardial effusion or  calcification.  Cardiac valves: No thickening or calcifications in the aortic or mitral valves.  Aorta: A 4 cm ascending aortic aneurysm.     3D angiographic/MIP images of the vasculature confirm the vascular findings as described above.     Extracardiac findings:     Lungs: A few tiny calcified granulomas.  Mediastinum: No lymphadenopathy.  Upper abdomen: Unremarkable.  Bones: Unremarkable.    Assessment & Plan     1. Dyslipidemia        2. Essential hypertension, benign        3. Research study patient        4. Elevated coronary artery calcium score        5. Family history of heart attack        6. Obesity (BMI 30-39.9)                Medical Decision Making: Today's Assessment/Status/Plan:        Victorion Plaque study visit    Nonobstructive CAD; hyperlipidemia; premature family history CAD; pre-DM; BMI 30-39; hypertension; ED  -Blood pressure well controlled in office today  -Mild atherosclerosis on coronary CTA  -Continue aspirin and rosuvastatin as tolerated  -Continue metoprolol XL 12.5 mg daily as tolerated  -Initial LDL 91. Prior to study. Blinded to re-evaluation    -Victorian Plaque with Inclisiran versus placebo with forth injection today.    Follow-up per protocol.  Sooner as needed    Patient verbalizes understanding and agrees with the plan of care.     CANDELARIO Stern.   [facility]  [phone]    PLEASE NOTE: This Note was created using voice recognition Software. I have made every reasonable attempt to correct obvious errors, but I expect that there are errors of grammar and possibly content that I did not discover before finalizing the note

## 2024-06-24 ENCOUNTER — OFFICE VISIT (OUTPATIENT)
Dept: CARDIOLOGY | Facility: MEDICAL CENTER | Age: 54
End: 2024-06-24
Attending: NURSE PRACTITIONER
Payer: COMMERCIAL

## 2024-06-24 ENCOUNTER — RESEARCH ENCOUNTER (OUTPATIENT)
Dept: CARDIOLOGY | Facility: MEDICAL CENTER | Age: 54
End: 2024-06-24

## 2024-06-24 VITALS
BODY MASS INDEX: 37.37 KG/M2 | SYSTOLIC BLOOD PRESSURE: 122 MMHG | HEIGHT: 73 IN | HEART RATE: 80 BPM | WEIGHT: 282 LBS | DIASTOLIC BLOOD PRESSURE: 80 MMHG | OXYGEN SATURATION: 96 % | RESPIRATION RATE: 18 BRPM

## 2024-06-24 VITALS
WEIGHT: 282.19 LBS | SYSTOLIC BLOOD PRESSURE: 117 MMHG | BODY MASS INDEX: 37.23 KG/M2 | HEART RATE: 79 BPM | DIASTOLIC BLOOD PRESSURE: 95 MMHG

## 2024-06-24 DIAGNOSIS — E66.9 OBESITY (BMI 30-39.9): ICD-10-CM

## 2024-06-24 DIAGNOSIS — E78.5 DYSLIPIDEMIA: ICD-10-CM

## 2024-06-24 DIAGNOSIS — Z82.49 FAMILY HISTORY OF HEART ATTACK: ICD-10-CM

## 2024-06-24 DIAGNOSIS — R93.1 ELEVATED CORONARY ARTERY CALCIUM SCORE: ICD-10-CM

## 2024-06-24 DIAGNOSIS — I10 ESSENTIAL HYPERTENSION, BENIGN: ICD-10-CM

## 2024-06-24 DIAGNOSIS — Z00.6 RESEARCH STUDY PATIENT: ICD-10-CM

## 2024-06-24 PROCEDURE — 99213 OFFICE O/P EST LOW 20 MIN: CPT | Performed by: NURSE PRACTITIONER

## 2024-06-24 PROCEDURE — 99212 OFFICE O/P EST SF 10 MIN: CPT | Performed by: NURSE PRACTITIONER

## 2024-06-24 RX ORDER — CHOLECALCIFEROL (VITAMIN D3) 50 MCG
4000 TABLET ORAL DAILY
COMMUNITY
Start: 2024-06-24

## 2024-06-24 NOTE — RESEARCH NOTE
Name: Miky Benavidez   MRN: [id number]  Participant ID:  [id number]  : 1970  Visit Date/Time: 2024 9:00 AM      Study:    [id number] - Victorion Plaque   Status Consented/Enrolled (3/6/2023)   Active Start Date 3/6/23   Participant ID [id number]   Coordinator Michelle Austin; Shaista Baca; Geni Willis   [facility] [id number]   [id number]    Wilder Hollis M.D.     Study Note:      Miky here today for study 15 Month visit. He is feeling well with no AE's to report. Cholecalciferol increased to 4000 units qd.      ANGEL Crow conducted physical exam today, see her note.     Study medication of Inclisiran or placebo Kit #[id number] injection given at 10:17am by Kiel Truong RN in right lateral abdomen.      Patient left feeling well and understands to call with any questions or AE's.     Vitals: BP OMRON reading after sitting for 5 minutes, 1-2 min apart  Sitting quietly starting 9:56 am    Vitals:    24 1003 24 1005 24 1006   BP: (!) 135/97 (!) 131/95 (!) 117/95   BP Location: Right arm     Patient Position: Sitting     Pulse: 70 71 79   Weight: (!) 128 kg (282 lb 3 oz)         Labs:                  Patient reports fasting for >10hours. Study labs drawn and processed today per protocol.   Refrigerated labs shipped today Tracking #[id number]      Meds:      Current Outpatient Medications   Medication Sig Dispense Refill    Cholecalciferol (VITAMIN D) 2000 UNIT Tab Take 2 Tablets by mouth every day.      testosterone cypionate (DEPO-TESTOSTERONE) 200 MG/ML injection Inject 0.3 mL into the shoulder, thigh, or buttocks every 7 days for 90 days. 6 mL 1    inclisiran sodium or PLACEBO (STUDY DRUG) 300 mg/1.5 mL injection Inject 300 mg under the skin every 6 months. Given in clinic      Testosterone Cypionate 200 MG/ML Solution Inject 0.3 mL into the shoulder, thigh, or buttocks every 7 days.      sildenafil citrate (VIAGRA) 100 MG tablet Take 1 Tablet by  "mouth 1 time a day as needed for Erectile Dysfunction. 10 Tablet 3    aspirin 81 MG EC tablet Take 1 Tablet by mouth every day. 100 Tablet 3    metoprolol SR (TOPROL XL) 25 MG TABLET SR 24 HR Take 0.5 Tablets by mouth every day. 45 Tablet 3    rosuvastatin (CRESTOR) 20 MG Tab Take 1 Tablet by mouth every evening. 90 Tablet 3    SYRINGE-NEEDLE, DISP, 3 ML (BD LUER-LOCK SYRINGE) 18G X 1-1/2\" 3 ML Misc 1 Each every 14 days. Use to draw up testosterone 30 Each 11    SYRINGE-NEEDLE, DISP, 3 ML (B-D 3CC LUER-LAKESHA SYR 22GX1\") 22G X 1\" 3 ML Misc 1 Each every 14 days. Use for testosterone injection 30 Each 11     No current facility-administered medications for this visit.    current meds      Follow-up: 21M study visit due 12/22/24      "

## 2024-07-23 ENCOUNTER — OFFICE VISIT (OUTPATIENT)
Dept: MEDICAL GROUP | Facility: PHYSICIAN GROUP | Age: 54
End: 2024-07-23
Payer: COMMERCIAL

## 2024-07-23 VITALS
RESPIRATION RATE: 16 BRPM | BODY MASS INDEX: 36.71 KG/M2 | TEMPERATURE: 97.5 F | HEART RATE: 76 BPM | HEIGHT: 73 IN | OXYGEN SATURATION: 98 % | DIASTOLIC BLOOD PRESSURE: 78 MMHG | SYSTOLIC BLOOD PRESSURE: 120 MMHG | WEIGHT: 277 LBS

## 2024-07-23 DIAGNOSIS — E34.9 HYPOTESTOSTERONISM: ICD-10-CM

## 2024-07-23 DIAGNOSIS — N52.9 ERECTILE DYSFUNCTION, UNSPECIFIED ERECTILE DYSFUNCTION TYPE: ICD-10-CM

## 2024-07-23 DIAGNOSIS — E78.2 MIXED HYPERLIPIDEMIA: ICD-10-CM

## 2024-07-23 PROCEDURE — 99214 OFFICE O/P EST MOD 30 MIN: CPT | Performed by: FAMILY MEDICINE

## 2024-07-23 PROCEDURE — 3074F SYST BP LT 130 MM HG: CPT | Performed by: FAMILY MEDICINE

## 2024-07-23 PROCEDURE — 3078F DIAST BP <80 MM HG: CPT | Performed by: FAMILY MEDICINE

## 2024-07-23 RX ORDER — TADALAFIL 5 MG/1
5 TABLET ORAL DAILY
Qty: 90 TABLET | Refills: 3 | Status: SHIPPED | OUTPATIENT
Start: 2024-07-23

## 2024-10-14 ENCOUNTER — TELEPHONE (OUTPATIENT)
Dept: PHARMACY | Facility: MEDICAL CENTER | Age: 54
End: 2024-10-14
Payer: COMMERCIAL

## 2024-12-16 ENCOUNTER — APPOINTMENT (OUTPATIENT)
Dept: CARDIOLOGY | Facility: MEDICAL CENTER | Age: 54
End: 2024-12-16
Attending: NURSE PRACTITIONER
Payer: COMMERCIAL

## 2024-12-16 ENCOUNTER — TELEPHONE (OUTPATIENT)
Dept: CARDIOLOGY | Facility: MEDICAL CENTER | Age: 54
End: 2024-12-16
Payer: COMMERCIAL

## 2024-12-16 ENCOUNTER — RESEARCH ENCOUNTER (OUTPATIENT)
Dept: CARDIOLOGY | Facility: MEDICAL CENTER | Age: 54
End: 2024-12-16
Payer: COMMERCIAL

## 2024-12-16 VITALS
HEART RATE: 80 BPM | HEIGHT: 73 IN | DIASTOLIC BLOOD PRESSURE: 90 MMHG | RESPIRATION RATE: 16 BRPM | BODY MASS INDEX: 37.91 KG/M2 | OXYGEN SATURATION: 95 % | WEIGHT: 286 LBS | SYSTOLIC BLOOD PRESSURE: 118 MMHG

## 2024-12-16 VITALS
HEART RATE: 78 BPM | WEIGHT: 286.6 LBS | BODY MASS INDEX: 37.81 KG/M2 | SYSTOLIC BLOOD PRESSURE: 131 MMHG | DIASTOLIC BLOOD PRESSURE: 100 MMHG

## 2024-12-16 DIAGNOSIS — R93.1 ELEVATED CORONARY ARTERY CALCIUM SCORE: ICD-10-CM

## 2024-12-16 DIAGNOSIS — E66.9 OBESITY (BMI 30-39.9): ICD-10-CM

## 2024-12-16 DIAGNOSIS — I10 ESSENTIAL HYPERTENSION, BENIGN: ICD-10-CM

## 2024-12-16 DIAGNOSIS — E78.2 MIXED HYPERLIPIDEMIA: ICD-10-CM

## 2024-12-16 DIAGNOSIS — Z82.49 FAMILY HISTORY OF HEART ATTACK: ICD-10-CM

## 2024-12-16 DIAGNOSIS — E78.5 DYSLIPIDEMIA: ICD-10-CM

## 2024-12-16 DIAGNOSIS — Z00.6 RESEARCH STUDY PATIENT: ICD-10-CM

## 2024-12-16 PROCEDURE — 99212 OFFICE O/P EST SF 10 MIN: CPT | Performed by: NURSE PRACTITIONER

## 2024-12-16 PROCEDURE — 99214 OFFICE O/P EST MOD 30 MIN: CPT | Performed by: NURSE PRACTITIONER

## 2024-12-16 PROCEDURE — 3074F SYST BP LT 130 MM HG: CPT | Performed by: NURSE PRACTITIONER

## 2024-12-16 PROCEDURE — 3080F DIAST BP >= 90 MM HG: CPT | Performed by: NURSE PRACTITIONER

## 2024-12-16 ASSESSMENT — ENCOUNTER SYMPTOMS
ORTHOPNEA: 0
PND: 0
FEVER: 0
SHORTNESS OF BREATH: 1
MYALGIAS: 0
SPUTUM PRODUCTION: 1
ABDOMINAL PAIN: 0
COUGH: 1
CHILLS: 1
DIZZINESS: 0
PALPITATIONS: 0
CLAUDICATION: 0

## 2024-12-16 NOTE — PROGRESS NOTES
Chief Complaint   Patient presents with    Dyslipidemia    Hyperlipidemia       Subjective     Miky Benavidez is a 54 y.o. male who presents today for follow-up on the [facility] plaque study.    He is here for the 21-month follow-up.    Patient reports he has been doing fairly well, he does mention developing a cold, upper respiratory infection last Monday.  He started with a runny nose and congestion.  He did develop a cough with some green sputum, but now it has turned clear.  He had some chills, but denied any fevers.  He also had a little bit of shortness of breath.  Currently his symptoms are improving.  He has used Sudafed, DayQuil and NyQuil.    Patient denies chest pain, palpitations, orthopnea, PND, edema, other shortness of breath or dizziness/lightheadedness.    He does mention having some right arm pain and tingling at times from his elbow down to his hand.  He is planning on getting it looked at by his PCP, he believes it may be some sort of tennis elbow.    He did not take his metoprolol for 2 days because he did not have pills in his pill box.    Past Medical History:   Diagnosis Date    Hepatic steatosis     Hypogonadism in male     Vitamin D deficiency      Past Surgical History:   Procedure Laterality Date    ERCP W/ INSERTION STENT/TUBE N/A 11/17/2016    Procedure: ERCP W/ INSERTION STENT/TUBE - FOR STENT PLACEMENT/REMOVAL;  Surgeon: Andriy Somers M.D.;  Location: [facility];  Service:     ERCP W/REMOVAL CALCULUS N/A 11/17/2016    Procedure: ERCP W/REMOVAL CALCULUS;  Surgeon: Andriy Somers M.D.;  Location: [facility];  Service:     ERCP IN OR  10/7/2016    Procedure: Endoscopic retrogade cholangiopancreatography;  Surgeon: Andriy Somers M.D.;  Location: [facility];  Service:     SANDI BY LAPAROSCOPY  10/6/2016    Procedure: SANDI BY LAPAROSCOPY - WITH INTRAOPERATIVE CHOLANGIOGRAMS;  Surgeon: Keaton Parisi M.D.;  Location: [facility]  ORS;  Service:     LUMBAR LAMINECTOMY DISKECTOMY  8/5/2013    Performed by Escobar Capps M.D. at SURGERY [facility] ORS    TONSILLECTOMY       Family History   Problem Relation Age of Onset    Diabetes Mother     Diabetes Father     Heart Disease Father      Social History     Socioeconomic History    Marital status:      Spouse name: Not on file    Number of children: Not on file    Years of education: Not on file    Highest education level: Not on file   Occupational History    Not on file   Tobacco Use    Smoking status: Never    Smokeless tobacco: Never   Vaping Use    Vaping status: Never Used   Substance and Sexual Activity    Alcohol use: Not Currently     Comment: occ    Drug use: No    Sexual activity: Yes     Partners: Female   Other Topics Concern    Not on file   Social History Narrative    Not on file     Social Drivers of Health     Financial Resource Strain: Not on file   Food Insecurity: Not on file   Transportation Needs: Not on file   Physical Activity: Not on file   Stress: Not on file   Social Connections: Not on file   Intimate Partner Violence: Not on file   Housing Stability: Not on file     No Known Allergies  Outpatient Encounter Medications as of 12/16/2024   Medication Sig Dispense Refill    testosterone cypionate (DEPO-TESTOSTERONE) 200 MG/ML injection Inject  into the shoulder, thigh, or buttocks. 4 mL 0    tadalafil (CIALIS) 5 MG tablet Take 1 Tablet by mouth every day. 90 Tablet 3    Cholecalciferol (VITAMIN D) 2000 UNIT Tab Take 2 Tablets by mouth every day.      inclisiran sodium or PLACEBO (STUDY DRUG) 300 mg/1.5 mL injection Inject 300 mg under the skin every 6 months. Given in clinic      aspirin 81 MG EC tablet Take 1 Tablet by mouth every day. 100 Tablet 3    metoprolol SR (TOPROL XL) 25 MG TABLET SR 24 HR Take 0.5 Tablets by mouth every day. 45 Tablet 3    rosuvastatin (CRESTOR) 20 MG Tab Take 1 Tablet by mouth every evening. 90 Tablet 3    SYRINGE-NEEDLE, DISP, 3 ML  "(BD LUER-LOCK SYRINGE) 18G X 1-1/2\" 3 ML Misc 1 Each every 14 days. Use to draw up testosterone 30 Each 11    SYRINGE-NEEDLE, DISP, 3 ML (B-D 3CC LUER-LAKESHA SYR 22GX1\") 22G X 1\" 3 ML Misc 1 Each every 14 days. Use for testosterone injection 30 Each 11     No facility-administered encounter medications on file as of 12/16/2024.     Review of Systems   Constitutional:  Positive for chills. Negative for fever and malaise/fatigue.   HENT:  Positive for congestion.         Runny nose   Respiratory:  Positive for cough, sputum production and shortness of breath (With his upper respiratory infection last week, slight).    Cardiovascular:  Negative for chest pain, palpitations, orthopnea, claudication, leg swelling and PND.   Gastrointestinal:  Negative for abdominal pain.   Musculoskeletal:  Negative for myalgias.   Neurological:  Negative for dizziness.   All other systems reviewed and are negative.             Objective     BP (!) 118/90 (BP Location: Left arm, Patient Position: Sitting, BP Cuff Size: Adult)   Pulse 80   Resp 16   Ht 1.854 m (6' 1\")   Wt (!) 130 kg (286 lb)   SpO2 95%   BMI 37.73 kg/m²     Physical Exam     Patient is here today for Victorion Plaque study visit 21 months    All study assessment results reviewed.    Complete Physical Exam  GENERAL APPEARANCE: Appears healthy.  Alert; in no acute distress.  Pleasant.,   HEAD: negative, Normocephalic. No masses, lesions, tenderness or abnormalities,   EYES: Anicteric, conjunctivae/corneas clear. PERRLA. EOMI. Fundi benign.,   EARS: negative, External ears normal. Canals clear. TM's normal.,   NOSE: negative, Nares normal. Septum midline. Mucosa normal. No drainage or sinus tenderness.,   THROAT: negative, Lips, mucosa, and tongue normal. Teeth and gums normal. Oropharynx moist and without lesion ,   NECK: negative, Neck supple. No adenopathy. Thyroid symmetric, normal size, and without nodularity,   BACK: negative, Back symmetric, no curvature. ROM " normal. No CVA tenderness.,   LUNGS: negative, Percussion normal. Good diaphragmatic excursion. Lungs clear to auscultation bilaterally,   CARDIOVASCULAR: negative, PMI normal. No lifts, heaves, or thrills. RRR. No murmurs, clicks, gallops, or rubs,   ABDOMEN: Abdomen soft, non-tender. BS normal. No masses,  No organomegaly,   EXTREMITIES: negative, Extremities normal. No deformities, edema, or skin discoloration,  PULSES: negative, radial=4/4, femoral=4/4, popliteal=4/4, dorsalis pedis=4/4,   SKIN: negative, Skin color, texture, turgor normal. No rashes or lesions.,   LYMPH NODES: No palpable lymph nodes,   NEUROLOGIC: negative, Gait normal. Reflexes normal and symmetric. Sensation grossly normal    Assessment & Plan     1. Elevated coronary artery calcium score        2. Mixed hyperlipidemia        3. Family history of heart attack        4. Essential hypertension, benign        5. Research study patient        6. Obesity (BMI 30-39.9)        7. Dyslipidemia            Medical Decision Making: Today's Assessment/Status/Plan:        Patient's viral infection and cold not related to the study drug.  Also right arm pain not related to study drug.  He will be following up with PCP regarding this.     Nonobstructive CAD, Dyslipidemia, premature family history of CAD, prediabetes, morbid obesity with BMI of 37.73, hypertension:  -BP stable in office today  -Mild atherosclerosis on coronary CTA  -He will continue aspirin 81 mg daily  -Continue rosuvastatin 20 mg daily  -Continue metoprolol SR 12.5 mg daily  -Continue inclisiran versus placebo every 6 months    FU in clinic in March 2025 with Dr. Hollis and 24 month CCTA. Sooner if needed.    Patient verbalizes understanding and agrees with the plan of care.     PLEASE NOTE: This Note was created using voice recognition Software. I have made every reasonable attempt to correct obvious errors, but I expect that there are errors of grammar and possibly content that I did not  discover before finalizing the note

## 2024-12-24 ENCOUNTER — TELEPHONE (OUTPATIENT)
Dept: CARDIOLOGY | Facility: MEDICAL CENTER | Age: 54
End: 2024-12-24
Payer: COMMERCIAL

## 2024-12-24 NOTE — TELEPHONE ENCOUNTER
Called Miky to inform him that the consent he signed did not consent to a lipid panel being drawn at Month 21. He had his month 21 visit 12/16/2024 and the lipid was drawn according to protocol but not the consent. This sample will be discarded and not used for the study. I left a message on his voice mail with this information and gave him the office number to call if he has any questions.    Per newsletter 12/4/2024 and jacinto 12/20/2024, patient is to be informed about the error and that sample will be discarded. A new consent will be coming.

## 2025-01-09 ENCOUNTER — TELEPHONE (OUTPATIENT)
Dept: ENDOCRINOLOGY | Facility: MEDICAL CENTER | Age: 55
End: 2025-01-09
Payer: COMMERCIAL

## 2025-01-10 ENCOUNTER — HOSPITAL ENCOUNTER (OUTPATIENT)
Dept: LAB | Facility: MEDICAL CENTER | Age: 55
End: 2025-01-10
Payer: COMMERCIAL

## 2025-01-10 DIAGNOSIS — E29.1 SECONDARY MALE HYPOGONADISM: ICD-10-CM

## 2025-01-10 DIAGNOSIS — E78.49 OTHER HYPERLIPIDEMIA: ICD-10-CM

## 2025-01-10 LAB
HCT VFR BLD AUTO: 53.1 % (ref 42–52)
HGB BLD-MCNC: 17.1 G/DL (ref 14–18)

## 2025-01-10 PROCEDURE — 80053 COMPREHEN METABOLIC PANEL: CPT

## 2025-01-10 PROCEDURE — 85018 HEMOGLOBIN: CPT

## 2025-01-10 PROCEDURE — 80061 LIPID PANEL: CPT

## 2025-01-10 PROCEDURE — 84403 ASSAY OF TOTAL TESTOSTERONE: CPT

## 2025-01-10 PROCEDURE — 84270 ASSAY OF SEX HORMONE GLOBUL: CPT

## 2025-01-10 PROCEDURE — 85014 HEMATOCRIT: CPT

## 2025-01-10 PROCEDURE — 84402 ASSAY OF FREE TESTOSTERONE: CPT

## 2025-01-10 PROCEDURE — 36415 COLL VENOUS BLD VENIPUNCTURE: CPT

## 2025-01-11 ENCOUNTER — HOSPITAL ENCOUNTER (OUTPATIENT)
Facility: MEDICAL CENTER | Age: 55
End: 2025-01-11
Payer: COMMERCIAL

## 2025-01-11 LAB
ALBUMIN SERPL BCP-MCNC: 4 G/DL (ref 3.2–4.9)
ALBUMIN/GLOB SERPL: 1.5 G/DL
ALP SERPL-CCNC: 71 U/L (ref 30–99)
ALT SERPL-CCNC: 24 U/L (ref 2–50)
ANION GAP SERPL CALC-SCNC: 8 MMOL/L (ref 7–16)
AST SERPL-CCNC: 22 U/L (ref 12–45)
BILIRUB SERPL-MCNC: 0.6 MG/DL (ref 0.1–1.5)
BUN SERPL-MCNC: 13 MG/DL (ref 8–22)
CALCIUM ALBUM COR SERPL-MCNC: 9 MG/DL (ref 8.5–10.5)
CALCIUM SERPL-MCNC: 9 MG/DL (ref 8.5–10.5)
CHLORIDE SERPL-SCNC: 105 MMOL/L (ref 96–112)
CHOLEST SERPL-MCNC: 128 MG/DL (ref 100–199)
CO2 SERPL-SCNC: 26 MMOL/L (ref 20–33)
CREAT SERPL-MCNC: 1.32 MG/DL (ref 0.5–1.4)
CREAT UR-MCNC: 172.07 MG/DL
FASTING STATUS PATIENT QL REPORTED: NORMAL
GFR SERPLBLD CREATININE-BSD FMLA CKD-EPI: 65 ML/MIN/1.73 M 2
GLOBULIN SER CALC-MCNC: 2.6 G/DL (ref 1.9–3.5)
GLUCOSE SERPL-MCNC: 92 MG/DL (ref 65–99)
HDLC SERPL-MCNC: 35 MG/DL
LDLC SERPL CALC-MCNC: 56 MG/DL
MICROALBUMIN UR-MCNC: <1.2 MG/DL
MICROALBUMIN/CREAT UR: NORMAL MG/G (ref 0–30)
POTASSIUM SERPL-SCNC: 4.4 MMOL/L (ref 3.6–5.5)
PROT SERPL-MCNC: 6.6 G/DL (ref 6–8.2)
SODIUM SERPL-SCNC: 139 MMOL/L (ref 135–145)
TRIGL SERPL-MCNC: 185 MG/DL (ref 0–149)

## 2025-01-11 PROCEDURE — 82570 ASSAY OF URINE CREATININE: CPT

## 2025-01-11 PROCEDURE — 82043 UR ALBUMIN QUANTITATIVE: CPT

## 2025-01-12 LAB
SHBG SERPL-SCNC: 30 NMOL/L (ref 19–76)
TESTOST FREE MFR SERPL: 2.1 % (ref 1.6–2.9)
TESTOST FREE SERPL-MCNC: 138 PG/ML (ref 47–244)
TESTOST SERPL-MCNC: 663 NG/DL (ref 300–890)

## 2025-01-14 ENCOUNTER — OFFICE VISIT (OUTPATIENT)
Dept: ENDOCRINOLOGY | Facility: MEDICAL CENTER | Age: 55
End: 2025-01-14
Attending: INTERNAL MEDICINE
Payer: COMMERCIAL

## 2025-01-14 VITALS
HEIGHT: 73 IN | HEART RATE: 74 BPM | DIASTOLIC BLOOD PRESSURE: 82 MMHG | SYSTOLIC BLOOD PRESSURE: 126 MMHG | WEIGHT: 288 LBS | BODY MASS INDEX: 38.17 KG/M2 | OXYGEN SATURATION: 98 %

## 2025-01-14 DIAGNOSIS — D35.2 PITUITARY ADENOMA (HCC): ICD-10-CM

## 2025-01-14 DIAGNOSIS — E23.0 HYPOGONADOTROPIC HYPOGONADISM (HCC): ICD-10-CM

## 2025-01-14 DIAGNOSIS — Z12.5 SCREENING FOR PROSTATE CANCER: ICD-10-CM

## 2025-01-14 DIAGNOSIS — E55.9 VITAMIN D DEFICIENCY: ICD-10-CM

## 2025-01-14 DIAGNOSIS — R73.03 PREDIABETES: ICD-10-CM

## 2025-01-14 DIAGNOSIS — E66.9 OBESITY (BMI 35.0-39.9 WITHOUT COMORBIDITY): ICD-10-CM

## 2025-01-14 LAB
HBA1C MFR BLD: 5.4 % (ref ?–5.8)
POCT INT CON NEG: NEGATIVE
POCT INT CON POS: POSITIVE

## 2025-01-14 PROCEDURE — 3079F DIAST BP 80-89 MM HG: CPT | Performed by: INTERNAL MEDICINE

## 2025-01-14 PROCEDURE — 83036 HEMOGLOBIN GLYCOSYLATED A1C: CPT | Performed by: INTERNAL MEDICINE

## 2025-01-14 PROCEDURE — 99215 OFFICE O/P EST HI 40 MIN: CPT | Performed by: INTERNAL MEDICINE

## 2025-01-14 PROCEDURE — 3074F SYST BP LT 130 MM HG: CPT | Performed by: INTERNAL MEDICINE

## 2025-01-14 PROCEDURE — 99212 OFFICE O/P EST SF 10 MIN: CPT | Performed by: INTERNAL MEDICINE

## 2025-01-14 RX ORDER — TESTOSTERONE CYPIONATE 200 MG/ML
80 INJECTION, SOLUTION INTRAMUSCULAR
Qty: 4 ML | Refills: 5 | Status: SHIPPED | OUTPATIENT
Start: 2025-01-14 | End: 2025-02-11

## 2025-01-14 NOTE — PROGRESS NOTES
Chief Complaint: Consult requested by Rosa Riddle M.D. for evaluation of hypogonadotrophic hypogonadism    HPI:     Miky Benavidez is a 54 y.o. male with history of Hypogonadism diagnosed 2015 in 2019.  He has 2 low morning total testosterone levels drawn at least 24 hours apart.  On July 11, 2015 morning total testosterone was 175 and on March 27, 2019 morning total testosterone was 123.  He had symptoms of hypogonadism such as increased emotional lability fatigue decreased energy and decreased libido.  His FSH was low at 0.3 compatible with hypogonadotropic hypogonadism.  His prolactin was normal at 28 TSH was normal at baseline.  He has not had a pituitary MRI.      Risk factors for hypogonadism include obesity with a BMI of 38.0 kg/m² and current weight of 288 pounds.  He does have prediabetes with a baseline A1c of 5.7%.  He was previously treated with Mounjaro off label for weight loss which was eventually discontinued because of GI side effects such as diarrhea.  He works for [employer] as a     He was previously seen by the nurse practitioner.  This is my first time meeting him      He is currently on IM testosterone weekly.  Previously he was on IM testosterone every 2 weeks but he self adjusted the dose from 0.6 mL every 2 weeks to 0.3 mL or 60 mg every week.  He feels good on this dose however he feels that he should be on a higher dose of testosterone.  He denies decreased libido, depression, chest pain, difficulty breathing, and difficulty with urination.  His last PSA was normal in 2023 but has not been rechecked      His most recent labs show a normal hematocrit of 53% and total testosterone of 663 on January 10, 2025 which was drawn close to the midpoint.  PSA has not been drawn    His A1c today is stable at 5.6%    As previously stated he has obesity with a BMI of 38 and current weight of 288 pounds.  He is not sure if he has sleep apnea        Patient's medications, allergies, and social  histories were reviewed and updated as appropriate.      ROS:      CONS:     No fever, no chills, no weight loss, no fatigue   EYES:      No diplopia, no blurry vision, no redness of eyes, no swelling of eyelids   ENT:    No hearing loss, No ear pain, No sore throat, no dysphagia, no neck swelling   CV:     No chest pain, no palpitations, no claudication, no orthopnea, no PND   PULM:    No SOB, no cough, no hemoptysis, no wheezing    GI:   No nausea, no vomiting, no diarrhea, no constipation, no bloody stools   :  Passing urine well, no dysuria, no hematuria   ENDO:   No polyuria, no polydipsia, no heat intolerance, no cold intolerance   NEURO: No headaches, no dizziness, no convulsions, no tremors   MUSC:  No joint swellings, no arthralgias, no myalgias, no weakness   SKIN:   No rash, no ulcers, no dry skin   PSYCH:   No depression, no anxiety, no difficulty sleeping       Past Medical History:  Patient Active Problem List    Diagnosis Date Noted    Hypogonadotropic hypogonadism (HCC) 01/14/2025    Prediabetes 01/14/2025    Elevated coronary artery calcium score 06/26/2023    Mixed hyperlipidemia 05/28/2019    Influenza vaccination declined 11/21/2018    Obesity (BMI 30-39.9) 11/20/2018    Calculus of bile duct without mention of cholecystitis or obstruction 11/17/2016    History of biliary stent insertion 10/24/2016    Common wart 07/17/2015    Hypotestosteronism 07/14/2015    Family history of heart disease 07/09/2015    Erectile dysfunction 07/08/2015    SOB (shortness of breath) 07/08/2015    Vitamin d deficiency 08/05/2013    Intervertebral disc rupture 08/04/2013       Past Surgical History:  Past Surgical History:   Procedure Laterality Date    ERCP W/ INSERTION STENT/TUBE N/A 11/17/2016    Procedure: ERCP W/ INSERTION STENT/TUBE - FOR STENT PLACEMENT/REMOVAL;  Surgeon: Andriy Somers M.D.;  Location: SURGERY [facility];  Service:     ERCP W/REMOVAL CALCULUS N/A 11/17/2016    Procedure: ERCP  "W/REMOVAL CALCULUS;  Surgeon: Andriy Somers M.D.;  Location: SURGERY [facility];  Service:     ERCP IN OR  10/7/2016    Procedure: Endoscopic retrogade cholangiopancreatography;  Surgeon: Andriy Somers M.D.;  Location: SURGERY [facility];  Service:     SANDI BY LAPAROSCOPY  10/6/2016    Procedure: SANDI BY LAPAROSCOPY - WITH INTRAOPERATIVE CHOLANGIOGRAMS;  Surgeon: Keaton Parisi M.D.;  Location: SURGERY [facility];  Service:     LUMBAR LAMINECTOMY DISKECTOMY  8/5/2013    Performed by Escobar Capps M.D. at SURGERY [facility]    TONSILLECTOMY          Allergies:  Patient has no known allergies.     Current Medications:    Current Outpatient Medications:     testosterone cypionate (DEPO-TESTOSTERONE) 200 MG/ML injection, Inject 0.4 mL into the shoulder, thigh, or buttocks every 7 days for 28 days., Disp: 4 mL, Rfl: 5    tadalafil (CIALIS) 5 MG tablet, Take 1 Tablet by mouth every day., Disp: 90 Tablet, Rfl: 3    Cholecalciferol (VITAMIN D) 2000 UNIT Tab, Take 2 Tablets by mouth every day., Disp: , Rfl:     aspirin 81 MG EC tablet, Take 1 Tablet by mouth every day., Disp: 100 Tablet, Rfl: 3    metoprolol SR (TOPROL XL) 25 MG TABLET SR 24 HR, Take 0.5 Tablets by mouth every day., Disp: 45 Tablet, Rfl: 3    rosuvastatin (CRESTOR) 20 MG Tab, Take 1 Tablet by mouth every evening., Disp: 90 Tablet, Rfl: 3    SYRINGE-NEEDLE, DISP, 3 ML (BD LUER-LOCK SYRINGE) 18G X 1-1/2\" 3 ML Misc, 1 Each every 14 days. Use to draw up testosterone, Disp: 30 Each, Rfl: 11    SYRINGE-NEEDLE, DISP, 3 ML (B-D 3CC LUER-LAKESHA SYR 22GX1\") 22G X 1\" 3 ML Misc, 1 Each every 14 days. Use for testosterone injection, Disp: 30 Each, Rfl: 11    inclisiran sodium or PLACEBO (STUDY DRUG) 300 mg/1.5 mL injection, Inject 300 mg under the skin every 6 months. Given in clinic (Patient not taking: Reported on 1/14/2025), Disp: , Rfl:     Social History:  Social History     Socioeconomic History    Marital status:      Spouse " "name: Not on file    Number of children: Not on file    Years of education: Not on file    Highest education level: Not on file   Occupational History    Not on file   Tobacco Use    Smoking status: Never    Smokeless tobacco: Never   Vaping Use    Vaping status: Never Used   Substance and Sexual Activity    Alcohol use: Not Currently     Comment: occ    Drug use: No    Sexual activity: Yes     Partners: Female   Other Topics Concern    Not on file   Social History Narrative    Not on file     Social Drivers of Health     Financial Resource Strain: Not on file   Food Insecurity: Not on file   Transportation Needs: Not on file   Physical Activity: Not on file   Stress: Not on file   Social Connections: Not on file   Intimate Partner Violence: Not on file   Housing Stability: Not on file        Family History:   Family History   Problem Relation Age of Onset    Diabetes Mother     Diabetes Father     Heart Disease Father          PHYSICAL EXAM:   Vital signs: /82 (BP Location: Left arm, Patient Position: Sitting, BP Cuff Size: Adult long)   Pulse 74   Ht 1.854 m (6' 1\")   Wt (!) 131 kg (288 lb)   SpO2 98%   BMI 38.00 kg/m²   GENERAL: Well-developed, well-nourished  in no apparent distress.   EYE: No ocular and eyelid asymmetry, Anicteric sclerae,  PERRL, No exophthalmos or lidlag  HENT: Hearing grossly intact, Normocephalic, atraumatic. Pink, moist mucous membranes, No exudate  NECK: Supple. Trachea midline. thyroid is normal in size without nodules or tenderness  CHEST: no gynecomastia  CARDIOVASCULAR: Regular rate and rhythm. No murmurs, rubs, or gallops.   LUNGS: Clear to auscultation bilaterally   ABDOMEN: Soft, nontender with positive bowel sounds.   GENIT: normal  EXTREMITIES: No clubbing, cyanosis, or edema.   NEUROLOGICAL: Cranial nerves II-XII are grossly intact   Symmetric reflexes at the patella no proximal muscle weakness, No visible tremor with both outstretched hands  LYMPH: No cervical, " "supraclavicular,  adenopathy palpated.   SKIN: No rashes, lesions. Turgor is normal.    Labs:  Lab Results   Component Value Date/Time    WBC 10.1 11/11/2016 04:46 PM    RBC 5.00 11/11/2016 04:46 PM    HEMOGLOBIN 17.1 01/10/2025 08:37 AM    MCV 89.0 11/11/2016 04:46 PM    MCH 29.6 11/11/2016 04:46 PM    MCHC 33.3 (L) 11/11/2016 04:46 PM    RDW 41.4 11/11/2016 04:46 PM    MPV 9.9 11/11/2016 04:46 PM       Lab Results   Component Value Date/Time    SODIUM 139 01/10/2025 08:37 AM    POTASSIUM 4.4 01/10/2025 08:37 AM    CHLORIDE 105 01/10/2025 08:37 AM    CO2 26 01/10/2025 08:37 AM    ANION 8.0 01/10/2025 08:37 AM    GLUCOSE 92 01/10/2025 08:37 AM    BUN 13 01/10/2025 08:37 AM    CREATININE 1.32 01/10/2025 08:37 AM    CALCIUM 9.0 01/10/2025 08:37 AM    ASTSGOT 22 01/10/2025 08:37 AM    ALTSGPT 24 01/10/2025 08:37 AM    TBILIRUBIN 0.6 01/10/2025 08:37 AM    ALBUMIN 4.0 01/10/2025 08:37 AM    TOTPROTEIN 6.6 01/10/2025 08:37 AM    GLOBULIN 2.6 01/10/2025 08:37 AM    AGRATIO 1.5 01/10/2025 08:37 AM       Lab Results   Component Value Date/Time    TSHULTRASEN 1.250 06/05/2023 0758     No results found for: \"FREEDIR\"  Lab Results   Component Value Date/Time    FREET3 3.56 10/31/2020 0830     No results found for: \"THYSTIMIG\"    No results found for: \"LH\"    Lab Results   Component Value Date/Time    FSH <0.3 (L) 10/31/2020 0830       Lab Results   Component Value Date/Time    TESTOSTERONE 663 01/10/2025 0837           Imaging:      ASSESSMENT/PLAN:      1. Hypogonadotropic hypogonadism (HCC)  Controlled  Adjust IM testosterone to 80 mg or 0.4 mL every week.  Repeat testosterone hematocrit and get PSA with future labs  Follow-up in 6 months with labs    2. Prediabetes  Stable  Reviewed the increased risk of developing type 2 diabetes  Discussed the importance of diet and exercise  Reviewed importance of lifestyle changes  Continue monitoring      3. Obesity (BMI 35.0-39.9 without comorbidity)  Unstable he does have obesity.  " It is unclear if he has weight related complications.    I recommend that we screen for sleep apnea.  Will discuss getting a sleep study in the future  He was previously on Mounjaro.  He may be a candidate for a different GLP-1 like Zepbound will discuss this in the future    4. Vitamin D deficiency  Stable check calcium vitamin D with future labs    5. Pituitary adenoma (HCC)  Unstable because testosterone levels at baseline were very low at less than 200 I want to get an MRI of his pituitary to make sure he does not have a pituitary adenoma as the etiology of his hypogonadotrophic hypogonadism.    6. Screening for prostate cancer  We will check PSA with next labs      Return in about 7 months (around 8/14/2025).       Total time spent on day of service was over 60 minutes which included obtaining a detailed history and physical exam, ordering labs, coordinating care and scheduling future follow-up    Thank you kindly for allowing me to participate in the endocrine care plan for this patient.    Escobar Abarca MD, KATHY, ECNU  01/14/25    CC:   Rosa Riddle M.D.

## 2025-01-22 DIAGNOSIS — E78.2 MIXED HYPERLIPIDEMIA: ICD-10-CM

## 2025-01-23 ENCOUNTER — OFFICE VISIT (OUTPATIENT)
Dept: MEDICAL GROUP | Facility: PHYSICIAN GROUP | Age: 55
End: 2025-01-23
Payer: COMMERCIAL

## 2025-01-23 VITALS
WEIGHT: 286 LBS | RESPIRATION RATE: 11 BRPM | HEIGHT: 73 IN | BODY MASS INDEX: 37.91 KG/M2 | DIASTOLIC BLOOD PRESSURE: 86 MMHG | TEMPERATURE: 97.8 F | OXYGEN SATURATION: 97 % | SYSTOLIC BLOOD PRESSURE: 130 MMHG | HEART RATE: 70 BPM

## 2025-01-23 DIAGNOSIS — E55.9 VITAMIN D DEFICIENCY: ICD-10-CM

## 2025-01-23 DIAGNOSIS — Z12.5 SCREENING FOR MALIGNANT NEOPLASM OF PROSTATE: ICD-10-CM

## 2025-01-23 DIAGNOSIS — Z23 NEED FOR VACCINATION: ICD-10-CM

## 2025-01-23 DIAGNOSIS — Z12.83 SKIN CANCER SCREENING: ICD-10-CM

## 2025-01-23 DIAGNOSIS — E78.2 MIXED HYPERLIPIDEMIA: ICD-10-CM

## 2025-01-23 PROCEDURE — 3075F SYST BP GE 130 - 139MM HG: CPT | Performed by: FAMILY MEDICINE

## 2025-01-23 PROCEDURE — 99214 OFFICE O/P EST MOD 30 MIN: CPT | Mod: 25 | Performed by: FAMILY MEDICINE

## 2025-01-23 PROCEDURE — 90746 HEPB VACCINE 3 DOSE ADULT IM: CPT

## 2025-01-23 PROCEDURE — 90471 IMMUNIZATION ADMIN: CPT

## 2025-01-23 PROCEDURE — 3079F DIAST BP 80-89 MM HG: CPT | Performed by: FAMILY MEDICINE

## 2025-01-23 ASSESSMENT — PATIENT HEALTH QUESTIONNAIRE - PHQ9: CLINICAL INTERPRETATION OF PHQ2 SCORE: 0

## 2025-01-23 NOTE — ASSESSMENT & PLAN NOTE
Labs had shown good improvement in LDL cholesterol, patient is on Crestor 10 mg tolerating this well, is also on aspirin 81 mg.  Is in a clinical trial for Inclisiran  rna medication that breaks down PCSK9 protein  Has family history of CAD

## 2025-01-24 RX ORDER — ROSUVASTATIN CALCIUM 20 MG/1
20 TABLET, COATED ORAL EVERY EVENING
Qty: 90 TABLET | Refills: 3 | Status: SHIPPED | OUTPATIENT
Start: 2025-01-24

## 2025-01-24 NOTE — TELEPHONE ENCOUNTER
Is the patient due for a refill? Yes    Was the patient seen the last 15 months? Yes    Date of last office visit: 12.16.2024    Does the patient have an upcoming appointment?  Yes   If yes, When? 03.24.2025    Provider to refill:MELONY    Does the patient have [facility] Plus and need 100-day supply? (This applies to ALL medications) Patient does not have SCP

## 2025-01-24 NOTE — PROGRESS NOTES
"Subjective:   Miky Benavidez is a 54 y.o. male here today for evaluation and management of:     Mixed hyperlipidemia  Labs had shown good improvement in LDL cholesterol, patient is on Crestor 10 mg tolerating this well, is also on aspirin 81 mg.  Is in a clinical trial for Inclisiran  rna medication that breaks down PCSK9 protein  Has family history of CAD         Current medicines (including changes today)  Current Outpatient Medications   Medication Sig Dispense Refill    Zoster Vac Recomb Adjuvanted (SHINGRIX) 50 MCG/0.5ML Recon Susp Inject 0.5 mL into the shoulder, thigh, or buttocks one time for 1 dose. 0.5 mL 0    testosterone cypionate (DEPO-TESTOSTERONE) 200 MG/ML injection Inject 0.4 mL into the shoulder, thigh, or buttocks every 7 days for 28 days. 4 mL 5    tadalafil (CIALIS) 5 MG tablet Take 1 Tablet by mouth every day. 90 Tablet 3    Cholecalciferol (VITAMIN D) 2000 UNIT Tab Take 2 Tablets by mouth every day.      aspirin 81 MG EC tablet Take 1 Tablet by mouth every day. 100 Tablet 3    metoprolol SR (TOPROL XL) 25 MG TABLET SR 24 HR Take 0.5 Tablets by mouth every day. 45 Tablet 3    rosuvastatin (CRESTOR) 20 MG Tab Take 1 Tablet by mouth every evening. 90 Tablet 3    SYRINGE-NEEDLE, DISP, 3 ML (BD LUER-LOCK SYRINGE) 18G X 1-1/2\" 3 ML Misc 1 Each every 14 days. Use to draw up testosterone 30 Each 11    SYRINGE-NEEDLE, DISP, 3 ML (B-D 3CC LUER-LAKESHA SYR 22GX1\") 22G X 1\" 3 ML Misc 1 Each every 14 days. Use for testosterone injection 30 Each 11    inclisiran sodium or PLACEBO (STUDY DRUG) 300 mg/1.5 mL injection Inject 300 mg under the skin every 6 months. Given in clinic (Patient not taking: Reported on 1/23/2025)       No current facility-administered medications for this visit.     He  has a past medical history of Hepatic steatosis, Hypogonadism in male, and Vitamin D deficiency.    ROS  No chest pain, no shortness of breath, no abdominal pain       Objective:     /86 (BP Location: Left arm, " "Patient Position: Sitting, BP Cuff Size: Large adult)   Pulse 70   Temp 36.6 °C (97.8 °F) (Temporal)   Resp (!) 11   Ht 1.854 m (6' 1\")   Wt (!) 130 kg (286 lb)   SpO2 97%  Body mass index is 37.73 kg/m².   Physical Exam:  Constitutional: Alert, no distress.  Skin: Warm, dry, good turgor, no rashes in visible areas.  Eye: Equal, round and reactive, conjunctiva clear, lids normal.  ENMT: Lips without lesions, good dentition, oropharynx clear.  Neck: Trachea midline, no masses, no thyromegaly. No cervical or supraclavicular lymphadenopathy  Respiratory: Unlabored respiratory effort, lungs clear to auscultation, no wheezes, no ronchi.  Cardiovascular: Normal S1, S2, no murmur, no edema.  Abdomen: Soft, non-tender, no masses, no hepatosplenomegaly.  Psych: Alert and oriented x3, normal affect and mood.    Assessment and Plan:   The following treatment plan was discussed    1. Mixed hyperlipidemia    2. Need for vaccination  - Hepatitis B Vaccine Adult 20+    3. Screening for malignant neoplasm of prostate  - PROSTATE SPECIFIC AG SCREENING; Future    4. Skin cancer screening  - Referral to Dermatology    5. Vitamin D deficiency  - VITAMIN D,25 HYDROXY (DEFICIENCY); Future    Other orders  - Zoster Vac Recomb Adjuvanted (SHINGRIX) 50 MCG/0.5ML Recon Susp; Inject 0.5 mL into the shoulder, thigh, or buttocks one time for 1 dose.  Dispense: 0.5 mL; Refill: 0      Followup: Return in about 6 months (around 7/23/2025) for Lab Review.           "

## 2025-02-12 ENCOUNTER — APPOINTMENT (OUTPATIENT)
Dept: URBAN - METROPOLITAN AREA CLINIC 31 | Facility: CLINIC | Age: 55
Setting detail: DERMATOLOGY
End: 2025-02-12

## 2025-02-12 DIAGNOSIS — Z71.89 OTHER SPECIFIED COUNSELING: ICD-10-CM

## 2025-02-12 DIAGNOSIS — L57.0 ACTINIC KERATOSIS: ICD-10-CM

## 2025-02-12 DIAGNOSIS — D485 NEOPLASM OF UNCERTAIN BEHAVIOR OF SKIN: ICD-10-CM

## 2025-02-12 DIAGNOSIS — L91.8 OTHER HYPERTROPHIC DISORDERS OF THE SKIN: ICD-10-CM

## 2025-02-12 DIAGNOSIS — L81.4 OTHER MELANIN HYPERPIGMENTATION: ICD-10-CM

## 2025-02-12 DIAGNOSIS — D18.0 HEMANGIOMA: ICD-10-CM

## 2025-02-12 DIAGNOSIS — D22 MELANOCYTIC NEVI: ICD-10-CM

## 2025-02-12 DIAGNOSIS — L82.1 OTHER SEBORRHEIC KERATOSIS: ICD-10-CM

## 2025-02-12 PROBLEM — D22.62 MELANOCYTIC NEVI OF LEFT UPPER LIMB, INCLUDING SHOULDER: Status: ACTIVE | Noted: 2025-02-12

## 2025-02-12 PROBLEM — D22.71 MELANOCYTIC NEVI OF RIGHT LOWER LIMB, INCLUDING HIP: Status: ACTIVE | Noted: 2025-02-12

## 2025-02-12 PROBLEM — D22.61 MELANOCYTIC NEVI OF RIGHT UPPER LIMB, INCLUDING SHOULDER: Status: ACTIVE | Noted: 2025-02-12

## 2025-02-12 PROBLEM — D22.5 MELANOCYTIC NEVI OF TRUNK: Status: ACTIVE | Noted: 2025-02-12

## 2025-02-12 PROBLEM — D22.72 MELANOCYTIC NEVI OF LEFT LOWER LIMB, INCLUDING HIP: Status: ACTIVE | Noted: 2025-02-12

## 2025-02-12 PROBLEM — D48.5 NEOPLASM OF UNCERTAIN BEHAVIOR OF SKIN: Status: ACTIVE | Noted: 2025-02-12

## 2025-02-12 PROBLEM — D18.01 HEMANGIOMA OF SKIN AND SUBCUTANEOUS TISSUE: Status: ACTIVE | Noted: 2025-02-12

## 2025-02-12 PROCEDURE — ? LIQUID NITROGEN

## 2025-02-12 PROCEDURE — 17003 DESTRUCT PREMALG LES 2-14: CPT

## 2025-02-12 PROCEDURE — 11102 TANGNTL BX SKIN SINGLE LES: CPT

## 2025-02-12 PROCEDURE — ? COUNSELING

## 2025-02-12 PROCEDURE — 99203 OFFICE O/P NEW LOW 30 MIN: CPT | Mod: 25

## 2025-02-12 PROCEDURE — ? BIOPSY BY SHAVE METHOD

## 2025-02-12 PROCEDURE — 17000 DESTRUCT PREMALG LESION: CPT | Mod: 59

## 2025-02-12 ASSESSMENT — LOCATION DETAILED DESCRIPTION DERM
LOCATION DETAILED: RIGHT PROXIMAL POSTERIOR UPPER ARM
LOCATION DETAILED: LEFT PROXIMAL CALF
LOCATION DETAILED: LEFT LATERAL MALAR CHEEK
LOCATION DETAILED: RIGHT INFERIOR MEDIAL UPPER BACK
LOCATION DETAILED: RIGHT MEDIAL ZYGOMA
LOCATION DETAILED: LEFT ANTERIOR DISTAL THIGH
LOCATION DETAILED: RIGHT PROXIMAL DORSAL FOREARM
LOCATION DETAILED: LEFT VENTRAL DISTAL FOREARM
LOCATION DETAILED: RIGHT ULNAR DORSAL HAND
LOCATION DETAILED: RIGHT AXILLARY VAULT
LOCATION DETAILED: LEFT PROXIMAL DORSAL FOREARM
LOCATION DETAILED: LEFT ANTERIOR SHOULDER
LOCATION DETAILED: LEFT ANTERIOR DISTAL UPPER ARM
LOCATION DETAILED: LEFT POSTERIOR SHOULDER
LOCATION DETAILED: RIGHT ANTERIOR DISTAL UPPER ARM
LOCATION DETAILED: LEFT RADIAL DORSAL HAND
LOCATION DETAILED: LEFT PROXIMAL POSTERIOR UPPER ARM
LOCATION DETAILED: LEFT NASAL DORSUM
LOCATION DETAILED: RIGHT CENTRAL MALAR CHEEK
LOCATION DETAILED: LEFT CENTRAL EYEBROW
LOCATION DETAILED: RIGHT PROXIMAL CALF
LOCATION DETAILED: RIGHT VENTRAL DISTAL FOREARM
LOCATION DETAILED: RIGHT MID PREAURICULAR CHEEK
LOCATION DETAILED: RIGHT POSTERIOR SHOULDER
LOCATION DETAILED: RIGHT ANTERIOR DISTAL THIGH
LOCATION DETAILED: EPIGASTRIC SKIN
LOCATION DETAILED: INFERIOR THORACIC SPINE
LOCATION DETAILED: PERIUMBILICAL SKIN
LOCATION DETAILED: RIGHT SUPERIOR MEDIAL MIDBACK

## 2025-02-12 ASSESSMENT — LOCATION ZONE DERM
LOCATION ZONE: NOSE
LOCATION ZONE: HAND
LOCATION ZONE: TRUNK
LOCATION ZONE: FACE
LOCATION ZONE: LEG
LOCATION ZONE: ARM
LOCATION ZONE: AXILLAE

## 2025-02-12 ASSESSMENT — LOCATION SIMPLE DESCRIPTION DERM
LOCATION SIMPLE: LEFT CALF
LOCATION SIMPLE: RIGHT THIGH
LOCATION SIMPLE: RIGHT CHEEK
LOCATION SIMPLE: RIGHT UPPER ARM
LOCATION SIMPLE: LEFT EYEBROW
LOCATION SIMPLE: LEFT THIGH
LOCATION SIMPLE: LEFT CHEEK
LOCATION SIMPLE: LEFT FOREARM
LOCATION SIMPLE: RIGHT HAND
LOCATION SIMPLE: RIGHT FOREARM
LOCATION SIMPLE: RIGHT SHOULDER
LOCATION SIMPLE: RIGHT CALF
LOCATION SIMPLE: UPPER BACK
LOCATION SIMPLE: RIGHT ZYGOMA
LOCATION SIMPLE: NOSE
LOCATION SIMPLE: RIGHT AXILLARY VAULT
LOCATION SIMPLE: LEFT HAND
LOCATION SIMPLE: ABDOMEN
LOCATION SIMPLE: LEFT SHOULDER
LOCATION SIMPLE: RIGHT UPPER BACK
LOCATION SIMPLE: RIGHT LOWER BACK
LOCATION SIMPLE: LEFT UPPER ARM

## 2025-02-16 ENCOUNTER — HOSPITAL ENCOUNTER (OUTPATIENT)
Dept: RADIOLOGY | Facility: MEDICAL CENTER | Age: 55
End: 2025-02-16
Attending: INTERNAL MEDICINE
Payer: COMMERCIAL

## 2025-02-16 DIAGNOSIS — D35.2 PITUITARY ADENOMA (HCC): ICD-10-CM

## 2025-02-16 PROCEDURE — 70553 MRI BRAIN STEM W/O & W/DYE: CPT

## 2025-02-16 PROCEDURE — A9579 GAD-BASE MR CONTRAST NOS,1ML: HCPCS | Mod: JZ | Performed by: INTERNAL MEDICINE

## 2025-02-16 PROCEDURE — 700117 HCHG RX CONTRAST REV CODE 255: Mod: JZ | Performed by: INTERNAL MEDICINE

## 2025-02-16 RX ADMIN — GADOTERIDOL 20 ML: 279.3 INJECTION, SOLUTION INTRAVENOUS at 07:53

## 2025-02-20 ENCOUNTER — RESULTS FOLLOW-UP (OUTPATIENT)
Dept: ENDOCRINOLOGY | Facility: MEDICAL CENTER | Age: 55
End: 2025-02-20

## 2025-02-26 DIAGNOSIS — R94.39 ABNORMAL STRESS TEST: ICD-10-CM

## 2025-02-26 DIAGNOSIS — R06.02 SHORTNESS OF BREATH: ICD-10-CM

## 2025-02-26 DIAGNOSIS — Z82.49 FAMILY HISTORY OF HEART DISEASE: ICD-10-CM

## 2025-02-26 DIAGNOSIS — E66.9 OBESITY (BMI 30-39.9): ICD-10-CM

## 2025-02-26 DIAGNOSIS — Z82.49 FAMILY HISTORY OF HEART ATTACK: ICD-10-CM

## 2025-02-26 DIAGNOSIS — E78.2 MIXED HYPERLIPIDEMIA: ICD-10-CM

## 2025-02-27 RX ORDER — METOPROLOL SUCCINATE 25 MG/1
12.5 TABLET, EXTENDED RELEASE ORAL DAILY
Qty: 45 TABLET | Refills: 2 | Status: SHIPPED | OUTPATIENT
Start: 2025-02-27

## 2025-02-27 NOTE — TELEPHONE ENCOUNTER
Is the patient due for a refill? Yes    Was the patient seen the last 15 months? Yes    Date of last office visit: 12/16/24    Does the patient have an upcoming appointment?  Yes   If yes, When? 3/24/2025    Provider to refill:MELONY    Does the patient have [facility] and need 100-day supply? (This applies to ALL medications) Patient does not have SCP

## 2025-03-04 ENCOUNTER — OFFICE VISIT (OUTPATIENT)
Dept: CARDIOLOGY | Facility: MEDICAL CENTER | Age: 55
End: 2025-03-04
Attending: INTERNAL MEDICINE
Payer: COMMERCIAL

## 2025-03-04 ENCOUNTER — RESEARCH ENCOUNTER (OUTPATIENT)
Dept: CARDIOLOGY | Facility: MEDICAL CENTER | Age: 55
End: 2025-03-04
Payer: COMMERCIAL

## 2025-03-04 VITALS
HEART RATE: 70 BPM | WEIGHT: 284.39 LBS | BODY MASS INDEX: 37.52 KG/M2 | DIASTOLIC BLOOD PRESSURE: 93 MMHG | SYSTOLIC BLOOD PRESSURE: 130 MMHG

## 2025-03-04 DIAGNOSIS — E78.2 MIXED HYPERLIPIDEMIA: ICD-10-CM

## 2025-03-04 PROCEDURE — 99212 OFFICE O/P EST SF 10 MIN: CPT | Performed by: INTERNAL MEDICINE

## 2025-03-04 NOTE — RESEARCH NOTE
Name: Miky Benavidez   MRN: [id number]  Participant ID:  [id number]  : 1970  Visit Date/Time: 3/4/2025 9:00 AM      Study:    [id number] - [facility]   Status Consented/Enrolled (3/6/2023)   Active Start Date 3/6/23   Participant ID [id number]   Coordinator Michelle Austin; Shaista Baca;    IRB [id number]   NCT [id number]    Wilder Hollis M.D.     Study Note:      Miky here today for 24M End of Study visit. His last study injection was 2024. He reports no issues from this, and no AE/СВЕТЛАНА since last visit.    Re-Consent note:     Updated study consent form V 06 was discussed with patient today. All aspects of the study purpose and procedures were explained along with noted changes. Subject was given ample time to review the consent and all questions were answered to their satisfaction. Patient aware that the clinical trial is voluntary and they may withdraw consent at any time without affecting the level of care they receive.  Subject signed the new consent without coercion and undue influence and was given a copy of the signed consent. No study-related procedures took place prior to consenting and all assessments were conducted per protocol.     No changes to ConMeds.    Full physical exam performed by Dr. Hollis today, see his note.    Patient left feeling well and understands to call with any questions or AE's.     Vitals: BP OMRON reading after sitting for 5 minutes, 1-2 min apart  Sitting quietly starting 9:15 am    Vitals:    25 0920 25 0921 25 0923   BP: (!) 133/95 (!) 136/94 (!) 130/93   BP Location: Left arm     Patient Position: Sitting     Pulse: 69 68 70   Weight: (!) 129 kg (284 lb 6.3 oz)         Labs:      Patient reports fasting for >10hours. Study labs drawn and processed today per protocol.   Refrigerated labs shipped today Tracking #[id number]  Frozen labs shipped today tracking #[id number]                  Meds:    Current  "Outpatient Medications   Medication Sig Dispense Refill    metoprolol SR (TOPROL XL) 25 MG TABLET SR 24 HR Take 1/2 (one-half) tablet by mouth once daily 45 Tablet 2    rosuvastatin (CRESTOR) 20 MG Tab TAKE 1 TABLET BY MOUTH ONCE DAILY IN THE EVENING 90 Tablet 3    tadalafil (CIALIS) 5 MG tablet Take 1 Tablet by mouth every day. 90 Tablet 3    Cholecalciferol (VITAMIN D) 2000 UNIT Tab Take 2 Tablets by mouth every day.      inclisiran sodium or PLACEBO (STUDY DRUG) 300 mg/1.5 mL injection Inject 300 mg under the skin every 6 months. Given in clinic (Patient not taking: Reported on 1/23/2025)      aspirin 81 MG EC tablet Take 1 Tablet by mouth every day. 100 Tablet 3    SYRINGE-NEEDLE, DISP, 3 ML (BD LUER-LOCK SYRINGE) 18G X 1-1/2\" 3 ML Misc 1 Each every 14 days. Use to draw up testosterone 30 Each 11    SYRINGE-NEEDLE, DISP, 3 ML (B-D 3CC LUER-LAKESHA SYR 22GX1\") 22G X 1\" 3 ML Misc 1 Each every 14 days. Use for testosterone injection 30 Each 11     No current facility-administered medications for this visit.    current meds      Follow-up: EOS CCTA 3/10/2025      "

## 2025-03-06 ENCOUNTER — TELEPHONE (OUTPATIENT)
Dept: CARDIOLOGY | Facility: MEDICAL CENTER | Age: 55
End: 2025-03-06
Payer: COMMERCIAL

## 2025-03-06 NOTE — PROGRESS NOTES
Patient is here today for [facility] study visit last visit 03/05/2025.    All study assessment results reviewed.    Complete Physical Exam  GENERAL APPEARANCE: Appears healthy.  Alert; in no acute distress.  Pleasant.,   HEAD: negative,   EYES: Anicteric, conjunctivae/corneas clear. PERRLA. EOMI. Fundi benign.,   EARS: External ears normal. Canals clear. TM's normal.,   NOSE: negative,   THROAT: negative ,   NECK: negative,   BACK: negative,   LUNGS: negative,   CARDIOVASCULAR: negative,   ABDOMEN: Abdomen soft, non-tender. BS normal. No masses,  No organomegaly,   EXTREMITIES: negative,  PULSES: negative,   SKIN: negative,   LYMPH NODES: No palpable lymph nodes,   NEUROLOGIC: negative        No abnormalities.  Will get final CCTA.    Wilder Hollis M.D.

## 2025-03-07 ENCOUNTER — TELEPHONE (OUTPATIENT)
Dept: CARDIOLOGY | Facility: MEDICAL CENTER | Age: 55
End: 2025-03-07
Payer: COMMERCIAL

## 2025-03-07 DIAGNOSIS — Z00.6 RESEARCH EXAM: ICD-10-CM

## 2025-03-07 RX ORDER — NITROGLYCERIN 0.4 MG/1
0.4 TABLET SUBLINGUAL ONCE
Qty: 1 TABLET | Refills: 0
Start: 2025-03-07 | End: 2025-03-07

## 2025-03-10 ENCOUNTER — HOSPITAL ENCOUNTER (OUTPATIENT)
Dept: RADIOLOGY | Facility: MEDICAL CENTER | Age: 55
End: 2025-03-10
Attending: INTERNAL MEDICINE
Payer: COMMERCIAL

## 2025-03-10 VITALS — DIASTOLIC BLOOD PRESSURE: 74 MMHG | HEART RATE: 62 BPM | SYSTOLIC BLOOD PRESSURE: 142 MMHG

## 2025-03-10 DIAGNOSIS — R93.1 ELEVATED CORONARY ARTERY CALCIUM SCORE: ICD-10-CM

## 2025-03-10 DIAGNOSIS — E78.2 MIXED HYPERLIPIDEMIA: ICD-10-CM

## 2025-03-10 PROCEDURE — 75574 CT ANGIO HRT W/3D IMAGE: CPT

## 2025-03-10 PROCEDURE — A9270 NON-COVERED ITEM OR SERVICE: HCPCS

## 2025-03-10 PROCEDURE — 700102 HCHG RX REV CODE 250 W/ 637 OVERRIDE(OP)

## 2025-03-10 PROCEDURE — 700117 HCHG RX CONTRAST REV CODE 255: Performed by: INTERNAL MEDICINE

## 2025-03-10 RX ORDER — NITROGLYCERIN 0.4 MG/1
0.4 TABLET SUBLINGUAL
Status: DISCONTINUED | OUTPATIENT
Start: 2025-03-10 | End: 2025-03-11 | Stop reason: HOSPADM

## 2025-03-10 RX ORDER — NITROGLYCERIN 0.4 MG/1
TABLET SUBLINGUAL
Status: COMPLETED
Start: 2025-03-10 | End: 2025-03-10

## 2025-03-10 RX ADMIN — NITROGLYCERIN 0.4 MG: 0.4 TABLET SUBLINGUAL at 08:39

## 2025-03-10 RX ADMIN — IOHEXOL 75 ML: 350 INJECTION, SOLUTION INTRAVENOUS at 09:30

## 2025-03-10 NOTE — PROGRESS NOTES
Patient had CCTA.  Patient brought to preparation room.   Verbal consent given by patient for PIV and administration of Nitroglycerin by RN.  PIV initiated, 20 LAC    Review of medications, protocol, and NPO status completed.   Education provided to patient regarding examination.  Patient given baseline 3 lead EKG:   Vitals: 0732  BP: 139/88  HR: 62  RR: 16   95% RA     Patient ready for CT scan  Vitals: 0832  BP: 142/104  HR: 69  RR: 16  94% RA    Administration of Sublingual Nitroglycerin given per CCTA protocol at 0839--See MAR  Vitals: 0839  BP: 142/74  HR: 62  RR: 16  96% RA    Post Nitro Vitals: 0843  BP: 138/71  HR: 80  RR: 16  94% RA    END Vitals: 0846  BP: 131/57  HR: 54  RR: 16  94% RA    Patient returned to preparation area where post procedure education given. PIV removed, patient provided with water.     Vitals returned to baseline. Patient states they are ready to go home. Discharge education provided. Discharged per protocol.     Patient is ambulatory, escorted by RN to [facility].

## 2025-03-12 ENCOUNTER — RESULTS FOLLOW-UP (OUTPATIENT)
Dept: CARDIOLOGY | Facility: MEDICAL CENTER | Age: 55
End: 2025-03-12
Payer: COMMERCIAL

## 2025-04-09 ENCOUNTER — RESEARCH ENCOUNTER (OUTPATIENT)
Dept: CARDIOLOGY | Facility: MEDICAL CENTER | Age: 55
End: 2025-04-09
Payer: COMMERCIAL

## 2025-04-09 NOTE — RESEARCH NOTE
[facility] research study: Spoke with Miky for 30-day EOS follow up. He is doing well with no AE/СВЕТЛАНА or medication changes to report. I thanked him for his participation and that we would let him know his treatment assignment when it becomes available.

## 2025-04-18 DIAGNOSIS — R94.39 ABNORMAL STRESS TEST: ICD-10-CM

## 2025-04-18 DIAGNOSIS — E78.2 MIXED HYPERLIPIDEMIA: ICD-10-CM

## 2025-04-18 DIAGNOSIS — R93.1 ELEVATED CORONARY ARTERY CALCIUM SCORE: ICD-10-CM

## 2025-04-18 DIAGNOSIS — R06.02 SHORTNESS OF BREATH: ICD-10-CM

## 2025-04-18 DIAGNOSIS — Z82.49 FAMILY HISTORY OF HEART DISEASE: ICD-10-CM

## 2025-04-18 DIAGNOSIS — E66.9 OBESITY (BMI 30-39.9): ICD-10-CM

## 2025-04-18 DIAGNOSIS — Z00.6 RESEARCH STUDY PATIENT: ICD-10-CM

## 2025-04-18 DIAGNOSIS — Z82.49 FAMILY HISTORY OF HEART ATTACK: ICD-10-CM

## 2025-04-21 RX ORDER — HYDROGEN PEROXIDE 2.65 ML/100ML
81 LIQUID ORAL; TOPICAL DAILY
Qty: 90 TABLET | Refills: 2 | Status: SHIPPED | OUTPATIENT
Start: 2025-04-21

## 2025-04-21 NOTE — TELEPHONE ENCOUNTER
Is the patient due for a refill? Yes    Was the patient seen the last 15 months? Yes    Date of last office visit: 3.4.2025    Does the patient have an upcoming appointment?  No       Provider to refill:TT    Does the patient have [facility] and need 100-day supply? (This applies to ALL medications) Patient does not have SCP

## 2025-07-03 PROBLEM — M77.11 RIGHT TENNIS ELBOW: Status: ACTIVE | Noted: 2025-07-03

## 2025-07-09 ENCOUNTER — HOSPITAL ENCOUNTER (OUTPATIENT)
Dept: LAB | Facility: MEDICAL CENTER | Age: 55
End: 2025-07-09
Attending: INTERNAL MEDICINE
Payer: COMMERCIAL

## 2025-07-09 DIAGNOSIS — E55.9 VITAMIN D DEFICIENCY: ICD-10-CM

## 2025-07-09 DIAGNOSIS — R73.03 PREDIABETES: ICD-10-CM

## 2025-07-09 DIAGNOSIS — E66.9 OBESITY (BMI 35.0-39.9 WITHOUT COMORBIDITY): ICD-10-CM

## 2025-07-09 DIAGNOSIS — E23.0 HYPOGONADOTROPIC HYPOGONADISM (HCC): ICD-10-CM

## 2025-07-09 DIAGNOSIS — Z12.5 SCREENING FOR PROSTATE CANCER: ICD-10-CM

## 2025-07-09 DIAGNOSIS — D35.2 PITUITARY ADENOMA (HCC): ICD-10-CM

## 2025-07-09 LAB
25(OH)D3 SERPL-MCNC: 65 NG/ML (ref 30–100)
ALBUMIN SERPL BCP-MCNC: 4.2 G/DL (ref 3.2–4.9)
ALBUMIN/GLOB SERPL: 1.6 G/DL
ALP SERPL-CCNC: 96 U/L (ref 30–99)
ALT SERPL-CCNC: 53 U/L (ref 2–50)
ANION GAP SERPL CALC-SCNC: 10 MMOL/L (ref 7–16)
AST SERPL-CCNC: 40 U/L (ref 12–45)
BILIRUB SERPL-MCNC: 0.7 MG/DL (ref 0.1–1.5)
BUN SERPL-MCNC: 13 MG/DL (ref 8–22)
CALCIUM ALBUM COR SERPL-MCNC: 8.6 MG/DL (ref 8.5–10.5)
CALCIUM SERPL-MCNC: 8.8 MG/DL (ref 8.5–10.5)
CHLORIDE SERPL-SCNC: 102 MMOL/L (ref 96–112)
CO2 SERPL-SCNC: 25 MMOL/L (ref 20–33)
CREAT SERPL-MCNC: 1.32 MG/DL (ref 0.5–1.4)
GFR SERPLBLD CREATININE-BSD FMLA CKD-EPI: 65 ML/MIN/1.73 M 2
GLOBULIN SER CALC-MCNC: 2.6 G/DL (ref 1.9–3.5)
GLUCOSE SERPL-MCNC: 84 MG/DL (ref 65–99)
HCT VFR BLD AUTO: 55.8 % (ref 42–52)
HGB BLD-MCNC: 18 G/DL (ref 14–18)
POTASSIUM SERPL-SCNC: 4.7 MMOL/L (ref 3.6–5.5)
PROT SERPL-MCNC: 6.8 G/DL (ref 6–8.2)
PSA SERPL DL<=0.01 NG/ML-MCNC: 1.67 NG/ML (ref 0–4)
SODIUM SERPL-SCNC: 137 MMOL/L (ref 135–145)

## 2025-07-09 PROCEDURE — 84270 ASSAY OF SEX HORMONE GLOBUL: CPT

## 2025-07-09 PROCEDURE — 84402 ASSAY OF FREE TESTOSTERONE: CPT

## 2025-07-09 PROCEDURE — 80053 COMPREHEN METABOLIC PANEL: CPT

## 2025-07-09 PROCEDURE — 85018 HEMOGLOBIN: CPT

## 2025-07-09 PROCEDURE — 84153 ASSAY OF PSA TOTAL: CPT

## 2025-07-09 PROCEDURE — 82306 VITAMIN D 25 HYDROXY: CPT

## 2025-07-09 PROCEDURE — 36415 COLL VENOUS BLD VENIPUNCTURE: CPT

## 2025-07-09 PROCEDURE — 85014 HEMATOCRIT: CPT

## 2025-07-09 PROCEDURE — 84403 ASSAY OF TOTAL TESTOSTERONE: CPT

## 2025-07-12 LAB
SHBG SERPL-SCNC: 28 NMOL/L (ref 19–76)
TESTOST FREE MFR SERPL: 2.3 % (ref 1.6–2.9)
TESTOST FREE SERPL-MCNC: 241 PG/ML (ref 47–244)
TESTOST SERPL-MCNC: 1040 NG/DL (ref 300–890)

## 2025-07-25 ENCOUNTER — OFFICE VISIT (OUTPATIENT)
Dept: MEDICAL GROUP | Facility: PHYSICIAN GROUP | Age: 55
End: 2025-07-25
Payer: COMMERCIAL

## 2025-07-25 VITALS
HEART RATE: 80 BPM | OXYGEN SATURATION: 97 % | HEIGHT: 73 IN | TEMPERATURE: 98 F | SYSTOLIC BLOOD PRESSURE: 122 MMHG | BODY MASS INDEX: 32.47 KG/M2 | RESPIRATION RATE: 16 BRPM | WEIGHT: 245 LBS | DIASTOLIC BLOOD PRESSURE: 78 MMHG

## 2025-07-25 DIAGNOSIS — R71.8 ELEVATED HEMATOCRIT: ICD-10-CM

## 2025-07-25 DIAGNOSIS — R73.03 PREDIABETES: ICD-10-CM

## 2025-07-25 DIAGNOSIS — E78.2 MIXED HYPERLIPIDEMIA: Primary | ICD-10-CM

## 2025-07-25 PROCEDURE — 99214 OFFICE O/P EST MOD 30 MIN: CPT | Performed by: FAMILY MEDICINE

## 2025-07-25 PROCEDURE — 3074F SYST BP LT 130 MM HG: CPT | Performed by: FAMILY MEDICINE

## 2025-07-25 PROCEDURE — 3078F DIAST BP <80 MM HG: CPT | Performed by: FAMILY MEDICINE

## 2025-07-25 RX ORDER — CHLORHEXIDINE GLUCONATE ORAL RINSE 1.2 MG/ML
SOLUTION DENTAL
COMMUNITY
Start: 2025-06-10 | End: 2025-07-25

## 2025-07-25 RX ORDER — TESTOSTERONE CYPIONATE 200 MG/ML
INJECTION, SOLUTION INTRAMUSCULAR
COMMUNITY
Start: 2025-06-09

## 2025-07-29 NOTE — PROGRESS NOTES
"Subjective:   Miky Benavidez is a 54 y.o. male here today for evaluation and management of:     History of Present Illness  The patient presents for a health maintenance visit.    He reports feeling well overall. He has made significant lifestyle changes, including eliminating soda from his diet and reducing his intake of processed foods. He has also been monitoring his blood pressure at home. He is considering resuming a 25-minute daily workout routine that he used to follow after his back surgery in 2013. He has lost 40 pounds in the last 4 months. He is currently on rosuvastatin and is interested in having a lipid panel done. He is also taking testosterone supplements under the supervision of Dr. Cronin.          Current medicines (including changes today)  Current Medications[1]  He  has a past medical history of Hepatic steatosis, Hypogonadism in male, and Vitamin D deficiency.    ROS  No chest pain, no shortness of breath, no abdominal pain       Objective:     /78 (BP Location: Left arm, Patient Position: Sitting, BP Cuff Size: Adult)   Pulse 80   Temp 36.7 °C (98 °F) (Temporal)   Resp 16   Ht 1.854 m (6' 1\")   Wt 111 kg (245 lb)   SpO2 97%  Body mass index is 32.32 kg/m².   Physical Exam:  Constitutional: Alert, no distress.  Skin: Warm, dry, good turgor, no rashes in visible areas.  Eye: Equal, round and reactive, conjunctiva clear, lids normal.  ENMT: Lips without lesions, good dentition, oropharynx clear.  Neck: Trachea midline, no masses, no thyromegaly. No cervical or supraclavicular lymphadenopathy  Respiratory: Unlabored respiratory effort, lungs clear to auscultation, no wheezes, no ronchi.  Cardiovascular: Normal S1, S2, no murmur, no edema.  Abdomen: Soft, non-tender, no masses, no hepatosplenomegaly.  Psych: Alert and oriented x3, normal affect and mood.       The following treatment plan was discussed  Assessment & Plan  1. Elevated hematocrit.  - Hematocrit levels are slightly " elevated.  - Testosterone supplementation may be contributing to the elevated levels.  - Monitoring will be continued by Dr. Cronin.  - No immediate changes in management required.    2. Borderline elevated liver enzymes.  - Liver enzymes are borderline high.  - Potential causes include fatty liver or rosuvastatin use.  - Lipid panel will be ordered to monitor lipid levels.  - Advised to continue current lifestyle modifications, including maintaining a healthy diet and regular exercise.    3. Health Maintenance.  - Patient is in great health medically.  - Declined pneumonia vaccine today.  - Encouraged to share health journey with friends and family to inspire others.  - Advised to continue current lifestyle modifications, including maintaining a healthy diet and regular exercise.    Follow-up: Next scheduled visit in 6 months.    1. Mixed hyperlipidemia  - Lipid Profile; Future  - Comp Metabolic Panel; Future    2. Elevated hematocrit  - CBC WITH DIFFERENTIAL; Future    3. Prediabetes  - HEMOGLOBIN A1C; Future    Other orders  - testosterone cypionate (DEPO-TESTOSTERONE) 200 MG/ML injection; INJECT 0.4ML INTRAMUSCULARLY INTO THE SHOULDER, THIGH, OR BUTTOCKS EVERY 7 DAYS.      Followup: Return in about 6 months (around 1/25/2026) for Lab Review.  Verbal consent was acquired by the patient to use [employer] ambient listening note generation during this visit Yes                 [1]   Current Outpatient Medications   Medication Sig Dispense Refill    testosterone cypionate (DEPO-TESTOSTERONE) 200 MG/ML injection INJECT 0.4ML INTRAMUSCULARLY INTO THE SHOULDER, THIGH, OR BUTTOCKS EVERY 7 DAYS.      aspirin (EQ ASPIRIN ADULT LOW DOSE) 81 MG EC tablet Take 1 tablet by mouth once daily 90 Tablet 2    metoprolol SR (TOPROL XL) 25 MG TABLET SR 24 HR Take 1/2 (one-half) tablet by mouth once daily 45 Tablet 2    rosuvastatin (CRESTOR) 20 MG Tab TAKE 1 TABLET BY MOUTH ONCE DAILY IN THE EVENING 90 Tablet 3    tadalafil  "(CIALIS) 5 MG tablet Take 1 Tablet by mouth every day. 90 Tablet 3    Cholecalciferol (VITAMIN D) 2000 UNIT Tab Take 2 Tablets by mouth every day.      SYRINGE-NEEDLE, DISP, 3 ML (BD LUER-LOCK SYRINGE) 18G X 1-1/2\" 3 ML Misc 1 Each every 14 days. Use to draw up testosterone 30 Each 11    SYRINGE-NEEDLE, DISP, 3 ML (B-D 3CC LUER-LAKESHA SYR 22GX1\") 22G X 1\" 3 ML Misc 1 Each every 14 days. Use for testosterone injection 30 Each 11     No current facility-administered medications for this visit.     "

## 2025-08-18 ENCOUNTER — OFFICE VISIT (OUTPATIENT)
Dept: ENDOCRINOLOGY | Facility: MEDICAL CENTER | Age: 55
End: 2025-08-18
Attending: INTERNAL MEDICINE
Payer: COMMERCIAL

## 2025-08-18 VITALS
SYSTOLIC BLOOD PRESSURE: 123 MMHG | BODY MASS INDEX: 32.47 KG/M2 | DIASTOLIC BLOOD PRESSURE: 82 MMHG | WEIGHT: 245 LBS | HEIGHT: 73 IN

## 2025-08-18 DIAGNOSIS — E23.0 HYPOGONADOTROPIC HYPOGONADISM (HCC): Primary | ICD-10-CM

## 2025-08-18 DIAGNOSIS — E55.9 VITAMIN D DEFICIENCY: ICD-10-CM

## 2025-08-18 DIAGNOSIS — E66.9 OBESITY (BMI 35.0-39.9 WITHOUT COMORBIDITY): ICD-10-CM

## 2025-08-18 DIAGNOSIS — R73.03 PREDIABETES: ICD-10-CM

## 2025-08-18 LAB
HBA1C MFR BLD: 5.3 % (ref ?–5.8)
POCT INT CON NEG: NEGATIVE
POCT INT CON POS: POSITIVE

## 2025-08-18 PROCEDURE — 3074F SYST BP LT 130 MM HG: CPT | Performed by: INTERNAL MEDICINE

## 2025-08-18 PROCEDURE — 99214 OFFICE O/P EST MOD 30 MIN: CPT | Performed by: INTERNAL MEDICINE

## 2025-08-18 PROCEDURE — 99213 OFFICE O/P EST LOW 20 MIN: CPT | Performed by: INTERNAL MEDICINE

## 2025-08-18 PROCEDURE — 3079F DIAST BP 80-89 MM HG: CPT | Performed by: INTERNAL MEDICINE

## 2025-08-18 PROCEDURE — 83036 HEMOGLOBIN GLYCOSYLATED A1C: CPT | Performed by: INTERNAL MEDICINE

## 2025-08-18 RX ORDER — TESTOSTERONE CYPIONATE 200 MG/ML
80 INJECTION, SOLUTION INTRAMUSCULAR
Qty: 4 ML | Refills: 5 | Status: SHIPPED | OUTPATIENT
Start: 2025-08-18 | End: 2025-09-15